# Patient Record
Sex: FEMALE | Race: WHITE | Employment: OTHER | URBAN - METROPOLITAN AREA
[De-identification: names, ages, dates, MRNs, and addresses within clinical notes are randomized per-mention and may not be internally consistent; named-entity substitution may affect disease eponyms.]

---

## 2017-07-03 ENCOUNTER — TELEPHONE (OUTPATIENT)
Dept: FAMILY MEDICINE CLINIC | Age: 37
End: 2017-07-03

## 2020-01-13 ENCOUNTER — OFFICE VISIT (OUTPATIENT)
Dept: FAMILY MEDICINE CLINIC | Age: 40
End: 2020-01-13
Payer: COMMERCIAL

## 2020-01-13 VITALS
DIASTOLIC BLOOD PRESSURE: 77 MMHG | OXYGEN SATURATION: 98 % | HEIGHT: 62 IN | BODY MASS INDEX: 29.22 KG/M2 | RESPIRATION RATE: 16 BRPM | SYSTOLIC BLOOD PRESSURE: 118 MMHG | WEIGHT: 158.8 LBS | HEART RATE: 80 BPM

## 2020-01-13 LAB — HBA1C MFR BLD: 5.8 %

## 2020-01-13 PROCEDURE — 4004F PT TOBACCO SCREEN RCVD TLK: CPT | Performed by: FAMILY MEDICINE

## 2020-01-13 PROCEDURE — G8427 DOCREV CUR MEDS BY ELIG CLIN: HCPCS | Performed by: FAMILY MEDICINE

## 2020-01-13 PROCEDURE — G8419 CALC BMI OUT NRM PARAM NOF/U: HCPCS | Performed by: FAMILY MEDICINE

## 2020-01-13 PROCEDURE — 83036 HEMOGLOBIN GLYCOSYLATED A1C: CPT | Performed by: FAMILY MEDICINE

## 2020-01-13 PROCEDURE — 99204 OFFICE O/P NEW MOD 45 MIN: CPT | Performed by: FAMILY MEDICINE

## 2020-01-13 PROCEDURE — G8484 FLU IMMUNIZE NO ADMIN: HCPCS | Performed by: FAMILY MEDICINE

## 2020-01-13 RX ORDER — PANTOPRAZOLE SODIUM 40 MG/1
40 TABLET, DELAYED RELEASE ORAL
Qty: 30 TABLET | Refills: 5 | Status: SHIPPED | OUTPATIENT
Start: 2020-01-13 | End: 2020-05-11

## 2020-01-13 ASSESSMENT — PATIENT HEALTH QUESTIONNAIRE - PHQ9
SUM OF ALL RESPONSES TO PHQ QUESTIONS 1-9: 0
SUM OF ALL RESPONSES TO PHQ QUESTIONS 1-9: 0
SUM OF ALL RESPONSES TO PHQ9 QUESTIONS 1 & 2: 0
1. LITTLE INTEREST OR PLEASURE IN DOING THINGS: 0
2. FEELING DOWN, DEPRESSED OR HOPELESS: 0

## 2020-01-13 NOTE — PROGRESS NOTES
 COLONOSCOPY  7-14-15    incomplete/poor prep, hemorrhoids.     ENTEROCELE REPAIR  13    ESOPHAGOSCOPY  1/22/15    HYSTERECTOMY  13    HETAL, Right Salpingectomy, Enterocele, SHON    LAPAROSCOPY  2013    Diagnostic converted to Operative- [facility]       due to cyst- right    TUBAL LIGATION      UPPER GASTROINTESTINAL ENDOSCOPY      WISDOM TOOTH EXTRACTION      x4     Family History   Problem Relation Age of Onset    Kidney Disease Mother     Diabetes Mother     Heart Disease Mother     Lung Cancer Maternal Grandmother     Diabetes Maternal Grandmother     Lung Cancer Maternal Grandfather     Diabetes Maternal Grandfather     Breast Cancer Neg Hx     Cancer Neg Hx     Colon Cancer Neg Hx     Eclampsia Neg Hx     Hypertension Neg Hx     Ovarian Cancer Neg Hx      Labor Neg Hx     Spont Abortions Neg Hx     Stroke Neg Hx        Current Outpatient Medications   Medication Sig Dispense Refill    pantoprazole (PROTONIX) 40 MG tablet Take 1 tablet by mouth daily (with breakfast) 30 tablet 5    dexlansoprazole (DEXILANT) 60 MG CPDR delayed release capsule Take 1 capsule by mouth daily 30 capsule 1    ibuprofen (ADVIL;MOTRIN) 800 MG tablet Take 1 tablet by mouth every 6 hours as needed for Pain (Patient not taking: Reported on 2020) 30 tablet 1    acetaminophen (AMINOFEN) 325 MG tablet Take 2 tablets by mouth every 4 hours as needed for Pain (Patient not taking: Reported on 2020) 30 tablet 1    Melatonin 5 MG SUBL       FETZIMA 40 MG CP24 ER capsule       FANAPT 2 MG TABS tablet       VENTOLIN  (90 BASE) MCG/ACT inhaler INHALE 2 PUFFS EVERY 4 HOURS AS NEEDED FOR WHEEZING (Patient not taking: Reported on 2020) 18 g 0    divalproex (DEPAKOTE ER) 250 MG ER tablet       gabapentin (NEURONTIN) 600 MG tablet       metFORMIN (GLUCOPHAGE) 1000 MG tablet TAKE 1 TABLET TWICE A DAY (Patient not taking: Reported on 2020) 30 tablet 0    clonazePAM (KLONOPIN) 1 MG tablet Take 1 mg by mouth 2 times daily as needed      Respiratory Therapy Supplies (NEBULIZER/TUBING/MOUTHPIECE) KIT 1 kit by Does not apply route daily as needed (Patient not taking: Reported on 1/13/2020) 1 kit 0    cetirizine (ZYRTEC) 10 MG tablet       DEXILANT 60 MG CPDR capsule       vitamin D (ERGOCALCIFEROL) 69318 UNITS CAPS capsule TAKE 1 CAPSULE DAILY ONCE A WEEK (Patient not taking: Reported on 1/13/2020) 12 capsule 3    dicyclomine (BENTYL) 10 MG capsule TAKE 1 CAPSULE FOUR TIMES A DAY AS NEEDED (Patient not taking: Reported on 1/13/2020) 60 capsule 5    simvastatin (ZOCOR) 20 MG tablet Take 1 tablet by mouth nightly (Patient not taking: Reported on 1/13/2020) 30 tablet 11    fenofibrate 160 MG tablet Take 1 tablet by mouth daily (Patient not taking: Reported on 1/13/2020) 30 tablet 11    budesonide-formoterol (SYMBICORT) 80-4.5 MCG/ACT AERO Inhale 2 puffs into the lungs 2 times daily Rinse mouth after use. (Patient not taking: Reported on 1/13/2020) 1 Inhaler 5    hydrocortisone (ANUSOL-HC) 25 MG suppository Place 1 suppository rectally 2 times daily as needed for Hemorrhoids (Patient not taking: Reported on 1/13/2020) 60 suppository 3    levothyroxine (SYNTHROID) 50 MCG tablet TAKE 1 TABLET DAILY (Patient not taking: Reported on 1/13/2020) 30 tablet 0    Blood Glucose Monitoring Suppl (FREESTYLE LITE) LC       albuterol (PROVENTIL) (2.5 MG/3ML) 0.083% nebulizer solution Take 3 mLs by nebulization every 6 hours as needed for Wheezing. (Patient not taking: Reported on 1/13/2020) 120 each 3    naproxen (NAPROSYN) 500 MG tablet Take 1 tablet by mouth 2 times daily.  (Patient not taking: Reported on 1/13/2020) 60 tablet 0    BD SHARPS [address] USE AS DIRECTED (Patient not taking: Reported on 1/13/2020) 1 each 0    FREESTYLE TEST STRIPS strip TEST TWO TIMES A DAY AS DIRECTED (Patient not taking: Reported on 1/13/2020) 100 strip 3    BD PEN NEEDLE ROSANNE U/F 32G X 4 MM MISC       busPIRone (BUSPAR) 15 MG tablet Take 15 mg by mouth 3 times daily. No current facility-administered medications for this visit. ALLERGIES:    Allergies   Allergen Reactions    Codeine     Chantix [Varenicline Tartrate] Rash    Magnesium Citrate Nausea And Vomiting    Vicodin [Hydrocodone-Acetaminophen] Nausea And Vomiting       Social History     Tobacco Use    Smoking status: Current Every Day Smoker     Packs/day: 2.00     Years: 15.00     Pack years: 30.00     Types: Cigarettes    Smokeless tobacco: Never Used   Substance Use Topics    Alcohol use: No        LDL Cholesterol (mg/dL)   Date Value   05/19/2016 52     HDL (mg/dL)   Date Value   05/19/2016 54     BUN (mg/dL)   Date Value   05/19/2016 10     CREATININE (mg/dL)   Date Value   05/19/2016 1.12 (H)     Glucose (mg/dL)   Date Value   05/19/2016 116 (H)   04/04/2012 147 (H)     Hemoglobin A1C (%)   Date Value   01/13/2020 5.8     Microalb/Crt. Ratio (mcg/mg creat)   Date Value   05/15/2015 7              Subjective:      HPI  She is here today to reestablish care  She has not been seen by me in about 5 years she had moved out of town  She previously was diabetic is lost a considerable amount of weight her hemoglobin A1c today off meds is 5.8  She continues to have ongoing gastrointestinal abnormalities she was seen by Dr. Gerhardt Bias in the past and would like to return to see him she continues to have gastroesophageal reflux issues. She previously also was on Dexilant wanted to go back on that  She has been taking over-the-counter Zantac      Review of Systems:     Constitutional: Negative for fever, appetite change and fatigue. Family social and medical history reviewed and unchanged     HENT: Negative. Negative for nosebleeds, trouble swallowing and neck pain. Eyes: Negative for photophobia and visual disturbance. Respiratory: Negative. Negative for chest tightness and shortness of breath. Cardiovascular: Negative. Negative for chest pain and leg swelling. Gastrointestinal: Negative. Negative for abdominal pain and blood in stool. Endocrine: Negative for cold intolerance and polyuria. Genitourinary: Negative for dysuria and hematuria. Musculoskeletal: Negative. Skin: Negative for rash. Allergic/Immunologic: Negative. Neurological: Negative. Negative for dizziness, weakness and numbness. Hematological: Negative. Negative for adenopathy. Does not bruise/bleed easily. Psychiatric/Behavioral: Negative for sleep disturbance, dysphoric mood and  decreased concentration. The patient is not nervous/anxious. Objective:     Physical Exam:     Nursing note and vitals reviewed. /77   Pulse 80   Resp 16   Ht 5' 2.05\" (1.576 m)   Wt 158 lb 12.8 oz (72 kg)   LMP 05/30/2013   SpO2 98%   BMI 29.00 kg/m²   Constitutional: She is oriented to person, place, and time. She   appears well-developed and well-nourished. HENT:   Head: Normocephalic and atraumatic. Right Ear: External ear normal. Tympanic membrane is not erythematous. No middle ear effusion. Left Ear: External ear normal. Tympanic membrane is not erythematous. No middle ear effusion. Nose: No mucosal edema. Mouth/Throat: Oropharynx is clear and moist. No posterior oropharyngeal erythema. Eyes: Conjunctivae and EOM are normal. Pupils are equal, round, and reactive to light. Neck: Normal range of motion. Neck supple. No thyromegaly present. Cardiovascular: Normal rate, regular rhythm and normal heart sounds. No murmur heard. Pulmonary/Chest: Effort normal and breath sounds normal. She has no wheezes. Shehas no rales. Abdominal: Soft. Bowel sounds are normal. She exhibits no distension and no mass. There is no tenderness. There is no rebound and no guarding. Genitourinary/Anorectal:deferred  Musculoskeletal: Normal range of motion. She exhibits no edema or tenderness.    Lymphadenopathy: She has no cervical adenopathy. Neurological: She is alert and oriented to person, place, and time. She has normal reflexes. Skin: Skin is warm and dry. No rash noted. Psychiatric: She has a normal mood and affect. Her   behavior is normal.       Assessment:      1. Gastroesophageal reflux disease with esophagitis    2. History of diabetes mellitus    3. Well adult exam          Plan:      Call or return to clinic prn if these symptoms worsen or fail to improve as anticipated. I have reviewed the instructions with the patient, answering all questions to her satisfaction. No follow-ups on file.   Orders Placed This Encounter   Procedures    CBC Auto Differential     Standing Status:   Future     Standing Expiration Date:   7/13/2020    Comprehensive Metabolic Panel     Standing Status:   Future     Standing Expiration Date:   7/13/2020    Lipid Panel     Standing Status:   Future     Standing Expiration Date:   7/13/2020     Order Specific Question:   Is Patient Fasting?/# of Hours     Answer:   yes    T4, Free     Standing Status:   Future     Standing Expiration Date:   7/13/2020    Thyroid Peroxidase Antibody     Standing Status:   Future     Standing Expiration Date:   7/13/2020    TSH without Reflex     Standing Status:   Future     Standing Expiration Date:   7/13/2020    Vitamin D 25 Hydroxy     Standing Status:   Future     Standing Expiration Date:   1/13/2021   Venus Duron MD, Gastroenterology, [place]     Referral Priority:   Routine     Referral Type:   Eval and Treat     Referral Reason:   Specialty Services Required     Referred to Provider:   Michelle Stewart MD     Requested Specialty:   Gastroenterology     Number of Visits Requested:   1    POCT glycosylated hemoglobin (Hb A1C)     Orders Placed This Encounter   Medications    pantoprazole (PROTONIX) 40 MG tablet     Sig: Take 1 tablet by mouth daily (with breakfast)     Dispense:  30 tablet     Refill:  5     Will refer

## 2020-01-15 ENCOUNTER — HOSPITAL ENCOUNTER (EMERGENCY)
Age: 40
Discharge: HOME | End: 2020-01-15
Attending: EMERGENCY MEDICINE
Payer: COMMERCIAL

## 2020-01-15 ENCOUNTER — APPOINTMENT (OUTPATIENT)
Dept: CT IMAGING | Age: 40
End: 2020-01-15
Payer: COMMERCIAL

## 2020-01-15 ENCOUNTER — HOSPITAL ENCOUNTER (OUTPATIENT)
Age: 40
Discharge: HOME | End: 2020-01-15
Payer: COMMERCIAL

## 2020-01-15 VITALS
HEIGHT: 62 IN | BODY MASS INDEX: 29.08 KG/M2 | DIASTOLIC BLOOD PRESSURE: 89 MMHG | SYSTOLIC BLOOD PRESSURE: 129 MMHG | TEMPERATURE: 97.3 F | WEIGHT: 158 LBS | HEART RATE: 86 BPM | OXYGEN SATURATION: 97 % | RESPIRATION RATE: 14 BRPM

## 2020-01-15 LAB
ABSOLUTE EOS #: 0.11 K/UL (ref 0–0.44)
ABSOLUTE IMMATURE GRANULOCYTE: 0.03 K/UL (ref 0–0.3)
ABSOLUTE LYMPH #: 1.61 K/UL (ref 1.1–3.7)
ABSOLUTE MONO #: 0.35 K/UL (ref 0.1–1.2)
ALBUMIN SERPL-MCNC: 3.9 G/DL (ref 3.5–5.2)
ALBUMIN/GLOBULIN RATIO: 1.3 (ref 1–2.5)
ALP BLD-CCNC: 84 U/L (ref 35–104)
ALT SERPL-CCNC: 22 U/L (ref 5–33)
ANION GAP SERPL CALCULATED.3IONS-SCNC: 13 MMOL/L (ref 9–17)
AST SERPL-CCNC: 16 U/L
BASOPHILS # BLD: 0 % (ref 0–2)
BASOPHILS ABSOLUTE: 0.03 K/UL (ref 0–0.2)
BILIRUB SERPL-MCNC: 0.2 MG/DL (ref 0.3–1.2)
BUN BLDV-MCNC: 14 MG/DL (ref 6–20)
BUN/CREAT BLD: ABNORMAL (ref 9–20)
CALCIUM SERPL-MCNC: 9.3 MG/DL (ref 8.6–10.4)
CHLORIDE BLD-SCNC: 103 MMOL/L (ref 98–107)
CHOLESTEROL/HDL RATIO: 3.1
CHOLESTEROL: 172 MG/DL
CO2: 23 MMOL/L (ref 20–31)
CREAT SERPL-MCNC: 0.77 MG/DL (ref 0.5–0.9)
DIFFERENTIAL TYPE: ABNORMAL
EOSINOPHILS RELATIVE PERCENT: 1 % (ref 1–4)
GFR AFRICAN AMERICAN: >60 ML/MIN
GFR NON-AFRICAN AMERICAN: >60 ML/MIN
GFR SERPL CREATININE-BSD FRML MDRD: ABNORMAL ML/MIN/{1.73_M2}
GFR SERPL CREATININE-BSD FRML MDRD: ABNORMAL ML/MIN/{1.73_M2}
GLUCOSE BLD-MCNC: 118 MG/DL (ref 70–99)
HCT VFR BLD CALC: 45.1 % (ref 36.3–47.1)
HDLC SERPL-MCNC: 56 MG/DL
HEMOGLOBIN: 14.8 G/DL (ref 11.9–15.1)
IMMATURE GRANULOCYTES: 0 %
LDL CHOLESTEROL: 98 MG/DL (ref 0–130)
LIPASE: 37 U/L (ref 13–60)
LYMPHOCYTES # BLD: 19 % (ref 24–43)
MCH RBC QN AUTO: 29.9 PG (ref 25.2–33.5)
MCHC RBC AUTO-ENTMCNC: 32.8 G/DL (ref 28.4–34.8)
MCV RBC AUTO: 91.1 FL (ref 82.6–102.9)
MONOCYTES # BLD: 4 % (ref 3–12)
NRBC AUTOMATED: 0 PER 100 WBC
PDW BLD-RTO: 13.2 % (ref 11.8–14.4)
PLATELET # BLD: 225 K/UL (ref 138–453)
PLATELET ESTIMATE: ABNORMAL
PMV BLD AUTO: 10.5 FL (ref 8.1–13.5)
POTASSIUM SERPL-SCNC: 4.8 MMOL/L (ref 3.7–5.3)
RBC # BLD: 4.95 M/UL (ref 3.95–5.11)
RBC # BLD: ABNORMAL 10*6/UL
SEG NEUTROPHILS: 76 % (ref 36–65)
SEGMENTED NEUTROPHILS ABSOLUTE COUNT: 6.21 K/UL (ref 1.5–8.1)
SODIUM BLD-SCNC: 139 MMOL/L (ref 135–144)
THYROID PEROXIDASE (TPO) AB: 12.9 IU/ML (ref 0–35)
THYROXINE, FREE: 1.05 NG/DL (ref 0.93–1.7)
TOTAL PROTEIN: 6.8 G/DL (ref 6.4–8.3)
TRIGL SERPL-MCNC: 90 MG/DL
TSH SERPL DL<=0.05 MIU/L-ACNC: 1.46 MIU/L (ref 0.3–5)
VITAMIN D 25-HYDROXY: 35 NG/ML (ref 30–100)
VLDLC SERPL CALC-MCNC: NORMAL MG/DL (ref 1–30)
WBC # BLD: 8.3 K/UL (ref 3.5–11.3)
WBC # BLD: ABNORMAL 10*3/UL

## 2020-01-15 PROCEDURE — 36415 COLL VENOUS BLD VENIPUNCTURE: CPT

## 2020-01-15 PROCEDURE — 84439 ASSAY OF FREE THYROXINE: CPT

## 2020-01-15 PROCEDURE — 6370000000 HC RX 637 (ALT 250 FOR IP): Performed by: EMERGENCY MEDICINE

## 2020-01-15 PROCEDURE — 6360000004 HC RX CONTRAST MEDICATION: Performed by: EMERGENCY MEDICINE

## 2020-01-15 PROCEDURE — 82306 VITAMIN D 25 HYDROXY: CPT

## 2020-01-15 PROCEDURE — 84443 ASSAY THYROID STIM HORMONE: CPT

## 2020-01-15 PROCEDURE — 80061 LIPID PANEL: CPT

## 2020-01-15 PROCEDURE — 74177 CT ABD & PELVIS W/CONTRAST: CPT

## 2020-01-15 PROCEDURE — 85025 COMPLETE CBC W/AUTO DIFF WBC: CPT

## 2020-01-15 PROCEDURE — 80053 COMPREHEN METABOLIC PANEL: CPT

## 2020-01-15 PROCEDURE — 86376 MICROSOMAL ANTIBODY EACH: CPT

## 2020-01-15 PROCEDURE — 83690 ASSAY OF LIPASE: CPT

## 2020-01-15 PROCEDURE — 99284 EMERGENCY DEPT VISIT MOD MDM: CPT

## 2020-01-15 RX ORDER — FAMOTIDINE 20 MG/1
20 TABLET, FILM COATED ORAL ONCE
Status: COMPLETED | OUTPATIENT
Start: 2020-01-15 | End: 2020-01-15

## 2020-01-15 RX ORDER — ACETAMINOPHEN 500 MG
1000 TABLET ORAL EVERY 8 HOURS PRN
Qty: 14 TABLET | Refills: 0 | Status: SHIPPED | OUTPATIENT
Start: 2020-01-15 | End: 2020-05-11

## 2020-01-15 RX ORDER — MAGNESIUM HYDROXIDE/ALUMINUM HYDROXICE/SIMETHICONE 120; 1200; 1200 MG/30ML; MG/30ML; MG/30ML
30 SUSPENSION ORAL ONCE
Status: COMPLETED | OUTPATIENT
Start: 2020-01-15 | End: 2020-01-15

## 2020-01-15 RX ORDER — DICYCLOMINE HYDROCHLORIDE 10 MG/1
10 CAPSULE ORAL ONCE
Status: COMPLETED | OUTPATIENT
Start: 2020-01-15 | End: 2020-01-15

## 2020-01-15 RX ADMIN — IOHEXOL 75 ML: 350 INJECTION, SOLUTION INTRAVENOUS at 22:12

## 2020-01-15 RX ADMIN — ALUMINUM HYDROXIDE, MAGNESIUM HYDROXIDE, AND SIMETHICONE 30 ML: 200; 200; 20 SUSPENSION ORAL at 21:35

## 2020-01-15 RX ADMIN — DICYCLOMINE HYDROCHLORIDE 10 MG: 10 CAPSULE ORAL at 21:35

## 2020-01-15 RX ADMIN — FAMOTIDINE 20 MG: 20 TABLET, FILM COATED ORAL at 21:35

## 2020-01-15 ASSESSMENT — ENCOUNTER SYMPTOMS
SORE THROAT: 0
ABDOMINAL PAIN: 1
DIARRHEA: 1
VOMITING: 0
NAUSEA: 0
SHORTNESS OF BREATH: 0

## 2020-01-15 ASSESSMENT — PAIN DESCRIPTION - FREQUENCY: FREQUENCY: CONTINUOUS

## 2020-01-15 ASSESSMENT — PAIN SCALES - GENERAL: PAINLEVEL_OUTOF10: 8

## 2020-01-15 ASSESSMENT — PAIN DESCRIPTION - LOCATION: LOCATION: ABDOMEN

## 2020-01-16 NOTE — ED TRIAGE NOTES
Pt arrived tot the ED with c/o abdominal pain that has been going on for quite some time. Pt states the pain comes and goes and pt states she had a bowel movement this am but now she feels as if she cant go. Pt is alert and oriented x4.

## 2020-01-16 NOTE — ED PROVIDER NOTES
complication, not stated as uncontrolled, Vocal cord polyps, and Vomiting. has a past surgical history that includes Cholecystectomy; Tubal ligation; [place] tooth extraction; Ovary removal; laparoscopy (1/23/2013); Hysterectomy (9/23/13); Enterocele repair (9/23/13); Abdominal adhesion surgery (9/23/13); Upper gastrointestinal endoscopy; Esophagoscopy (1/22/15); Colonoscopy (2/4/14); and Colonoscopy (7-14-15).      Social History     Socioeconomic History    Marital status:      Spouse name: Not on file    Number of children: Not on file    Years of education: Not on file    Highest education level: Not on file   Occupational History    Occupation: unemployeed   Social Needs    Financial resource strain: Not on file    Food insecurity:     Worry: Not on file     Inability: Not on file    Transportation needs:     Medical: Not on file     Non-medical: Not on file   Tobacco Use    Smoking status: Current Every Day Smoker     Packs/day: 1.50     Years: 15.00     Pack years: 22.50     Types: Cigarettes    Smokeless tobacco: Never Used   Substance and Sexual Activity    Alcohol use: No    Drug use: Yes     Types: Marijuana     Comment: everyday    Sexual activity: Yes     Partners: Male     Birth control/protection: Surgical     Comment: HETAL Right Salpingectomy   Lifestyle    Physical activity:     Days per week: Not on file     Minutes per session: Not on file    Stress: Not on file   Relationships    Social connections:     Talks on phone: Not on file     Gets together: Not on file     Attends Gnosticism service: Not on file     Active member of club or organization: Not on file     Attends meetings of clubs or organizations: Not on file     Relationship status: Not on file    Intimate partner violence:     Fear of current or ex partner: Not on file     Emotionally abused: Not on file     Physically abused: Not on file     Forced sexual activity: Not on file   Other Topics Concern    Not on file   Social History Narrative    Not on file       Family History   Problem Relation Age of Onset    Kidney Disease Mother     Diabetes Mother     Heart Disease Mother     Lung Cancer Maternal Grandmother     Diabetes Maternal Grandmother     Lung Cancer Maternal Grandfather     Diabetes Maternal Grandfather     Breast Cancer Neg Hx     Cancer Neg Hx     Colon Cancer Neg Hx     Eclampsia Neg Hx     Hypertension Neg Hx     Ovarian Cancer Neg Hx      Labor Neg Hx     Spont Abortions Neg Hx     Stroke Neg Hx        Allergies:  Codeine; Chantix [varenicline tartrate]; Magnesium citrate; and Vicodin [hydrocodone-acetaminophen]    Home Medications:  Prior to Admission medications    Medication Sig Start Date End Date Taking?  Authorizing Provider   acetaminophen (TYLENOL) 500 MG tablet Take 2 tablets by mouth every 8 hours as needed for Pain 1/15/20  Yes Matty Braga, DO   pantoprazole (PROTONIX) 40 MG tablet Take 1 tablet by mouth daily (with breakfast) 20   Grace Marquez DO   dexlansoprazole (DEXILANT) 60 MG CPDR delayed release capsule Take 1 capsule by mouth daily 11/3/16 12/3/16  Luly Greenwood MD   ibuprofen (ADVIL;MOTRIN) 800 MG tablet Take 1 tablet by mouth every 6 hours as needed for Pain  Patient not taking: Reported on 2020 10/3/16   PHILIP Kelly CNM   Melatonin 5 MG SUBL  16   Historical Provider, MD   FETZIMA 40 MG CP24 ER capsule  16   Historical Provider, MD   FANAPT 2 MG TABS tablet  16   Historical Provider, MD SOLISOLIN  (90 BASE) MCG/ACT inhaler INHALE 2 PUFFS EVERY 4 HOURS AS NEEDED FOR WHEEZING  Patient not taking: Reported on 2020   Willy Marquez DO   divalproex (DEPAKOTE ER) 250 MG ER tablet  16   Historical Provider, MD   gabapentin (NEURONTIN) 600 MG tablet  16   Historical Provider, MD   metFORMIN (GLUCOPHAGE) 1000 MG tablet TAKE 1 TABLET TWICE A DAY  Patient not taking: Reported on 2020 Flako Mullen MD   BD SHARPS [address] USE AS DIRECTED  Patient not taking: Reported on 1/13/2020 6/2/14   Grace Marquez DO   FREESTYLE TEST STRIPS strip TEST TWO TIMES A DAY AS DIRECTED  Patient not taking: Reported on 1/13/2020 2/17/14   Grace Marquez DO   BD PEN NEEDLE ROSANNE U/F 32G X 4 MM MISC  7/8/13   Historical Provider, MD   busPIRone (BUSPAR) 15 MG tablet Take 15 mg by mouth 3 times daily. Historical Provider, MD       REVIEW OF SYSTEMS    (2-9 systems for level 4, 10 or more for level 5)      Review of Systems   Constitutional: Negative for chills and fever. HENT: Negative for sore throat. Respiratory: Negative for shortness of breath. Cardiovascular: Negative for chest pain. Gastrointestinal: Positive for abdominal pain and diarrhea. Negative for nausea and vomiting. Genitourinary: Negative for dysuria. Skin: Negative for wound. Neurological: Negative for syncope. PHYSICAL EXAM   (up to 7 for level 4, 8 or more for level 5)      INITIAL VITALS:   /89   Pulse 86   Temp 97.3 °F (36.3 °C) (Oral)   Resp 14   Ht 5' 2\" (1.575 m)   Wt 158 lb (71.7 kg)   LMP 05/30/2013   SpO2 97%   BMI 28.90 kg/m²     Physical Exam  Vitals signs and nursing note reviewed. Constitutional:       General: She is not in acute distress. Appearance: Normal appearance. She is well-developed. She is not ill-appearing, toxic-appearing or diaphoretic. HENT:      Head: Normocephalic and atraumatic. Eyes:      General:         Right eye: No discharge. Left eye: No discharge. Neck:      Trachea: No tracheal deviation. Cardiovascular:      Rate and Rhythm: Normal rate and regular rhythm. Heart sounds: Normal heart sounds. Pulmonary:      Effort: Pulmonary effort is normal. No respiratory distress. Breath sounds: Normal breath sounds. No stridor. No wheezing, rhonchi or rales. Abdominal:      General: Abdomen is flat.  Bowel sounds are normal. There is no TECHNOLOGIST PROVIDED HISTORY: abdominal pain x4 weeks Reason for Exam: abd pain x 4 weeks Acuity: Unknown Type of Exam: Unknown FINDINGS: Lower Chest: Lung bases are without consolidation or effusion. Visualized cardiac structures are unremarkable. Organs: Liver and spleen are normal size and overall attenuation. The gallbladder has been removed. The pancreas and adrenal glands are unremarkable. The kidneys are without obstructive uropathy. The ureters are not dilated. The urinary bladder is unremarkable. GI/Bowel: The stomach is distended with ingested material.  Loops of small bowel are normal in caliber without evidence for obstruction. The colon contains air and fecal residue and is otherwise unremarkable. Appendix is normal.  There is no intraperitoneal free air or free fluid. Pelvis: The uterus has been surgically removed. Peritoneum/Retroperitoneum: The psoas muscles are symmetric. The abdominal aorta is normal in caliber. The inferior vena cava is unremarkable. There is no retroperitoneal or mesenteric adenopathy. Bones/Soft Tissues: The extra-abdominal soft tissues are unremarkable. There is no acute osseous abnormality. No acute abdominal or pelvic abnormality. EKG  None    All EKG's are interpreted by the Emergency Department Physician who either signs or Co-signs this chart in the absence of a cardiologist.    DIFFERENTIAL DIAGNOSIS:  SBO, appendicitis, diverticulitis, hollow viscous perforation, pancreatitis    EMERGENCY DEPARTMENT COURSE & MDM:  37 y.o. female presents with a chief complaint of generalized abdominal pain. Vitals are stable. Patient is very well-appearing and in no acute distress. Patient's labs were reviewed from earlier today and are largely unremarkable. Will order lipase to assess for possible smoldering pancreatitis. Patient's abdomen is soft and not rigid therefore my clinical concern for viscus perforation is low.   Patient is [place] had her gallbladder [id number]  [phone]    Schedule an appointment as soon as possible for a visit         DISCHARGE MEDICATIONS:  Discharge Medication List as of 1/15/2020 10:35 PM          Lorenzo Quintanilla DO  Emergency Medicine Resident    (Please note that portions ofthis note were completed with a voice recognition program.  Efforts were made to edit the dictations but occasionally words are mis-transcribed.)        Lorenzo Quintanilla DO  Resident  01/15/20 [id number]

## 2020-01-16 NOTE — ED PROVIDER NOTES
[facility]     Emergency Department     Faculty Attestation    I performed a history and physical examination of the patient and discussed management with the resident. I reviewed the residents note and agree with the documented findings and plan of care. Any areas of disagreement are noted on the chart. I was personally present for the key portions of any procedures. I have documented in the chart those procedures where I was not present during the key portions. I have reviewed the emergency nurses triage note. I agree with the chief complaint, past medical history, past surgical history, allergies, medications, social and family history as documented unless otherwise noted below. For Physician Assistant/ Nurse Practitioner cases/documentation I have personally evaluated this patient and have completed at least one if not all key elements of the E/M (history, physical exam, and MDM). Additional findings are as noted. Primary Care Physician:  Saba Ravi DO    CHIEF COMPLAINT       Chief Complaint   Patient presents with    Abdominal Pain     x4 weeks intermittently    Nausea    Diarrhea     x1 at 5am, states \"I feel like I need to go, but I can't\" since then       RECENT VITALS:   Temp: 97.3 °F (36.3 °C),  Pulse: 86, Resp: 14, BP: 129/89    LABS:  Labs Reviewed   LIPASE       Radiology  CT ABDOMEN PELVIS W IV CONTRAST Additional Contrast? None    (Results Pending)         Attending Physician Additional  Notes    The patient is a 40-year-old female with history of GERD, cholecystectomy, partial hysterectomy with single ovary, endometriosis, hypothyroidism, anxiety, depression, IBS, and history of chronic abdominal pain who presents for evaluation of generalized abdominal pain. The patient reports that starting approximately 1 month ago she developed gradual onset, constant, progressive, generalized, nonradiating abdominal pain. She has been taking Tylenol without relief. She was seen by her PCP yesterday for her symptoms and had labs ordered. She states that she completed the labs today and they were unremarkable. The patient continues to have pain and came to the emergency department for further evaluation. The patient denies any trauma to her abdomen. She does not list any provoking or palliating factors. She states that she has seen GI, Dr. Kendra Heredia, in the past at [facility] for her IBS and GERD. She has not had a recent scope or CT scan. The patient denies fever, chills, headache, vision changes, neck pain, back pain, chest pain, shortness of breath, nausea, vomiting, urinary/bowel symptoms, focal weakness, numbness, tingling, recent injury or illness. Vital signs stable. Heart regular rate and rhythm. Lungs clear to auscultation. Abdomen soft with epigastric and periumbilical tenderness to palpation. No pain or McBurney's point, negative Hines sign. No lower abdominal tenderness. She denies any vaginal bleeding or discharge. CBC, CMP, lipid panel, lipase, free T4, thyroid labs, and vitamin D level from earlier today are unremarkable. Plan to obtain CT abdomen pelvis. States that she has used Bentyl in the past with some improvement. Bentyl and GI cocktail ordered.             Brynn Moore DO  Attending Emergency Physician           Brynn Moore DO  01/15/20 [id number]

## 2020-02-05 ENCOUNTER — TELEPHONE (OUTPATIENT)
Dept: GASTROENTEROLOGY | Age: 40
End: 2020-02-05

## 2020-02-05 NOTE — TELEPHONE ENCOUNTER
referral in workqueue. Pt. been dismissed from [address] [place] Writer sending to Trish Shi to get direction.

## 2020-03-02 ENCOUNTER — TELEPHONE (OUTPATIENT)
Dept: GASTROENTEROLOGY | Age: 40
End: 2020-03-02

## 2020-04-29 ENCOUNTER — TELEPHONE (OUTPATIENT)
Dept: GASTROENTEROLOGY | Age: 40
End: 2020-04-29

## 2020-04-29 NOTE — TELEPHONE ENCOUNTER
LVM for patient that we are not seeing patients in the office on 5/5/20. All appointments are being switched to vv. For patient to call office if this does not work. Disclaimer not read due to patient not being available.

## 2020-05-11 ENCOUNTER — APPOINTMENT (OUTPATIENT)
Dept: GENERAL RADIOLOGY | Age: 40
End: 2020-05-11
Payer: COMMERCIAL

## 2020-05-11 ENCOUNTER — NURSE TRIAGE (OUTPATIENT)
Dept: OTHER | Facility: CLINIC | Age: 40
End: 2020-05-11

## 2020-05-11 ENCOUNTER — HOSPITAL ENCOUNTER (EMERGENCY)
Age: 40
Discharge: HOME | End: 2020-05-11
Attending: EMERGENCY MEDICINE
Payer: COMMERCIAL

## 2020-05-11 VITALS
BODY MASS INDEX: 29.44 KG/M2 | SYSTOLIC BLOOD PRESSURE: 107 MMHG | OXYGEN SATURATION: 98 % | TEMPERATURE: 97.7 F | HEIGHT: 62 IN | DIASTOLIC BLOOD PRESSURE: 69 MMHG | HEART RATE: 81 BPM | WEIGHT: 160 LBS | RESPIRATION RATE: 16 BRPM

## 2020-05-11 PROCEDURE — 96372 THER/PROPH/DIAG INJ SC/IM: CPT

## 2020-05-11 PROCEDURE — 6360000002 HC RX W HCPCS: Performed by: STUDENT IN AN ORGANIZED HEALTH CARE EDUCATION/TRAINING PROGRAM

## 2020-05-11 PROCEDURE — 99284 EMERGENCY DEPT VISIT MOD MDM: CPT

## 2020-05-11 PROCEDURE — 73110 X-RAY EXAM OF WRIST: CPT

## 2020-05-11 RX ORDER — IBUPROFEN 600 MG/1
600 TABLET ORAL EVERY 6 HOURS PRN
Qty: 15 TABLET | Refills: 0 | Status: ON HOLD | OUTPATIENT
Start: 2020-05-11 | End: 2021-08-30 | Stop reason: ALTCHOICE

## 2020-05-11 RX ORDER — KETOROLAC TROMETHAMINE 30 MG/ML
30 INJECTION, SOLUTION INTRAMUSCULAR; INTRAVENOUS ONCE
Status: COMPLETED | OUTPATIENT
Start: 2020-05-11 | End: 2020-05-11

## 2020-05-11 RX ORDER — ACETAMINOPHEN 500 MG
1000 TABLET ORAL EVERY 6 HOURS PRN
Qty: 30 TABLET | Refills: 0 | Status: ON HOLD | OUTPATIENT
Start: 2020-05-11 | End: 2021-08-30 | Stop reason: ALTCHOICE

## 2020-05-11 RX ADMIN — KETOROLAC TROMETHAMINE 30 MG: 30 INJECTION, SOLUTION INTRAMUSCULAR at 15:14

## 2020-05-11 ASSESSMENT — PAIN DESCRIPTION - PROGRESSION: CLINICAL_PROGRESSION: GRADUALLY WORSENING

## 2020-05-11 ASSESSMENT — PAIN SCALES - GENERAL
PAINLEVEL_OUTOF10: 10
PAINLEVEL_OUTOF10: 9

## 2020-05-11 ASSESSMENT — PAIN DESCRIPTION - LOCATION: LOCATION: HAND

## 2020-05-11 ASSESSMENT — PAIN DESCRIPTION - ONSET: ONSET: PROGRESSIVE

## 2020-05-11 ASSESSMENT — PAIN DESCRIPTION - ORIENTATION: ORIENTATION: RIGHT

## 2020-05-11 ASSESSMENT — PAIN DESCRIPTION - DESCRIPTORS: DESCRIPTORS: PINS AND NEEDLES;TINGLING

## 2020-05-11 ASSESSMENT — PAIN DESCRIPTION - FREQUENCY: FREQUENCY: CONTINUOUS

## 2020-05-11 NOTE — ED PROVIDER NOTES
[facility]     Emergency Department     Faculty Attestation    I performed a history and physical examination of the patient and discussed management with the resident. I reviewed the resident´s note and agree with the documented findings and plan of care. Any areas of disagreement are noted on the chart. I was personally present for the key portions of any procedures. I have documented in the chart those procedures where I was not present during the key portions. I have reviewed the emergency nurses triage note. I agree with the chief complaint, past medical history, past surgical history, allergies, medications, social and family history as documented unless otherwise noted below. For Physician Assistant/ Nurse Practitioner cases/documentation I have personally evaluated this patient and have completed at least one if not all key elements of the E/M (history, physical exam, and MDM). Additional findings are as noted. Right-hand-dominant, positive Phalen's and Tinel's on the right, otherwise neuro intact, no ataxia or nystagmus, cerebellar testing normal, cranial nerves intact.      Veronica Gonzalez MD  05/11/20 [id number]

## 2020-05-11 NOTE — ED PROVIDER NOTES
MD   albuterol (PROVENTIL) (2.5 MG/3ML) 0.083% nebulizer solution Take 3 mLs by nebulization every 6 hours as needed for Wheezing. Patient not taking: Reported on 1/13/2020 10/30/14   Grace Marquez DO   naproxen (NAPROSYN) 500 MG tablet Take 1 tablet by mouth 2 times daily. Patient not taking: Reported on 1/13/2020 10/9/14   Gloria Banks MD   BD SHARPS [address] USE AS DIRECTED  Patient not taking: Reported on 1/13/2020 6/2/14   Grace Marquez DO   FREESTYLE TEST STRIPS strip TEST TWO TIMES A DAY AS DIRECTED  Patient not taking: Reported on 1/13/2020 2/17/14   Grace Marquez DO   BD PEN NEEDLE ROSANNE U/F 32G X 4 MM MISC  7/8/13   Historical Provider, MD   busPIRone (BUSPAR) 15 MG tablet Take 15 mg by mouth 3 times daily. Historical Provider, MD       REVIEW OF SYSTEMS    (2-9 systems for level 4, 10 or more for level 5)      Review of Systems   Constitutional: Negative for chills and fever. Eyes: Negative for discharge and redness. Respiratory: Negative for chest tightness and shortness of breath. Cardiovascular: Negative for chest pain. Gastrointestinal: Negative for abdominal pain. Genitourinary: Negative for dysuria and flank pain. Musculoskeletal: Negative for myalgias. Skin: Negative for rash. Allergic/Immunologic: Positive for environmental allergies. Neurological: Positive for weakness and numbness. Negative for facial asymmetry and headaches. Psychiatric/Behavioral: Negative for agitation and confusion. PHYSICAL EXAM   (up to 7 for level 4, 8 or more for level 5)     INITIAL VITALS:    height is 5' 2\" (1.575 m) and weight is 160 lb (72.6 kg). Her oral temperature is 97.7 °F (36.5 °C). Her blood pressure is 107/69 and her pulse is 81. Her respiration is 16 and oxygen saturation is 98%. Physical Exam  Vitals signs and nursing note reviewed. Constitutional:       Appearance: She is well-developed. HENT:      Head: Normocephalic and atraumatic.       Nose: Nose normal.      Mouth/Throat:      Mouth: Mucous membranes are moist.   Eyes:      General: No scleral icterus. Conjunctiva/sclera: Conjunctivae normal.      Pupils: Pupils are equal, round, and reactive to light. Neck:      Musculoskeletal: Neck supple. Trachea: No tracheal deviation. Cardiovascular:      Rate and Rhythm: Normal rate and regular rhythm. Heart sounds: Normal heart sounds. No murmur. No friction rub. No gallop. Pulmonary:      Effort: Pulmonary effort is normal. No respiratory distress. Breath sounds: Normal breath sounds. No wheezing or rales. Abdominal:      General: Bowel sounds are normal. There is no distension. Palpations: Abdomen is soft. There is no mass. Tenderness: There is no abdominal tenderness. There is no guarding or rebound. Musculoskeletal: Normal range of motion. Skin:     General: Skin is warm and dry. Findings: No erythema or rash. Neurological:      Mental Status: She is alert and oriented to person, place, and time.       Comments: Normal strength in bilateral hands, pulses 2/4, sensation decreased bilaterally worse on the right, cap refill less than 2 seconds, Tinel's positive, Phalen's positive   Psychiatric:         Behavior: Behavior normal.         DIFFERENTIAL  DIAGNOSIS     PLAN (LABS / IMAGING / EKG):  Orders Placed This Encounter   Procedures    XR WRIST RIGHT (MIN 3 VIEWS)    XR WRIST LEFT (MIN 3 VIEWS)    [facility] [facility] Wrist Brace, Left; Wrist Brace, Right       MEDICATIONS ORDERED:  Orders Placed This Encounter   Medications    ketorolac (TORADOL) injection 30 mg    ibuprofen (ADVIL;MOTRIN) 600 MG tablet     Sig: Take 1 tablet by mouth every 6 hours as needed for Pain     Dispense:  15 tablet     Refill:  0    acetaminophen (APAP EXTRA STRENGTH) 500 MG tablet     Sig: Take 2 tablets by mouth every 6 hours as needed for Pain     Dispense:  30 tablet     Refill:  0       DDX: Carpal tunnel versus fracture versus neuropathy versus Guyon Barré    Initial MDM/Plan: 37 y.o. female who presents with numbness and tingling to bilateral hands right worse than left. Suspicion is high for carpal tunnel patient has positive Phalen's and Tinel. Will treat with Toradol and get x-rays to rule out any bony process as patient has never been worked up for this. Will give bilateral wrist splints encouraged her to wear these especially at night and to follow-up with her family doctor for possible EMG and hand surgery consultation. DIAGNOSTIC RESULTS / EMERGENCY DEPARTMENT COURSE / MDM     LABS:  Labs Reviewed - No data to display      RADIOLOGY:  Xr Wrist Left (min 3 Views)    Result Date: 5/11/2020  EXAMINATION: 5 XRAY VIEWS OF THE LEFT WRIST 5/11/2020 3:37 pm COMPARISON: None. HISTORY: ORDERING SYSTEM PROVIDED HISTORY: Wrist pain, rule out fracture, carpal tunnel TECHNOLOGIST PROVIDED HISTORY: Wrist pain, rule out fracture, carpal tunnel FINDINGS: There is no acute osseous abnormality. The joint spaces are maintained. The surrounding soft tissues are unremarkable. No acute osseous or soft tissue abnormality. Xr Wrist Right (min 3 Views)    Result Date: 5/11/2020  EXAMINATION: 5 XRAY VIEWS OF THE RIGHT WRIST 5/11/2020 3:37 pm COMPARISON: None. HISTORY: ORDERING SYSTEM PROVIDED HISTORY: Wrist pain, rule out fracture, carpal tunnel TECHNOLOGIST PROVIDED HISTORY: Wrist pain, rule out fracture, carpal tunnel FINDINGS: No acute osseous abnormality. The joint spaces are maintained. The surrounding soft tissues are unremarkable. No acute osseous or soft tissue abnormality. EMERGENCY DEPARTMENT COURSE:  X-rays negative. Splints provided by nursing staff. Encouraged patient again to follow-up with primary care doctor for continued treatment.     · Based on the low acuity of concerning symptoms and improvement of symptoms, patient will be discharged with follow up and prescription information listed in the

## 2020-05-12 ASSESSMENT — ENCOUNTER SYMPTOMS
CHEST TIGHTNESS: 0
EYE DISCHARGE: 0
ABDOMINAL PAIN: 0
SHORTNESS OF BREATH: 0
EYE REDNESS: 0

## 2020-05-13 ENCOUNTER — TELEPHONE (OUTPATIENT)
Dept: FAMILY MEDICINE CLINIC | Age: 40
End: 2020-05-13

## 2020-05-20 ENCOUNTER — OFFICE VISIT (OUTPATIENT)
Dept: FAMILY MEDICINE CLINIC | Age: 40
End: 2020-05-20
Payer: COMMERCIAL

## 2020-05-20 VITALS
BODY MASS INDEX: 33.38 KG/M2 | DIASTOLIC BLOOD PRESSURE: 75 MMHG | HEART RATE: 63 BPM | WEIGHT: 181.4 LBS | TEMPERATURE: 97.2 F | OXYGEN SATURATION: 98 % | HEIGHT: 62 IN | SYSTOLIC BLOOD PRESSURE: 122 MMHG

## 2020-05-20 PROCEDURE — 4004F PT TOBACCO SCREEN RCVD TLK: CPT | Performed by: FAMILY MEDICINE

## 2020-05-20 PROCEDURE — 99213 OFFICE O/P EST LOW 20 MIN: CPT | Performed by: FAMILY MEDICINE

## 2020-05-20 PROCEDURE — G8417 CALC BMI ABV UP PARAM F/U: HCPCS | Performed by: FAMILY MEDICINE

## 2020-05-20 PROCEDURE — G8427 DOCREV CUR MEDS BY ELIG CLIN: HCPCS | Performed by: FAMILY MEDICINE

## 2020-05-20 RX ORDER — RISPERIDONE 2 MG/1
2 TABLET, FILM COATED ORAL 2 TIMES DAILY
Status: ON HOLD | COMMUNITY
End: 2021-09-02 | Stop reason: HOSPADM

## 2020-05-20 ASSESSMENT — PATIENT HEALTH QUESTIONNAIRE - PHQ9
SUM OF ALL RESPONSES TO PHQ QUESTIONS 1-9: 0
1. LITTLE INTEREST OR PLEASURE IN DOING THINGS: 0
2. FEELING DOWN, DEPRESSED OR HOPELESS: 0
SUM OF ALL RESPONSES TO PHQ QUESTIONS 1-9: 0
SUM OF ALL RESPONSES TO PHQ9 QUESTIONS 1 & 2: 0

## 2020-05-20 NOTE — PROGRESS NOTES
[facility]  [address] [place] [address] Dr  [facility] [id number]  Dept: [phone]      Brigid Ortiz is a 37 y.o. female who presents today for follow up on her  medical conditions as noted below.       Chief Complaint   Patient presents with    Carpal Tunnel       Patient Active Problem List:     GERD (gastroesophageal reflux disease)     Hypercholesteremia     Panic anxiety syndrome     DM (diabetes mellitus) (HCC)     IBS (irritable bowel syndrome)     Mood swings     Hypothyroidism     Constipation     Endometriosis     Agoraphobia     HETAL RS with enterocele and Ovarian Preservation 9/23/13     Left ankle pain     Diarrhea     Vomiting     Rectal bleeding     Midline low back pain without sciatica     Irritable bowel syndrome without diarrhea     Gastroesophageal reflux disease without esophagitis     Abdominal pain, generalized     Past Medical History:   Diagnosis Date    Agoraphobia     Anxiety     [facility]    Depression     Diarrhea     Drug abuse, marijuana     Dysmenorrhea     Endometriosis 1/23/2013    new dx    Fibroid     GERD (gastroesophageal reflux disease)     History of nipple discharge     Hyperlipidemia     Hypertension     no longer on meds-anxiety induced HTN    Hypothyroidism     Irritable bowel syndrome     LGSIL (low grade squamous intraepithelial dysplasia) 10/11    Menorrhagia     Midline low back pain without sciatica 1/26/2016    Mood swings     Ovarian cyst     Pelvic adhesions     Pelvic pain     Prolactin increased ([address] 2/12, 11/12    Rectal bleeding     Type II or unspecified type diabetes mellitus without mention of complication, not stated as uncontrolled     Vocal cord polyps     Vomiting       Past Surgical History:   Procedure Laterality Date    ABDOMINAL ADHESION SURGERY  9/23/13    CHOLECYSTECTOMY      COLONOSCOPY  2/4/14    Benign biopsies    COLONOSCOPY  7-14-15 taking: Reported on 1/13/2020) 30 tablet 0    Respiratory Therapy Supplies (NEBULIZER/TUBING/MOUTHPIECE) KIT 1 kit by Does not apply route daily as needed (Patient not taking: Reported on 1/13/2020) 1 kit 0    dicyclomine (BENTYL) 10 MG capsule TAKE 1 CAPSULE FOUR TIMES A DAY AS NEEDED (Patient not taking: Reported on 1/13/2020) 60 capsule 5    budesonide-formoterol (SYMBICORT) 80-4.5 MCG/ACT AERO Inhale 2 puffs into the lungs 2 times daily Rinse mouth after use. (Patient not taking: Reported on 1/13/2020) 1 Inhaler 5    hydrocortisone (ANUSOL-HC) 25 MG suppository Place 1 suppository rectally 2 times daily as needed for Hemorrhoids (Patient not taking: Reported on 1/13/2020) 60 suppository 3    Blood Glucose Monitoring Suppl (FREESTYLE LITE) LC       albuterol (PROVENTIL) (2.5 MG/3ML) 0.083% nebulizer solution Take 3 mLs by nebulization every 6 hours as needed for Wheezing. (Patient not taking: Reported on 1/13/2020) 120 each 3    naproxen (NAPROSYN) 500 MG tablet Take 1 tablet by mouth 2 times daily. (Patient not taking: Reported on 1/13/2020) 60 tablet 0    BD SHARPS [address] USE AS DIRECTED (Patient not taking: Reported on 1/13/2020) 1 each 0    FREESTYLE TEST STRIPS strip TEST TWO TIMES A DAY AS DIRECTED (Patient not taking: Reported on 1/13/2020) 100 strip 3    BD PEN NEEDLE ROSANNE U/F 32G X 4 MM MISC        No current facility-administered medications for this visit.       ALLERGIES:    Allergies   Allergen Reactions    Codeine     Chantix [Varenicline Tartrate] Rash    Magnesium Citrate Nausea And Vomiting    Vicodin [Hydrocodone-Acetaminophen] Nausea And Vomiting       Social History     Tobacco Use    Smoking status: Current Every Day Smoker     Packs/day: 1.50     Years: 15.00     Pack years: 22.50     Types: Cigarettes    Smokeless tobacco: Never Used   Substance Use Topics    Alcohol use: No        LDL Cholesterol (mg/dL)   Date Value   01/15/2020 98     HDL (mg/dL)   Date Value   01/15/2020 56     BUN (mg/dL)   Date Value   01/15/2020 14     CREATININE (mg/dL)   Date Value   01/15/2020 0.77     Glucose (mg/dL)   Date Value   01/15/2020 118 (H)   04/04/2012 147 (H)     Hemoglobin A1C (%)   Date Value   01/13/2020 5.8     Microalb/Crt. Ratio (mcg/mg creat)   Date Value   05/15/2015 7              Subjective:      HPI  He is here today for follow-up on her bilateral carpal tunnel she went to the emergency room because she was having hand numbness and swelling and pain they gave her unfortunately a thumb spica splint instead of carpal tunnel cock-up wrist splints she then got some sort of a elastic band that she is wearing her hand she recently started working in a [employer] doing a lot of cutting and chopping and this is been for the last 3 months since about the time her wrist have been hurting her in her hands    Review of Systems:     Constitutional: Negative for fever, appetite change and fatigue. Family social and medical history reviewed and unchanged     HENT: Negative. Negative for nosebleeds, trouble swallowing and neck pain. Eyes: Negative for photophobia and visual disturbance. Respiratory: Negative. Negative for chest tightness and shortness of breath. Cardiovascular: Negative. Negative for chest pain and leg swelling. Gastrointestinal: Negative. Negative for abdominal pain and blood in stool. Endocrine: Negative for cold intolerance and polyuria. Genitourinary: Negative for dysuria and hematuria. Musculoskeletal: Negative. Skin: Negative for rash. Allergic/Immunologic: Negative. Neurological: Negative. Negative for dizziness, weakness and numbness. Hematological: Negative. Negative for adenopathy. Does not bruise/bleed easily. Psychiatric/Behavioral: Negative for sleep disturbance, dysphoric mood and  decreased concentration. The patient is not nervous/anxious.         Objective:     Physical Exam:     Nursing note and vitals

## 2020-05-22 ENCOUNTER — HOSPITAL ENCOUNTER (EMERGENCY)
Age: 40
Discharge: HOME | End: 2020-05-22
Attending: EMERGENCY MEDICINE
Payer: COMMERCIAL

## 2020-05-22 VITALS
OXYGEN SATURATION: 97 % | SYSTOLIC BLOOD PRESSURE: 114 MMHG | RESPIRATION RATE: 18 BRPM | HEART RATE: 72 BPM | DIASTOLIC BLOOD PRESSURE: 71 MMHG | TEMPERATURE: 98.4 F

## 2020-05-22 PROCEDURE — 99283 EMERGENCY DEPT VISIT LOW MDM: CPT

## 2020-05-22 ASSESSMENT — PAIN DESCRIPTION - FREQUENCY: FREQUENCY: CONTINUOUS

## 2020-05-22 ASSESSMENT — PAIN DESCRIPTION - ORIENTATION: ORIENTATION: RIGHT

## 2020-05-22 ASSESSMENT — PAIN DESCRIPTION - LOCATION: LOCATION: WRIST

## 2020-05-22 ASSESSMENT — PAIN SCALES - GENERAL: PAINLEVEL_OUTOF10: 9

## 2020-05-23 ASSESSMENT — ENCOUNTER SYMPTOMS
CONSTIPATION: 0
SORE THROAT: 0
DIARRHEA: 0
SHORTNESS OF BREATH: 0
ABDOMINAL PAIN: 0
BACK PAIN: 0
BLOOD IN STOOL: 0
COUGH: 0
WHEEZING: 0
VOMITING: 0
NAUSEA: 0

## 2020-05-23 NOTE — ED PROVIDER NOTES
[facility] ED  Emergency Department Encounter  EmergencyMedicine Resident     This patient was seen during the COVID-19 crisis. There were limited resources and those resources we did have had to be conserved for the sickest of patients. Bobby Ricardo  MRN: [id number]  Birthdate 1980  Date of evaluation: 5/23/20  PCP:  Radhika Sears DO    CHIEF COMPLAINT       Chief Complaint   Patient presents with    Wrist Pain       HISTORY OF PRESENT ILLNESS  (Location/Symptom, Timing/Onset, Context/Setting, Quality, Duration, Modifying Factors, Severity.)      Jamir Boggs is a 37 y.o. female who presents with right wrist pain, patient's been seen multiple times for likely carpal tunnel syndrome. She was given a splint by her family doctor states splint makes it worse. Explained she should typically just wear the splint at night and while performing activities but if it is making it worse she does not need to wear the splint. Explained there is nothing more we really need to do but will give her information for orthopedic follow-up patient was in agreement with this plan has been taking Aleve for pain relief. PAST MEDICAL / SURGICAL / SOCIAL / FAMILY HISTORY      has a past medical history of Agoraphobia, Anxiety, Depression, Diarrhea, Drug abuse, marijuana, Dysmenorrhea, Endometriosis, Fibroid, GERD (gastroesophageal reflux disease), History of nipple discharge, Hyperlipidemia, Hypertension, Hypothyroidism, Irritable bowel syndrome, LGSIL (low grade squamous intraepithelial dysplasia), Menorrhagia, Midline low back pain without sciatica, Mood swings, Ovarian cyst, Pelvic adhesions, Pelvic pain, Prolactin increased ([address] Rectal bleeding, Type II or unspecified type diabetes mellitus without mention of complication, not stated as uncontrolled, Vocal cord polyps, and Vomiting. has a past surgical history that includes Cholecystectomy;  Tubal ligation; [place] tooth  Lung Cancer Maternal Grandmother     Diabetes Maternal Grandmother     Lung Cancer Maternal Grandfather     Diabetes Maternal Grandfather     Breast Cancer Neg Hx     Cancer Neg Hx     Colon Cancer Neg Hx     Eclampsia Neg Hx     Hypertension Neg Hx     Ovarian Cancer Neg Hx      Labor Neg Hx     Spont Abortions Neg Hx     Stroke Neg Hx        Allergies:  Codeine; Chantix [varenicline tartrate]; Magnesium citrate; and Vicodin [hydrocodone-acetaminophen]    Home Medications:  Prior to Admission medications    Medication Sig Start Date End Date Taking?  Authorizing Provider   risperiDONE (RISPERDAL) 2 MG tablet Take 2 mg by mouth 2 times daily    Historical Provider, MD   Elastic Bandages & Supports (WRIST SPLINT/COCK-UP/RIGHT L) MISC 1 Device by Does not apply route nightly 20   Grace Maruqez DO   Elastic Bandages & Supports (WRIST SPLINT/COCK-UP/LEFT L) MISC 1 Device by Does not apply route nightly 20   Grace Marquez DO   ibuprofen (ADVIL;MOTRIN) 600 MG tablet Take 1 tablet by mouth every 6 hours as needed for Pain  Patient not taking: Reported on 2020   Kirsten Chatman DO   acetaminophen (APAP EXTRA STRENGTH) 500 MG tablet Take 2 tablets by mouth every 6 hours as needed for Pain  Patient not taking: Reported on 2020   Kirsten Chatman DO   Melatonin 5 MG SUBL  16   Historical Provider, MD MEDELLIN  (90 BASE) MCG/ACT inhaler INHALE 2 PUFFS EVERY 4 HOURS AS NEEDED FOR WHEEZING  Patient not taking: Reported on 2020   Grace Marquez DO   divalproex (DEPAKOTE ER) 250 MG ER tablet  16   Historical Provider, MD   metFORMIN (GLUCOPHAGE) 1000 MG tablet TAKE 1 TABLET TWICE A DAY  Patient not taking: Reported on 2020   Gary Marquez DO   Respiratory Therapy Supplies (NEBULIZER/TUBING/MOUTHPIECE) KIT 1 kit by Does not apply route daily as needed  Patient not taking: Reported on 2020 3/4/16   Gary Marquez DO

## 2020-06-10 ENCOUNTER — OFFICE VISIT (OUTPATIENT)
Dept: ORTHOPEDIC SURGERY | Age: 40
End: 2020-06-10
Payer: COMMERCIAL

## 2020-06-10 VITALS — BODY MASS INDEX: 33.39 KG/M2 | WEIGHT: 181.44 LBS | HEIGHT: 62 IN

## 2020-06-10 PROCEDURE — 99204 OFFICE O/P NEW MOD 45 MIN: CPT | Performed by: STUDENT IN AN ORGANIZED HEALTH CARE EDUCATION/TRAINING PROGRAM

## 2020-06-10 NOTE — PROGRESS NOTES
Medical: Not on file     Non-medical: Not on file   Tobacco Use    Smoking status: Current Every Day Smoker     Packs/day: 1.50     Years: 15.00     Pack years: 22.50     Types: Cigarettes    Smokeless tobacco: Never Used   Substance and Sexual Activity    Alcohol use: No    Drug use: Yes     Types: Marijuana     Comment: everyday    Sexual activity: Yes     Partners: Male     Birth control/protection: Surgical     Comment: HETAL Right Salpingectomy   Lifestyle    Physical activity     Days per week: Not on file     Minutes per session: Not on file    Stress: Not on file   Relationships    Social connections     Talks on phone: Not on file     Gets together: Not on file     Attends Jehovah's witness service: Not on file     Active member of club or organization: Not on file     Attends meetings of clubs or organizations: Not on file     Relationship status: Not on file    Intimate partner violence     Fear of current or ex partner: Not on file     Emotionally abused: Not on file     Physically abused: Not on file     Forced sexual activity: Not on file   Other Topics Concern    Not on file   Social History Narrative    Not on file       Family History:  Family History   Problem Relation Age of Onset    Kidney Disease Mother     Diabetes Mother     Heart Disease Mother     Lung Cancer Maternal Grandmother     Diabetes Maternal Grandmother     Lung Cancer Maternal Grandfather     Diabetes Maternal Grandfather     Breast Cancer Neg Hx     Cancer Neg Hx     Colon Cancer Neg Hx     Eclampsia Neg Hx     Hypertension Neg Hx     Ovarian Cancer Neg Hx      Labor Neg Hx     Spont Abortions Neg Hx     Stroke Neg Hx          REVIEW OF SYSTEMS:  Review of Systems   Constitutional: Negative for fever and chills. HENT: Negative for congestion and eye pain. Eyes: Negative for blurred vision and double vision. Respiratory: Negative for cough, shortness of breath and wheezing.     Cardiovascular: for R carpal tunnel release. All treatment options including conservative versus surgical were discussed with the patient in detail. All questions answered appropriately. Surgical risks including but not limited to: bleeding, blood clots, wound complications, infection, damage to surrounding tissues/nerves/vessels, need for further surgery, loss of motion, stiffness, residual pain (Pillar pain), risks of anesthesia, loss of limb and loss of life were all discussed with the patient. Knowing these risks, patient wishes to proceed with surgery. -Given her medical history we will get her scheduled for preadmission testing  -We did discuss that we will likely leave her sutures in a little bit longer given her diabetic history to prevent wound dehiscence.  -We also did discuss the possibility of prolonged scar pain as well as pillar pain and the patient verbalized understanding.  -Follow-up 2 weeks postoperatively. If she is doing well at that time we may return her back to work as the patient financially needs to return sooner rather than later. Return in about 2 weeks (around 6/24/2020) for Post-op. No orders of the defined types were placed in this encounter. No orders of the defined types were placed in this encounter.        Electronically signed by Baldev Hankins DO PGY-5 on 6/10/2020 at 1:49 PM      (Please note that portions of this note were completed with a voice recognitionprogram. Efforts were made to edit the dictations but occasionally words are mis-transcribed.)

## 2020-06-16 ENCOUNTER — HOSPITAL ENCOUNTER (OUTPATIENT)
Dept: PREADMISSION TESTING | Age: 40
Discharge: HOME | End: 2020-06-20
Payer: COMMERCIAL

## 2020-06-16 VITALS
WEIGHT: 182.38 LBS | OXYGEN SATURATION: 95 % | HEART RATE: 81 BPM | TEMPERATURE: 97.9 F | HEIGHT: 63 IN | RESPIRATION RATE: 20 BRPM | BODY MASS INDEX: 32.32 KG/M2 | SYSTOLIC BLOOD PRESSURE: 114 MMHG | DIASTOLIC BLOOD PRESSURE: 76 MMHG

## 2020-06-16 LAB
ALBUMIN SERPL-MCNC: 4.2 G/DL (ref 3.5–5.2)
ALBUMIN/GLOBULIN RATIO: 1.4 (ref 1–2.5)
ALP BLD-CCNC: 75 U/L (ref 35–104)
ALT SERPL-CCNC: 15 U/L (ref 5–33)
ANION GAP SERPL CALCULATED.3IONS-SCNC: 13 MMOL/L (ref 9–17)
AST SERPL-CCNC: 13 U/L
BILIRUB SERPL-MCNC: 0.33 MG/DL (ref 0.3–1.2)
BILIRUBIN URINE: NEGATIVE
BUN BLDV-MCNC: 13 MG/DL (ref 6–20)
BUN/CREAT BLD: ABNORMAL (ref 9–20)
CALCIUM SERPL-MCNC: 9.2 MG/DL (ref 8.6–10.4)
CHLORIDE BLD-SCNC: 102 MMOL/L (ref 98–107)
CO2: 25 MMOL/L (ref 20–31)
COLOR: YELLOW
COMMENT UA: NORMAL
CREAT SERPL-MCNC: 0.73 MG/DL (ref 0.5–0.9)
GFR AFRICAN AMERICAN: >60 ML/MIN
GFR NON-AFRICAN AMERICAN: >60 ML/MIN
GFR SERPL CREATININE-BSD FRML MDRD: ABNORMAL ML/MIN/{1.73_M2}
GFR SERPL CREATININE-BSD FRML MDRD: ABNORMAL ML/MIN/{1.73_M2}
GLUCOSE BLD-MCNC: 109 MG/DL (ref 70–99)
GLUCOSE URINE: NEGATIVE
HCT VFR BLD CALC: 47.5 % (ref 36.3–47.1)
HEMOGLOBIN: 15.7 G/DL (ref 11.9–15.1)
KETONES, URINE: NEGATIVE
LEUKOCYTE ESTERASE, URINE: NEGATIVE
MCH RBC QN AUTO: 29.9 PG (ref 25.2–33.5)
MCHC RBC AUTO-ENTMCNC: 33.1 G/DL (ref 28.4–34.8)
MCV RBC AUTO: 90.5 FL (ref 82.6–102.9)
NITRITE, URINE: NEGATIVE
NRBC AUTOMATED: 0 PER 100 WBC
PDW BLD-RTO: 13.3 % (ref 11.8–14.4)
PH UA: 5 (ref 5–8)
PLATELET # BLD: 221 K/UL (ref 138–453)
PMV BLD AUTO: 10.7 FL (ref 8.1–13.5)
POTASSIUM SERPL-SCNC: 4.4 MMOL/L (ref 3.7–5.3)
PROTEIN UA: NEGATIVE
RBC # BLD: 5.25 M/UL (ref 3.95–5.11)
SODIUM BLD-SCNC: 140 MMOL/L (ref 135–144)
SPECIFIC GRAVITY UA: 1.02 (ref 1–1.03)
TOTAL PROTEIN: 7.2 G/DL (ref 6.4–8.3)
TURBIDITY: CLEAR
URINE HGB: NEGATIVE
UROBILINOGEN, URINE: NORMAL
WBC # BLD: 6.6 K/UL (ref 3.5–11.3)

## 2020-06-16 PROCEDURE — 81003 URINALYSIS AUTO W/O SCOPE: CPT

## 2020-06-16 PROCEDURE — 36415 COLL VENOUS BLD VENIPUNCTURE: CPT

## 2020-06-16 PROCEDURE — 85027 COMPLETE CBC AUTOMATED: CPT

## 2020-06-16 PROCEDURE — 93005 ELECTROCARDIOGRAM TRACING: CPT | Performed by: ORTHOPAEDIC SURGERY

## 2020-06-16 PROCEDURE — 80053 COMPREHEN METABOLIC PANEL: CPT

## 2020-06-16 RX ORDER — SODIUM CHLORIDE, SODIUM LACTATE, POTASSIUM CHLORIDE, CALCIUM CHLORIDE 600; 310; 30; 20 MG/100ML; MG/100ML; MG/100ML; MG/100ML
1000 INJECTION, SOLUTION INTRAVENOUS CONTINUOUS
Status: CANCELLED | OUTPATIENT
Start: 2020-06-16

## 2020-06-16 ASSESSMENT — PAIN DESCRIPTION - LOCATION: LOCATION: HAND

## 2020-06-16 ASSESSMENT — PAIN DESCRIPTION - PAIN TYPE: TYPE: ACUTE PAIN

## 2020-06-16 ASSESSMENT — PAIN - FUNCTIONAL ASSESSMENT: PAIN_FUNCTIONAL_ASSESSMENT: PREVENTS OR INTERFERES WITH ALL ACTIVE AND SOME PASSIVE ACTIVITIES

## 2020-06-16 ASSESSMENT — PAIN DESCRIPTION - ORIENTATION: ORIENTATION: RIGHT

## 2020-06-16 ASSESSMENT — PAIN DESCRIPTION - ONSET: ONSET: AWAKENED FROM SLEEP

## 2020-06-16 ASSESSMENT — PAIN DESCRIPTION - PROGRESSION: CLINICAL_PROGRESSION: GRADUALLY WORSENING

## 2020-06-16 NOTE — H&P
Rectal bleeding, Type II or unspecified type diabetes mellitus without mention of complication, not stated as uncontrolled, Vocal cord polyps, and Vomiting. Past Surgical History   has a past surgical history that includes Cholecystectomy; Tubal ligation; [place] tooth extraction; Ovary removal; laparoscopy (1/23/2013); Hysterectomy (9/23/13); Enterocele repair (9/23/13); Abdominal adhesion surgery (9/23/13); Upper gastrointestinal endoscopy; Esophagoscopy (1/22/15); Colonoscopy (2/4/14); and Colonoscopy (7-14-15).        Medications   Scheduled Meds:  Current Outpatient Rx   Medication Sig Dispense Refill    risperiDONE (RISPERDAL) 2 MG tablet Take 2 mg by mouth 2 times daily      Elastic Bandages & Supports (WRIST SPLINT/COCK-UP/RIGHT L) MISC 1 Device by Does not apply route nightly 1 each 0    Elastic Bandages & Supports (WRIST SPLINT/COCK-UP/LEFT L) MISC 1 Device by Does not apply route nightly 1 each 0    ibuprofen (ADVIL;MOTRIN) 600 MG tablet Take 1 tablet by mouth every 6 hours as needed for Pain (Patient not taking: Reported on 5/20/2020) 15 tablet 0    acetaminophen (APAP EXTRA STRENGTH) 500 MG tablet Take 2 tablets by mouth every 6 hours as needed for Pain (Patient not taking: Reported on 5/20/2020) 30 tablet 0    Melatonin 5 MG SUBL       VENTOLIN  (90 BASE) MCG/ACT inhaler INHALE 2 PUFFS EVERY 4 HOURS AS NEEDED FOR WHEEZING (Patient not taking: Reported on 1/13/2020) 18 g 0    divalproex (DEPAKOTE ER) 250 MG ER tablet       metFORMIN (GLUCOPHAGE) 1000 MG tablet TAKE 1 TABLET TWICE A DAY (Patient not taking: Reported on 1/13/2020) 30 tablet 0    Respiratory Therapy Supplies (NEBULIZER/TUBING/MOUTHPIECE) KIT 1 kit by Does not apply route daily as needed (Patient not taking: Reported on 1/13/2020) 1 kit 0    dicyclomine (BENTYL) 10 MG capsule TAKE 1 CAPSULE FOUR TIMES A DAY AS NEEDED (Patient not taking: Reported on 1/13/2020) 60 capsule 5    budesonide-formoterol (SYMBICORT) 80-4.5 MCG/ACT AERO Inhale 2 puffs into the lungs 2 times daily Rinse mouth after use. (Patient not taking: Reported on 2020) 1 Inhaler 5    hydrocortisone (ANUSOL-HC) 25 MG suppository Place 1 suppository rectally 2 times daily as needed for Hemorrhoids (Patient not taking: Reported on 2020) 60 suppository 3    Blood Glucose Monitoring Suppl (FREESTYLE LITE) LC       albuterol (PROVENTIL) (2.5 MG/3ML) 0.083% nebulizer solution Take 3 mLs by nebulization every 6 hours as needed for Wheezing. (Patient not taking: Reported on 2020) 120 each 3    naproxen (NAPROSYN) 500 MG tablet Take 1 tablet by mouth 2 times daily. (Patient not taking: Reported on 2020) 60 tablet 0    BD SHARPS [address] USE AS DIRECTED (Patient not taking: Reported on 2020) 1 each 0    FREESTYLE TEST STRIPS strip TEST TWO TIMES A DAY AS DIRECTED (Patient not taking: Reported on 2020) 100 strip 3    BD PEN NEEDLE ROSANNE U/F 32G X 4 MM MISC       busPIRone (BUSPAR) 15 MG tablet Take 15 mg by mouth 3 times daily. Continuous Infusions:  PRN Meds:. Allergies  is allergic to codeine; chantix [varenicline tartrate]; magnesium citrate; and vicodin [hydrocodone-acetaminophen]. Family History    family history includes Diabetes in her maternal grandfather, maternal grandmother, and mother; Heart Disease in her mother; Kidney Disease in her mother; Durel Knights in her maternal grandfather and maternal grandmother.     Family Status   Relation Name Status    Mother      Father     Melinda Mayberry Brother  Alive    MGM  (Not Specified)    MGF  (Not Specified)    Neg Hx  (Not Specified)         Social History  Social History     Socioeconomic History    Marital status:      Spouse name: Not on file    Number of children: Not on file    Years of education: Not on file    Highest education level: Not on file   Occupational History    Occupation: unemployeed   Social Needs    Financial resource

## 2020-06-16 NOTE — ANESTHESIA PRE-OP
No    Medical or cardiac clearance ordered:                                         No    Anesthesiologist called:                                                                No    CATHY Maloney PA-C  Electronically signed 6/16/2020 at 2:42 PM Implemented All Universal Safety Interventions:  [place] to call system. Call bell, personal items and telephone within reach. Instruct patient to call for assistance. Room bathroom lighting operational. Non-slip footwear when patient is off stretcher. Physically safe environment: no spills, clutter or unnecessary equipment. Stretcher in lowest position, wheels locked, appropriate side rails in place.

## 2020-06-17 LAB
EKG ATRIAL RATE: 70 BPM
EKG P AXIS: 56 DEGREES
EKG P-R INTERVAL: 168 MS
EKG Q-T INTERVAL: 396 MS
EKG QRS DURATION: 94 MS
EKG QTC CALCULATION (BAZETT): 427 MS
EKG R AXIS: 48 DEGREES
EKG T AXIS: 25 DEGREES
EKG VENTRICULAR RATE: 70 BPM

## 2020-06-17 PROCEDURE — 93010 ELECTROCARDIOGRAM REPORT: CPT | Performed by: INTERNAL MEDICINE

## 2020-06-20 ENCOUNTER — HOSPITAL ENCOUNTER (OUTPATIENT)
Dept: PREADMISSION TESTING | Age: 40
Setting detail: SPECIMEN
Discharge: HOME | End: 2020-06-24
Payer: COMMERCIAL

## 2020-06-20 PROCEDURE — U0004 COV-19 TEST NON-CDC HGH THRU: HCPCS

## 2020-06-22 LAB
SARS-COV-2, PCR: NOT DETECTED
SARS-COV-2, RAPID: NORMAL
SARS-COV-2: NORMAL
SOURCE: NORMAL

## 2020-06-23 ENCOUNTER — TELEPHONE (OUTPATIENT)
Dept: PRIMARY CARE CLINIC | Age: 40
End: 2020-06-23

## 2020-06-24 ENCOUNTER — ANESTHESIA EVENT (OUTPATIENT)
Dept: OPERATING ROOM | Age: 40
End: 2020-06-24
Payer: COMMERCIAL

## 2020-06-24 ENCOUNTER — HOSPITAL ENCOUNTER (OUTPATIENT)
Age: 40
Setting detail: OUTPATIENT SURGERY
Discharge: HOME | End: 2020-06-24
Attending: ORTHOPAEDIC SURGERY | Admitting: ORTHOPAEDIC SURGERY
Payer: COMMERCIAL

## 2020-06-24 ENCOUNTER — ANESTHESIA (OUTPATIENT)
Dept: OPERATING ROOM | Age: 40
End: 2020-06-24
Payer: COMMERCIAL

## 2020-06-24 VITALS
BODY MASS INDEX: 33.49 KG/M2 | WEIGHT: 182 LBS | OXYGEN SATURATION: 99 % | TEMPERATURE: 96.8 F | SYSTOLIC BLOOD PRESSURE: 133 MMHG | HEART RATE: 78 BPM | DIASTOLIC BLOOD PRESSURE: 78 MMHG | RESPIRATION RATE: 18 BRPM | HEIGHT: 62 IN

## 2020-06-24 VITALS — OXYGEN SATURATION: 100 % | DIASTOLIC BLOOD PRESSURE: 70 MMHG | SYSTOLIC BLOOD PRESSURE: 113 MMHG

## 2020-06-24 LAB — GLUCOSE BLD-MCNC: 159 MG/DL (ref 65–105)

## 2020-06-24 PROCEDURE — 3600000013 HC SURGERY LEVEL 3 ADDTL 15MIN: Performed by: ORTHOPAEDIC SURGERY

## 2020-06-24 PROCEDURE — 6360000002 HC RX W HCPCS: Performed by: NURSE ANESTHETIST, CERTIFIED REGISTERED

## 2020-06-24 PROCEDURE — 7100000040 HC SPAR PHASE II RECOVERY - FIRST 15 MIN: Performed by: ORTHOPAEDIC SURGERY

## 2020-06-24 PROCEDURE — 3600000003 HC SURGERY LEVEL 3 BASE: Performed by: ORTHOPAEDIC SURGERY

## 2020-06-24 PROCEDURE — 82947 ASSAY GLUCOSE BLOOD QUANT: CPT

## 2020-06-24 PROCEDURE — 2500000003 HC RX 250 WO HCPCS: Performed by: NURSE ANESTHETIST, CERTIFIED REGISTERED

## 2020-06-24 PROCEDURE — 2709999900 HC NON-CHARGEABLE SUPPLY: Performed by: ORTHOPAEDIC SURGERY

## 2020-06-24 PROCEDURE — 3700000000 HC ANESTHESIA ATTENDED CARE: Performed by: ORTHOPAEDIC SURGERY

## 2020-06-24 PROCEDURE — 2500000003 HC RX 250 WO HCPCS: Performed by: ORTHOPAEDIC SURGERY

## 2020-06-24 PROCEDURE — 7100000041 HC SPAR PHASE II RECOVERY - ADDTL 15 MIN: Performed by: ORTHOPAEDIC SURGERY

## 2020-06-24 PROCEDURE — 2580000003 HC RX 258: Performed by: ANESTHESIOLOGY

## 2020-06-24 PROCEDURE — 64721 CARPAL TUNNEL SURGERY: CPT | Performed by: ORTHOPAEDIC SURGERY

## 2020-06-24 PROCEDURE — 3700000001 HC ADD 15 MINUTES (ANESTHESIA): Performed by: ORTHOPAEDIC SURGERY

## 2020-06-24 PROCEDURE — 2580000003 HC RX 258: Performed by: ORTHOPAEDIC SURGERY

## 2020-06-24 RX ORDER — ONDANSETRON 2 MG/ML
INJECTION INTRAMUSCULAR; INTRAVENOUS PRN
Status: DISCONTINUED | OUTPATIENT
Start: 2020-06-24 | End: 2020-06-24 | Stop reason: SDUPTHER

## 2020-06-24 RX ORDER — FENTANYL CITRATE 50 UG/ML
25 INJECTION, SOLUTION INTRAMUSCULAR; INTRAVENOUS EVERY 5 MIN PRN
Status: DISCONTINUED | OUTPATIENT
Start: 2020-06-24 | End: 2020-06-24 | Stop reason: HOSPADM

## 2020-06-24 RX ORDER — MAGNESIUM HYDROXIDE 1200 MG/15ML
LIQUID ORAL CONTINUOUS PRN
Status: COMPLETED | OUTPATIENT
Start: 2020-06-24 | End: 2020-06-24

## 2020-06-24 RX ORDER — PROPOFOL 10 MG/ML
INJECTION, EMULSION INTRAVENOUS CONTINUOUS PRN
Status: DISCONTINUED | OUTPATIENT
Start: 2020-06-24 | End: 2020-06-24 | Stop reason: SDUPTHER

## 2020-06-24 RX ORDER — BUPIVACAINE HYDROCHLORIDE AND EPINEPHRINE 5; 5 MG/ML; UG/ML
INJECTION, SOLUTION EPIDURAL; INTRACAUDAL; PERINEURAL PRN
Status: DISCONTINUED | OUTPATIENT
Start: 2020-06-24 | End: 2020-06-24 | Stop reason: ALTCHOICE

## 2020-06-24 RX ORDER — FENTANYL CITRATE 50 UG/ML
INJECTION, SOLUTION INTRAMUSCULAR; INTRAVENOUS PRN
Status: DISCONTINUED | OUTPATIENT
Start: 2020-06-24 | End: 2020-06-24 | Stop reason: SDUPTHER

## 2020-06-24 RX ORDER — LABETALOL HYDROCHLORIDE 5 MG/ML
5 INJECTION, SOLUTION INTRAVENOUS EVERY 10 MIN PRN
Status: DISCONTINUED | OUTPATIENT
Start: 2020-06-24 | End: 2020-06-24 | Stop reason: HOSPADM

## 2020-06-24 RX ORDER — SODIUM CHLORIDE, SODIUM LACTATE, POTASSIUM CHLORIDE, CALCIUM CHLORIDE 600; 310; 30; 20 MG/100ML; MG/100ML; MG/100ML; MG/100ML
1000 INJECTION, SOLUTION INTRAVENOUS CONTINUOUS
Status: DISCONTINUED | OUTPATIENT
Start: 2020-06-24 | End: 2020-06-24 | Stop reason: HOSPADM

## 2020-06-24 RX ORDER — ONDANSETRON 2 MG/ML
4 INJECTION INTRAMUSCULAR; INTRAVENOUS
Status: DISCONTINUED | OUTPATIENT
Start: 2020-06-24 | End: 2020-06-24 | Stop reason: HOSPADM

## 2020-06-24 RX ORDER — OXYCODONE HYDROCHLORIDE AND ACETAMINOPHEN 5; 325 MG/1; MG/1
1 TABLET ORAL EVERY 6 HOURS PRN
Qty: 12 TABLET | Refills: 0 | Status: SHIPPED | OUTPATIENT
Start: 2020-06-24 | End: 2020-06-27

## 2020-06-24 RX ORDER — LIDOCAINE HYDROCHLORIDE 10 MG/ML
INJECTION, SOLUTION EPIDURAL; INFILTRATION; INTRACAUDAL; PERINEURAL PRN
Status: DISCONTINUED | OUTPATIENT
Start: 2020-06-24 | End: 2020-06-24 | Stop reason: SDUPTHER

## 2020-06-24 RX ORDER — MIDAZOLAM HYDROCHLORIDE 1 MG/ML
INJECTION INTRAMUSCULAR; INTRAVENOUS PRN
Status: DISCONTINUED | OUTPATIENT
Start: 2020-06-24 | End: 2020-06-24 | Stop reason: SDUPTHER

## 2020-06-24 RX ORDER — DIPHENHYDRAMINE HYDROCHLORIDE 50 MG/ML
12.5 INJECTION INTRAMUSCULAR; INTRAVENOUS
Status: DISCONTINUED | OUTPATIENT
Start: 2020-06-24 | End: 2020-06-24 | Stop reason: HOSPADM

## 2020-06-24 RX ORDER — PROPOFOL 10 MG/ML
INJECTION, EMULSION INTRAVENOUS PRN
Status: DISCONTINUED | OUTPATIENT
Start: 2020-06-24 | End: 2020-06-24 | Stop reason: SDUPTHER

## 2020-06-24 RX ADMIN — FENTANYL CITRATE 25 MCG: 50 INJECTION INTRAMUSCULAR; INTRAVENOUS at 09:56

## 2020-06-24 RX ADMIN — SODIUM CHLORIDE, POTASSIUM CHLORIDE, SODIUM LACTATE AND CALCIUM CHLORIDE 1000 ML: 600; 310; 30; 20 INJECTION, SOLUTION INTRAVENOUS at 08:46

## 2020-06-24 RX ADMIN — ONDANSETRON 4 MG: 2 INJECTION, SOLUTION INTRAMUSCULAR; INTRAVENOUS at 09:40

## 2020-06-24 RX ADMIN — PROPOFOL 50 MG: 10 INJECTION, EMULSION INTRAVENOUS at 09:39

## 2020-06-24 RX ADMIN — FENTANYL CITRATE 25 MCG: 50 INJECTION INTRAMUSCULAR; INTRAVENOUS at 09:40

## 2020-06-24 RX ADMIN — MIDAZOLAM HYDROCHLORIDE 2 MG: 1 INJECTION, SOLUTION INTRAMUSCULAR; INTRAVENOUS at 09:37

## 2020-06-24 RX ADMIN — FENTANYL CITRATE 25 MCG: 50 INJECTION INTRAMUSCULAR; INTRAVENOUS at 09:37

## 2020-06-24 RX ADMIN — PROPOFOL 50 MG: 10 INJECTION, EMULSION INTRAVENOUS at 09:56

## 2020-06-24 RX ADMIN — LIDOCAINE HYDROCHLORIDE 50 MG: 10 INJECTION, SOLUTION EPIDURAL; INFILTRATION; INTRACAUDAL; PERINEURAL at 09:37

## 2020-06-24 RX ADMIN — PROPOFOL 50 MG: 10 INJECTION, EMULSION INTRAVENOUS at 09:47

## 2020-06-24 RX ADMIN — PROPOFOL 75 MCG/KG/MIN: 10 INJECTION, EMULSION INTRAVENOUS at 09:37

## 2020-06-24 ASSESSMENT — PULMONARY FUNCTION TESTS
PIF_VALUE: 1
PIF_VALUE: 0
PIF_VALUE: 1
PIF_VALUE: 0
PIF_VALUE: 1
PIF_VALUE: 0
PIF_VALUE: 1

## 2020-06-24 ASSESSMENT — PAIN - FUNCTIONAL ASSESSMENT: PAIN_FUNCTIONAL_ASSESSMENT: 0-10

## 2020-06-24 ASSESSMENT — PAIN SCALES - GENERAL
PAINLEVEL_OUTOF10: 0

## 2020-06-24 ASSESSMENT — LIFESTYLE VARIABLES: SMOKING_STATUS: 1

## 2020-06-24 NOTE — BRIEF OP NOTE
Brief Postoperative Note      Patient: Sam De Luna  YOB: 1980  MRN: [id number]    Date of Procedure: 6/24/2020    Pre-Op Diagnosis: RIGHT CARPAL TUNNEL SYNDROME    Post-Op Diagnosis: Same       Procedure(s):  CARPAL TUNNEL RELEASE (SUPINE) 3080 TABLE, ARM TABLE    Surgeon(s):  Latosha Mistry DO    Assistant:  * No surgical staff found *    Anesthesia: Monitor Anesthesia Care    Estimated Blood Loss (mL): 10 mL    TT: 15 minutes    Complications:  Other: Emesis    Specimens:   * No specimens in log *    Implants:  * No implants in log *      Drains: * No LDAs found *    Findings: See op note for details    Electronically signed by Fransisco Carter DO on 6/24/2020 at 9:16 AM

## 2020-06-24 NOTE — ANESTHESIA PRE PROCEDURE
grade squamous intraepithelial dysplasia) 10/11    Menorrhagia     Midline low back pain without sciatica 2016    Mood swings     Ovarian cyst     Pelvic adhesions     Pelvic pain     Prolactin increased ([address] ,     Rectal bleeding     Type II or unspecified type diabetes mellitus without mention of complication, not stated as uncontrolled     Dr. Jose Ascencio Vocal cord polyps     Vomiting     Wellness examination     Dr. Armando Palma last seen 2020     Past Surgical History:   Procedure Laterality Date    ABDOMINAL ADHESION SURGERY  13    CHOLECYSTECTOMY      COLONOSCOPY  14    Benign biopsies    COLONOSCOPY  7-14-15    incomplete/poor prep, hemorrhoids.     ENTEROCELE REPAIR  13    ESOPHAGOSCOPY  1/22/15    HYSTERECTOMY  13    HETAL, Right Salpingectomy, Enterocele, SHON    LAPAROSCOPY  2013    Diagnostic converted to [address]       due to cyst- right    TUBAL LIGATION      UPPER GASTROINTESTINAL ENDOSCOPY      WISDOM TOOTH EXTRACTION      x4     Social History     Tobacco Use    Smoking status: Current Every Day Smoker     Packs/day: 1.00     Years: 15.00     Pack years: 15.00     Types: Cigarettes    Smokeless tobacco: Never Used   Substance Use Topics    Alcohol use: No    Drug use: Yes     Types: Marijuana     Comment: everyday         Vital Signs (Current)   Vitals:    20 0828   BP: 118/73   Pulse: 65   Resp: 20   Temp: 97.2 °F (36.2 °C)   SpO2: 99%     Vital Signs Statistics (for past 48 hrs)     Temp  Av.2 °F (36.2 °C)  Min: 97.2 °F (36.2 °C)   Min taken time: 20 0828  Max: 97.2 °F (36.2 °C)   Max taken time: 20 0828  Pulse  Av  Min: 72   Min taken time: 20 0828  Max: 72   Max taken time: 20 0828  Resp  Av  Min: 20   Min taken time: 20 5625  Max: 20   Max taken time: 20 0828  BP  Min: 118/73   Min taken time: 20 9254  Max: 118/73   Max taken time: 20 0828  SpO2  Av %  Min: 99 %   Min taken time: 20 0828  Max: 99 %   Max taken time: 20 0828  BP Readings from Last 3 Encounters:   20 118/73   20 114/76   20 114/71       BMI  Body mass index is 33.29 kg/m².     CBC   Lab Results   Component Value Date    WBC 6.6 2020    RBC 5.25 2020    RBC 4.84 2012    HGB 15.7 2020    HCT 47.5 2020    MCV 90.5 2020    RDW 13.3 2020     2020     2012       CMP    Lab Results   Component Value Date     2020    K 4.4 2020     2020    CO2 25 2020    BUN 13 2020    CREATININE 0.73 2020    GFRAA >60 2020    LABGLOM >60 2020    GLUCOSE 109 2020    GLUCOSE 147 2012    PROT 7.2 2020    CALCIUM 9.2 2020    BILITOT 0.33 2020    ALKPHOS 75 2020    AST 13 2020    ALT 15 2020       BMP    Lab Results   Component Value Date     2020    K 4.4 2020     2020    CO2 25 2020    BUN 13 2020    CREATININE 0.73 2020    CALCIUM 9.2 2020    GFRAA >60 2020    LABGLOM >60 2020    GLUCOSE 109 2020    GLUCOSE 147 2012       POC Testing  Recent Labs     20  0843   POCGLU 159*       Coags    Lab Results   Component Value Date    PROTIME 10.2 2013    PROTIME 10.3 2011    INR 1.0 2013    APTT 28.5 2013       HCG (If Applicable)   Lab Results   Component Value Date    PREGTESTUR NEGATIVE 2013    HCG NEGATIVE 2014    HCGQUANT <2 2013        ABGs No results found for: PHART, PO2ART, HVD4SLR, BMO7ZEV, BEART, F9PWPDXS     Type & Screen (If Applicable)  No results found for: LABABO, [address]    Radiology (If Applicable)    Cardiac Testing (If Applicable)     EKG (If Applicable)   IRBBB        Medications prior to admission:   Prior to Admission medications    Medication Sig Start Date End Date Taking? Authorizing Provider   risperiDONE (RISPERDAL) 2 MG tablet Take 2 mg by mouth 2 times daily   Yes Historical Provider, MD   VENTOLIN  (90 BASE) MCG/ACT inhaler INHALE 2 PUFFS EVERY 4 HOURS AS NEEDED FOR WHEEZING 5/4/16  Yes Grace Marquez DO   divalproex (DEPAKOTE ER) 250 MG ER tablet  2/8/16  Yes Historical Provider, MD   budesonide-formoterol (SYMBICORT) 80-4.5 MCG/ACT AERO Inhale 2 puffs into the lungs 2 times daily Rinse mouth after use. 10/13/15  Yes Grace Marquez DO   albuterol (PROVENTIL) (2.5 MG/3ML) 0.083% nebulizer solution Take 3 mLs by nebulization every 6 hours as needed for Wheezing. 10/30/14  Yes Grace Marquez DO   busPIRone (BUSPAR) 15 MG tablet Take 15 mg by mouth 3 times daily.      Yes Historical Provider, MD   Elastic Bandages & Supports (WRIST SPLINT/COCK-UP/RIGHT L) MISC 1 Device by Does not apply route nightly 5/20/20   Graec Marquez, DO   Elastic Bandages & Supports (WRIST SPLINT/COCK-UP/LEFT L) MISC 1 Device by Does not apply route nightly 5/20/20   Grace Marquez, DO   ibuprofen (ADVIL;MOTRIN) 600 MG tablet Take 1 tablet by mouth every 6 hours as needed for Pain 5/11/20   Rohini Trant, DO   acetaminophen (APAP EXTRA STRENGTH) 500 MG tablet Take 2 tablets by mouth every 6 hours as needed for Pain 5/11/20   Rohini Trant, DO   Melatonin 5 MG SUBL  4/27/16   Historical Provider, MD   metFORMIN (GLUCOPHAGE) 1000 MG tablet TAKE 1 TABLET TWICE A DAY  Patient not taking: Reported on 1/13/2020 4/22/16   William Marquez, DO   Respiratory Therapy Supplies (NEBULIZER/TUBING/MOUTHPIECE) KIT 1 kit by Does not apply route daily as needed 3/4/16   Grace Marquez DO   dicyclomine (BENTYL) 10 MG capsule TAKE 1 CAPSULE FOUR TIMES A DAY AS NEEDED  Patient not taking: Reported on 1/13/2020 1/12/16   Grace Marquez DO   hydrocortisone (ANUSOL-HC) 25 MG suppository Place 1 suppository rectally 2 times daily as needed for Hemorrhoids  Patient not taking: Reported on 1/13/2020 8/26/15 no longer on meds-anxiety induced HTN    Hypothyroidism     Irritable bowel syndrome     LGSIL (low grade squamous intraepithelial dysplasia) 10/11    Menorrhagia     Midline low back pain without sciatica 1/26/2016    Mood swings     Ovarian cyst     Pelvic adhesions     Pelvic pain     Prolactin increased ([address] 2/12, 11/12    Rectal bleeding     Type II or unspecified type diabetes mellitus without mention of complication, not stated as uncontrolled     Dr. David Ramsey Vocal cord polyps     Vomiting     Wellness examination     Dr. Rere Dyson last seen 5/13/2020       Past Surgical History:        Procedure Laterality Date    ABDOMINAL ADHESION SURGERY  9/23/13    CHOLECYSTECTOMY      COLONOSCOPY  2/4/14    Benign biopsies    COLONOSCOPY  7-14-15    incomplete/poor prep, hemorrhoids.  ENTEROCELE REPAIR  9/23/13    ESOPHAGOSCOPY  1/22/15    HYSTERECTOMY  9/23/13    HETAL, Right Salpingectomy, Enterocele, SHON    LAPAROSCOPY  1/23/2013    Diagnostic converted to [address]       due to cyst- right    TUBAL LIGATION      UPPER GASTROINTESTINAL ENDOSCOPY      WISDOM TOOTH EXTRACTION      x4       Social History:    Social History     Tobacco Use    Smoking status: Current Every Day Smoker     Packs/day: 1.00     Years: 15.00     Pack years: 15.00     Types: Cigarettes    Smokeless tobacco: Never Used   Substance Use Topics    Alcohol use:  No                                Ready to quit: Not Answered  Counseling given: Not Answered      Vital Signs (Current):   Vitals:    06/24/20 0828   BP: 118/73   Pulse: 65   Resp: 20   Temp: 97.2 °F (36.2 °C)   TempSrc: Temporal   SpO2: 99%   Weight: 182 lb (82.6 kg)   Height: 5' 2\" (1.575 m)                                              BP Readings from Last 3 Encounters:   06/24/20 118/73   06/16/20 114/76   05/22/20 114/71       NPO Status: Time of last liquid consumption: 2330                        Time of last solid consumption: 2330                        Date of last liquid consumption: 06/23/20                        Date of last solid food consumption: 06/23/20    BMI:   Wt Readings from Last 3 Encounters:   06/24/20 182 lb (82.6 kg)   06/16/20 182 lb 6 oz (82.7 kg)   06/10/20 181 lb 7 oz (82.3 kg)     Body mass index is 33.29 kg/m².     CBC:   Lab Results   Component Value Date    WBC 6.6 06/16/2020    RBC 5.25 06/16/2020    RBC 4.84 04/04/2012    HGB 15.7 06/16/2020    HCT 47.5 06/16/2020    MCV 90.5 06/16/2020    RDW 13.3 06/16/2020     06/16/2020     04/04/2012       CMP:   Lab Results   Component Value Date     06/16/2020    K 4.4 06/16/2020     06/16/2020    CO2 25 06/16/2020    BUN 13 06/16/2020    CREATININE 0.73 06/16/2020    GFRAA >60 06/16/2020    LABGLOM >60 06/16/2020    GLUCOSE 109 06/16/2020    GLUCOSE 147 04/04/2012    PROT 7.2 06/16/2020    CALCIUM 9.2 06/16/2020    BILITOT 0.33 06/16/2020    ALKPHOS 75 06/16/2020    AST 13 06/16/2020    ALT 15 06/16/2020       POC Tests:   Recent Labs     06/24/20  0843   POCGLU 159*       Coags:   Lab Results   Component Value Date    PROTIME 10.2 09/17/2013    PROTIME 10.3 09/22/2011    INR 1.0 09/17/2013    APTT 28.5 09/17/2013       HCG (If Applicable):   Lab Results   Component Value Date    PREGTESTUR NEGATIVE 09/23/2013    HCG NEGATIVE 08/25/2014    HCGQUANT <2 09/17/2013        ABGs: No results found for: PHART, PO2ART, FNF7ORM, QSQ8WRU, BEART, O8RWQXZI     Type & Screen (If Applicable):  No results found for: LABABO, LABRH    Drug/Infectious Status (If Applicable):  Lab Results   Component Value Date    HEPCAB NONREACTIVE 05/19/2016       COVID-19 Screening (If Applicable):   Lab Results   Component Value Date    COVID19 Not Detected 06/20/2020         Anesthesia Evaluation   no history of anesthetic complications:   Airway: Mallampati: III     Neck ROM: full   Dental:          Pulmonary:   (+) current smoker    (-) recent URI  COPD: 1.5

## 2020-06-24 NOTE — H&P
CONSTITUTIONAL: Alert and oriented times 3, no acute distress and cooperative to examination. friendly and pleasant      SKIN: rash No     HEENT: Head is normocephalic, atraumatic. EOMI, PERRLA     Oral air way :slightly narrow Yes     NECK: neck supple, no lymphadenopathy noted, trachea midline and straight       2+ carotid, no bruit     LUNGS: Chest expands equally bilaterally upon respiration, no accessory muscle used. Ausculation reveals no adventitious breath sounds.     CARDIOVASCULAR: \"Heart sounds are normal.  Regular rate and rhythm without murmur,     ABDOMEN: Bowel sounds are present in all four quadrants , over weight       GENATALIA:Deferred.     NEUROLOGIC: \"CN II-XII are grossly intact.         EXTREMITIES: Pitting edema:  No,  Varicose veins: No      Dorsal pedal/posterior tibial pulses palpable:  Yes            Strength:  Not tested             Patient Active Problem List   Diagnosis    GERD (gastroesophageal reflux disease)    Hypercholesteremia    Panic anxiety syndrome    DM (diabetes mellitus) [address] [address]    IBS (irritable bowel syndrome)    Mood swings    Hypothyroidism    Constipation    Endometriosis    Agoraphobia    HETAL RS with enterocele and Ovarian Preservation 9/23/13    Left ankle pain    Diarrhea    Vomiting    Rectal bleeding    Midline low back pain without sciatica    Irritable bowel syndrome without diarrhea    Gastroesophageal reflux disease without esophagitis    Abdominal pain, generalized                   IMPRESSIONS:   1.   right carpal tunnel syndrome   2. does not have any pertinent problems on file.     CATHY Ernst PAC  Electronically signed 6/16/2020 at 2:44 PM        Scheduled for:   Right carpal tunnel release

## 2020-06-24 NOTE — FLOWSHEET NOTE
Attempted IV in left arm, unable to obtain. Pt states she would like nurse to try right arm. Nurse was not thinking about surgical site at the time and obtained IV in rt [facility].  Will notify anesthesia

## 2020-06-25 NOTE — OP NOTE
Operative Note    Patient: Alberto Rodrigues  YOB: 1980  MRN: [id number]     Date of Procedure: 6/24/2020     Pre-Op Diagnosis: RIGHT CARPAL TUNNEL SYNDROME     Post-Op Diagnosis: Same       Procedure(s):  RIGHT CARPAL TUNNEL RELEASE      Surgeon(s):  Oriana Jovel DO     Assistant:  Resident: Marquez Dennison DO; Tangela Rowan DO     Anesthesia: Local, Monitor Anesthesia Care     Estimated Blood Loss (mL): minimal     Tourniquet time: 9 minutes     Complications: None    INDICATIONS:   The patient is a 37 y.o. female who presented with  The alternatives, benefits, and risks of the procedure were explained in great length, including, but not limited to the risk of infection, bleeding, blood loss, scar formation, damage to surrounding structures, need for further procedure, delayed wound healing, recurrence, failure to resolve the symptoms, anesthesia risks, loss of function, loss of limb or life. The above was understood and the patient wished to proceed. The patient was then scheduled for surgery    PROCEDURE:   On the day of surgery the patient was met in the pre-operative unit where written consent was obtained and the operative site was marked with permanent marker. The patient was then transported to the operating room, was laid in the supine position with the right arm on the hand table. MAC anesthesia was induced. A well padded non sterile tourniquet was applied to the RUE. Appropriate antibiotics were being infused at this time. A time-out was held and after all members were in agreement we proceeded forward with surgery.     Under sterile conditions we began with local injection. We injected 10cc of a mixture of 5cc 1% lidocaine plain and 5cc 0.5% marcaine plain in the superficial skin overlying the carpal tunnel and wrist flexion creases, and then directly into the carpal tunnel itself.  The entire right arm was then prepped and draped in sterile fashion and the tourniquet was inflated to 250 mmHg after gravity exsanguination. A 3cm incision was made with a #15 blade in the palm and carried down under direct vision through the palmar fascia to the transverse carpal ligament. The transverse carpal ligament was incised and the underlying median nerve encountered, it was freed from the undersurface of the ligament and the ligament was released proximally and distally in its entirety with a pair of tenotomy scissors. The median nerve was kept out of harms way and in direct view at all times. The wound was copiously irrigated and an external neurolysis was then performed due to the nerve having a flattened and hour glass appearance and being encased in scar tissue. The wound was irrigated again and the tourniquet was let down for a total time of 9 minutes. We then utilized bipolar electrocautery to achieve adequate hemostasis. The incision was then closed with 3-0 nylon suture in horizontal mattress and simple interrupted fashion. Sterile adaptik, 4x4s, webril, and an ACE bandage were applied as dressing. Anesthesia was reversed and the patient was taken to postop recovery in stable condition.     Dr. Filipe Paez was present for and active in all critical aspects of the case.

## 2020-07-09 ENCOUNTER — OFFICE VISIT (OUTPATIENT)
Dept: ORTHOPEDIC SURGERY | Age: 40
End: 2020-07-09

## 2020-07-09 VITALS — WEIGHT: 182.1 LBS | BODY MASS INDEX: 33.51 KG/M2 | HEIGHT: 62 IN

## 2020-07-09 PROCEDURE — 99024 POSTOP FOLLOW-UP VISIT: CPT | Performed by: STUDENT IN AN ORGANIZED HEALTH CARE EDUCATION/TRAINING PROGRAM

## 2020-07-11 NOTE — PROGRESS NOTES
[facility]  [address] [facility] [address] [facility]  Dept: [phone]  Dept Fax: [phone]        Ambulatory Follow Up    Subjective:   HPI:  Emmanuel Padilla is a 37y.o. year old female who presents to our office today for post op right carpal tunnel on 6/24/20. Doing well. All her pain and numbness has improved. She is very happy. Her left is doing ok today and wants to hold off at this time. ROS: No fevers, chills, nausea, vomiting, shortness of breath, chest pain, numbness or tingling. Objective :   General: Emmanuel Padilla is a 37 y.o. female who is alert and oriented and sitting comfortably in our office. Neuro: Alert and oriented. Eyes: Extra-ocular muscles intact  Mouth: Oral mucosa moist. No perioral lesions  Pulm: Respirations unlabored and regular. Skin: Warm, well perfused  Psych: Patient has good fund of knowledge and displays understanging of exam, diagnosis, and plan. Ortho Exam  MS:  RUE: Incision healing well. Sutures removed today. No dehsicence, redness or drainage. Compartments soft. Median/Radial/Ulnar/AIN/PIN motor intact. Median/Radial/Ulnar nerve sensation intact to light touch. 2+ rad pulse. Radiology:   X-rays reviewed from EMR. No new imaging performed today.     Assessment/Plan:   Emmanuel Padilla is a 37y.o. year old female 2 weeks out from right carpal tunnel release    Sutures removed  Incision healing appropriately  Follow up as needed for left or any issues with right    ----------------------------------------  Micky Trinidad DO  PGY-3, Department [facility]– [place], [place], [place]

## 2020-09-01 RX ORDER — PANTOPRAZOLE SODIUM 40 MG/1
40 TABLET, DELAYED RELEASE ORAL
Qty: 30 TABLET | Refills: 5 | Status: SHIPPED | OUTPATIENT
Start: 2020-09-01 | End: 2021-03-08 | Stop reason: SDUPTHER

## 2020-09-01 NOTE — TELEPHONE ENCOUNTER
Ms. Cong Vickers called to get a refill on her heartburn medication. Please contact Ms. Cong Vickers ([phone]) regarding her refill request.     Last OV 05/20/20  Next OV Not scheduled    [facility] [address] [phone]

## 2020-10-19 ENCOUNTER — TELEPHONE (OUTPATIENT)
Dept: FAMILY MEDICINE CLINIC | Age: 40
End: 2020-10-19

## 2020-10-19 NOTE — TELEPHONE ENCOUNTER
Patient states that she has another double ear infection and would like for Dr. Tushar Nguyen to refill some medication for her. She also wants to remind Dr. Tushar Nguyen that she does get a yeast infection when taking the medication.

## 2021-02-09 ENCOUNTER — HOSPITAL ENCOUNTER (EMERGENCY)
Age: 41
Discharge: LEFT BEFORE DISCHARGE | End: 2021-02-09
Attending: EMERGENCY MEDICINE
Payer: COMMERCIAL

## 2021-02-09 ENCOUNTER — APPOINTMENT (OUTPATIENT)
Dept: GENERAL RADIOLOGY | Age: 41
End: 2021-02-09
Payer: COMMERCIAL

## 2021-02-09 ENCOUNTER — NURSE TRIAGE (OUTPATIENT)
Dept: OTHER | Facility: CLINIC | Age: 41
End: 2021-02-09

## 2021-02-09 ENCOUNTER — VIRTUAL VISIT (OUTPATIENT)
Dept: FAMILY MEDICINE CLINIC | Age: 41
End: 2021-02-09
Payer: COMMERCIAL

## 2021-02-09 VITALS
OXYGEN SATURATION: 100 % | RESPIRATION RATE: 14 BRPM | WEIGHT: 177 LBS | SYSTOLIC BLOOD PRESSURE: 130 MMHG | DIASTOLIC BLOOD PRESSURE: 59 MMHG | BODY MASS INDEX: 32.37 KG/M2 | TEMPERATURE: 98.1 F | HEART RATE: 89 BPM

## 2021-02-09 DIAGNOSIS — Z53.20 LEFT BEFORE TREATMENT COMPLETED: Primary | ICD-10-CM

## 2021-02-09 DIAGNOSIS — R23.2 HOT FLASHES NOT DUE TO MENOPAUSE: Primary | ICD-10-CM

## 2021-02-09 LAB
ABSOLUTE EOS #: 0.07 K/UL (ref 0–0.44)
ABSOLUTE IMMATURE GRANULOCYTE: 0.01 K/UL (ref 0–0.3)
ABSOLUTE LYMPH #: 1.64 K/UL (ref 1.1–3.7)
ABSOLUTE MONO #: 0.4 K/UL (ref 0.1–1.2)
ANION GAP SERPL CALCULATED.3IONS-SCNC: 11 MMOL/L (ref 9–17)
BASOPHILS # BLD: 1 % (ref 0–2)
BASOPHILS ABSOLUTE: 0.06 K/UL (ref 0–0.2)
BILIRUBIN, POC: NEGATIVE
BLOOD URINE, POC: NEGATIVE
BUN BLDV-MCNC: 12 MG/DL (ref 6–20)
BUN/CREAT BLD: 12 (ref 9–20)
CALCIUM SERPL-MCNC: 9.9 MG/DL (ref 8.6–10.4)
CHLORIDE BLD-SCNC: 100 MMOL/L (ref 98–107)
CHP ED QC CHECK: NORMAL
CLARITY, POC: CLEAR
CO2: 25 MMOL/L (ref 20–31)
COLOR, POC: YELLOW
CREAT SERPL-MCNC: 0.98 MG/DL (ref 0.5–0.9)
DIFFERENTIAL TYPE: ABNORMAL
EOSINOPHILS RELATIVE PERCENT: 1 % (ref 1–4)
GFR AFRICAN AMERICAN: >60 ML/MIN
GFR NON-AFRICAN AMERICAN: >60 ML/MIN
GFR SERPL CREATININE-BSD FRML MDRD: ABNORMAL ML/MIN/{1.73_M2}
GFR SERPL CREATININE-BSD FRML MDRD: ABNORMAL ML/MIN/{1.73_M2}
GLUCOSE BLD-MCNC: 119 MG/DL (ref 70–99)
GLUCOSE URINE, POC: NEGATIVE
HCT VFR BLD CALC: 47.9 % (ref 36.3–47.1)
HEMOGLOBIN: 16 G/DL (ref 11.9–15.1)
IMMATURE GRANULOCYTES: 0 %
KETONES, POC: NEGATIVE
LEUKOCYTE EST, POC: NEGATIVE
LYMPHOCYTES # BLD: 24 % (ref 24–43)
MCH RBC QN AUTO: 29.7 PG (ref 25.2–33.5)
MCHC RBC AUTO-ENTMCNC: 33.4 G/DL (ref 28.4–34.8)
MCV RBC AUTO: 89 FL (ref 82.6–102.9)
MONOCYTES # BLD: 6 % (ref 3–12)
NITRITE, POC: NEGATIVE
NRBC AUTOMATED: 0 PER 100 WBC
PDW BLD-RTO: 13.3 % (ref 11.8–14.4)
PH, POC: 6
PLATELET # BLD: 225 K/UL (ref 138–453)
PLATELET ESTIMATE: ABNORMAL
PMV BLD AUTO: 10.4 FL (ref 8.1–13.5)
POTASSIUM SERPL-SCNC: 4.3 MMOL/L (ref 3.7–5.3)
PROTEIN, POC: NEGATIVE
RBC # BLD: 5.38 M/UL (ref 3.95–5.11)
RBC # BLD: ABNORMAL 10*6/UL
SEG NEUTROPHILS: 68 % (ref 36–65)
SEGMENTED NEUTROPHILS ABSOLUTE COUNT: 4.69 K/UL (ref 1.5–8.1)
SODIUM BLD-SCNC: 136 MMOL/L (ref 135–144)
SPECIFIC GRAVITY, POC: 1.03
UROBILINOGEN, POC: 0.2
WBC # BLD: 6.9 K/UL (ref 3.5–11.3)
WBC # BLD: ABNORMAL 10*3/UL

## 2021-02-09 PROCEDURE — 80048 BASIC METABOLIC PNL TOTAL CA: CPT

## 2021-02-09 PROCEDURE — 81003 URINALYSIS AUTO W/O SCOPE: CPT

## 2021-02-09 PROCEDURE — 85025 COMPLETE CBC W/AUTO DIFF WBC: CPT

## 2021-02-09 PROCEDURE — 99442 PR PHYS/QHP TELEPHONE EVALUATION 11-20 MIN: CPT | Performed by: FAMILY MEDICINE

## 2021-02-09 PROCEDURE — 99283 EMERGENCY DEPT VISIT LOW MDM: CPT

## 2021-02-09 SDOH — ECONOMIC STABILITY: FOOD INSECURITY: WITHIN THE PAST 12 MONTHS, THE FOOD YOU BOUGHT JUST DIDN'T LAST AND YOU DIDN'T HAVE MONEY TO GET MORE.: SOMETIMES TRUE

## 2021-02-09 SDOH — ECONOMIC STABILITY: FOOD INSECURITY: WITHIN THE PAST 12 MONTHS, YOU WORRIED THAT YOUR FOOD WOULD RUN OUT BEFORE YOU GOT MONEY TO BUY MORE.: SOMETIMES TRUE

## 2021-02-09 ASSESSMENT — ENCOUNTER SYMPTOMS
DIARRHEA: 1
NAUSEA: 0
CONSTIPATION: 1
VOMITING: 0
SHORTNESS OF BREATH: 1

## 2021-02-09 ASSESSMENT — PATIENT HEALTH QUESTIONNAIRE - PHQ9
SUM OF ALL RESPONSES TO PHQ9 QUESTIONS 1 & 2: 0
SUM OF ALL RESPONSES TO PHQ QUESTIONS 1-9: 0
SUM OF ALL RESPONSES TO PHQ QUESTIONS 1-9: 0
1. LITTLE INTEREST OR PLEASURE IN DOING THINGS: 0

## 2021-02-09 ASSESSMENT — PAIN SCALES - GENERAL: PAINLEVEL_OUTOF10: 7

## 2021-02-09 NOTE — PROGRESS NOTES
Cam Tejada is a [address] y.o. female evaluated via telephone on 2/9/2021. Consent:  She and/or health care decision maker is aware that that she may receive a bill for this telephone service, depending on her insurance coverage, and has provided verbal consent to proceed: Yes      Documentation:  I communicated with the patient and/or health care decision maker about she is being seen today in telephone consultation stating for the last 3 weeks she has been having hot and cold episodes she states when she gets up in the morning she is drenched from sweat she is too hot even to have the water warm to take a shower she gets out she is cool down and later on she is extremely hot again is goes on and off throughout the day she had a hysterectomy with ovary removal several years ago  She states she has no other symptoms no headache no fatigue no body aches no congestion she did go to the emergency room and left AMA today. Details of this discussion including any medical advice provided: Recommended that we check some other labs looking for thyroid disorders or other etiologies of her course I suggested she turn her temperature down in her home      I affirm this is a Patient Initiated Episode with a Patient who has not had a related appointment within my department in the past 7 days or scheduled within the next 24 hours.     Patient identification was verified at the start of the visit: Yes    Total Time: minutes: 11-20 minutes    Note: not billable if this call serves to triage the patient into an appointment for the relevant concern      [address] [place] [address]

## 2021-02-09 NOTE — ED PROVIDER NOTES
The patient was seen and examined by me in conjunction with the resident. I agree with his/her assessment and treatment plan. Patient walked out without completing her treatment. She did not notify staff.      Dallas Motta MD  02/09/21 0599

## 2021-02-09 NOTE — ED PROVIDER NOTES
[facility] ED  EMERGENCY DEPARTMENT ENCOUNTER  RESIDENT    Pt Name: Elen Vital  MRN: [id number]  [place] 1980  Date of evaluation: 2/9/2021  PCP:  Jenni Ganser, DO    CHIEF COMPLAINT       Chief Complaint   Patient presents with    Constipation    Sweats         HISTORY OF PRESENT ILLNESS    Elen Vital is a 41 y.o. female who presents to [facility] ER today for complaints of intermittent constipation and diarrhea, coupled with night sweats. Relates the symptoms started approximately 10 days ago. Patient reports she stopped taking a few medications in the last 2 weeks (risperidone, BuSpar, Depakote). Patient had been being seen by a local psychiatrist for over 20 years but physician is no longer in the area and she has not found a new doctor yet. Relates right ear pain that started about a week ago but she washed her ear out with peroxide about 3 days ago and has had no further issue. No other complaints at this time. REVIEW OF SYSTEMS       Review of Systems   Constitutional: Positive for chills and fever. Respiratory: Positive for shortness of breath. Gastrointestinal: Positive for constipation and diarrhea. Negative for nausea and vomiting. Psychiatric/Behavioral: Negative for confusion. The patient is not nervous/anxious.           PAST MEDICAL HISTORY    has a past medical history of Agoraphobia, Anxiety, Depression, Diarrhea, Drug abuse, marijuana, Dysmenorrhea, Endometriosis, Fibroid, GERD (gastroesophageal reflux disease), History of nipple discharge, Hyperlipidemia, Hypertension, Hypothyroidism, Irritable bowel syndrome, LGSIL (low grade squamous intraepithelial dysplasia), Menorrhagia, Midline low back pain without sciatica, Mood swings, Ovarian cyst, Pelvic adhesions, Pelvic pain, Prolactin increased, Rectal bleeding, Type II or unspecified type diabetes mellitus without mention of complication, not stated as uncontrolled, Vocal cord polyps, Vomiting, and Wellness examination. SURGICAL HISTORY      has a past surgical history that includes Cholecystectomy; Tubal ligation; [place] tooth extraction; Ovary removal; laparoscopy (1/23/2013); Hysterectomy (9/23/13); Enterocele repair (9/23/13); Abdominal adhesion surgery (9/23/13); Upper gastrointestinal endoscopy; Esophagoscopy (1/22/15); Colonoscopy (2/4/14); Colonoscopy (7-14-15); Carpal tunnel release (Right, 06/24/2020); and Carpal tunnel release (Right, 6/24/2020).       CURRENT MEDICATIONS       Discharge Medication List as of 2/9/2021 11:02 AM      CONTINUE these medications which have NOT CHANGED    Details   pantoprazole (PROTONIX) 40 MG tablet TAKE 1 TABLET BY MOUTH DAILY (WITH BREAKFAST), Disp-30 tablet, R-5Normal      risperiDONE (RISPERDAL) 2 MG tablet Take 2 mg by mouth 2 times dailyHistorical Med      !! Elastic Bandages & Supports (WRIST SPLINT/COCK-UP/RIGHT L) MISC NIGHTLY Starting Wed 5/20/2020, Disp-1 each, R-0, Print      !! Elastic Bandages & Supports (WRIST SPLINT/COCK-UP/LEFT L) MISC NIGHTLY Starting Wed 5/20/2020, Disp-1 each, R-0, Print      ibuprofen (ADVIL;MOTRIN) 600 MG tablet Take 1 tablet by mouth every 6 hours as needed for Pain, Disp-15 tablet, R-0Print      acetaminophen (APAP EXTRA STRENGTH) 500 MG tablet Take 2 tablets by mouth every 6 hours as needed for Pain, Disp-30 tablet, R-0Print      Melatonin 5 MG SUBL Historical Med      VENTOLIN  (90 BASE) MCG/ACT inhaler INHALE 2 PUFFS EVERY 4 HOURS AS NEEDED FOR WHEEZING, Disp-18 g, R-0      divalproex (DEPAKOTE ER) 250 MG ER tablet Historical Med      metFORMIN (GLUCOPHAGE) 1000 MG tablet TAKE 1 TABLET TWICE A DAY, Disp-30 tablet, R-0      Respiratory Therapy Supplies (NEBULIZER/TUBING/MOUTHPIECE) KIT DAILY PRN Starting 3/4/2016, Until Discontinued, Disp-1 kit, R-0, Normal      dicyclomine (BENTYL) 10 MG capsule TAKE 1 CAPSULE FOUR TIMES A DAY AS NEEDED, Disp-60 capsule, R-5      budesonide-formoterol (SYMBICORT) 80-4.5 MCG/ACT AERO Inhale and her pulse is 89. Her respiration is 14 and oxygen saturation is 100%. Physical Exam  Constitutional:       Appearance: Normal appearance. Cardiovascular:      Rate and Rhythm: Normal rate and regular rhythm. Pulses: Normal pulses. Heart sounds: Normal heart sounds. Pulmonary:      Effort: Pulmonary effort is normal.   Abdominal:      General: There is no distension. Palpations: Abdomen is soft. There is no mass. Tenderness: There is no abdominal tenderness. Skin:     General: Skin is warm and dry. Neurological:      Mental Status: She is alert. DIFFERENTIAL DIAGNOSIS/MDM:   Patient left before treatment completed    DIAGNOSTIC RESULTS     EKG: All EKG's are interpreted by the Emergency Department Physician who either signs or Co-signs this chart in the absence of a cardiologist.    RADIOLOGY:   I directly visualized the following  images and reviewed the radiologist interpretations:  XR ABDOMEN (KUB) (SINGLE AP VIEW)    (Results Pending)       LABS:  Labs Reviewed   CBC WITH AUTO DIFFERENTIAL - Abnormal; Notable for the following components:       Result Value    RBC 5.38 (*)     Hemoglobin 16.0 (*)     Hematocrit 47.9 (*)     Seg Neutrophils 68 (*)     All other components within normal limits   BASIC METABOLIC PANEL - Abnormal; Notable for the following components:    Glucose 119 (*)     CREATININE 0.98 (*)     All other components within normal limits   POCT URINALYSIS DIPSTICK - Normal         EMERGENCY DEPARTMENT COURSE:   Vitals:    Vitals:    02/09/21 0905 02/09/21 0908   BP:  (!) 130/59   Pulse:  89   Resp:  14   Temp:  98.1 °F (36.7 °C)   SpO2:  100%   Weight: 177 lb (80.3 kg)        CONSULTS:  None       PROCEDURES:  Procedures      FINAL IMPRESSION      1. Left before treatment completed            DISPOSITION/PLAN   DISPOSITION Cassoday 02/09/2021 11:02:12 AM      Patient left before treatment completed.     PATIENT REFERRED TO:  No follow-up provider specified.     DISCHARGE MEDICATIONS:  Discharge Medication List as of 2/9/2021 11:02 AM          (Please note that portions of this note were completed with a voice recognition program.  Efforts were made to edit the dictations but occasionally words are mis-transcribed.)    [facility] Resident              Rahel Renee DPM  Resident  02/09/21 0636

## 2021-02-11 LAB
AVERAGE GLUCOSE: 126
FOLATE: NORMAL
HBA1C MFR BLD: 6 %
IRON: 54
MAGNESIUM: 1.8 MG/DL
T4 FREE: 0.98
TOTAL IRON BINDING CAPACITY: 361
TSH SERPL DL<=0.05 MIU/L-ACNC: 0.78 UIU/ML
VITAMIN B-12: 324

## 2021-02-12 DIAGNOSIS — R23.2 HOT FLASHES NOT DUE TO MENOPAUSE: ICD-10-CM

## 2021-02-15 DIAGNOSIS — R23.2 HOT FLASHES NOT DUE TO MENOPAUSE: ICD-10-CM

## 2021-02-22 ENCOUNTER — TELEPHONE (OUTPATIENT)
Dept: FAMILY MEDICINE CLINIC | Age: 41
End: 2021-02-22

## 2021-02-23 DIAGNOSIS — R23.2 HOT FLASHES NOT DUE TO MENOPAUSE: ICD-10-CM

## 2021-03-06 DIAGNOSIS — K21.00 GASTROESOPHAGEAL REFLUX DISEASE WITH ESOPHAGITIS: ICD-10-CM

## 2021-03-08 ENCOUNTER — TELEPHONE (OUTPATIENT)
Dept: FAMILY MEDICINE CLINIC | Age: 41
End: 2021-03-08

## 2021-03-08 DIAGNOSIS — J98.01 ACUTE BRONCHOSPASM: ICD-10-CM

## 2021-03-08 DIAGNOSIS — K21.00 GASTROESOPHAGEAL REFLUX DISEASE WITH ESOPHAGITIS: ICD-10-CM

## 2021-03-08 RX ORDER — BUDESONIDE AND FORMOTEROL FUMARATE DIHYDRATE 80; 4.5 UG/1; UG/1
2 AEROSOL RESPIRATORY (INHALATION) 2 TIMES DAILY
Qty: 1 INHALER | Refills: 5 | Status: ON HOLD | OUTPATIENT
Start: 2021-03-08 | End: 2021-09-02 | Stop reason: HOSPADM

## 2021-03-08 RX ORDER — PANTOPRAZOLE SODIUM 40 MG/1
40 TABLET, DELAYED RELEASE ORAL
Qty: 30 TABLET | Refills: 5 | Status: ON HOLD | OUTPATIENT
Start: 2021-03-08 | End: 2021-09-02 | Stop reason: HOSPADM

## 2021-03-08 RX ORDER — PANTOPRAZOLE SODIUM 40 MG/1
40 TABLET, DELAYED RELEASE ORAL
Qty: 30 TABLET | Refills: 5 | Status: ON HOLD | OUTPATIENT
Start: 2021-03-08 | End: 2021-08-30 | Stop reason: SDUPTHER

## 2021-03-08 RX ORDER — ALBUTEROL SULFATE 90 UG/1
AEROSOL, METERED RESPIRATORY (INHALATION)
Qty: 18 G | Refills: 2 | Status: SHIPPED | OUTPATIENT
Start: 2021-03-08 | End: 2021-07-31

## 2021-03-08 NOTE — TELEPHONE ENCOUNTER
Rachel Leon is calling to request a refill on the following medication(s):    Medication Request:  Requested Prescriptions     Pending Prescriptions Disp Refills    pantoprazole (PROTONIX) 40 MG tablet [Pharmacy Med Name: PANTOPRAZOLE 40MG 40 Tablet] 30 tablet 5     Sig: TAKE 1 TABLET BY MOUTH DAILY (WITH BREAKFAST)       Last Visit Date (If Applicable):  3/2/8252    Next Visit Date:    Visit date not found

## 2021-04-06 ENCOUNTER — TELEPHONE (OUTPATIENT)
Dept: FAMILY MEDICINE CLINIC | Age: 41
End: 2021-04-06

## 2021-04-06 DIAGNOSIS — J98.01 ACUTE BRONCHOSPASM: Primary | ICD-10-CM

## 2021-04-06 RX ORDER — ALBUTEROL SULFATE 2.5 MG/3ML
2.5 SOLUTION RESPIRATORY (INHALATION) EVERY 6 HOURS PRN
Qty: 120 EACH | Refills: 3 | Status: ON HOLD | OUTPATIENT
Start: 2021-04-06 | End: 2021-08-30 | Stop reason: SDUPTHER

## 2021-04-06 NOTE — TELEPHONE ENCOUNTER
Patient misplaced or lost her nebulizer and is requesting prescription for a new one, with meds and supplies.   Uses [employer] [phone]

## 2021-04-12 ENCOUNTER — TELEPHONE (OUTPATIENT)
Dept: FAMILY MEDICINE CLINIC | Age: 41
End: 2021-04-12

## 2021-04-12 DIAGNOSIS — J98.01 ACUTE BRONCHOSPASM: Primary | ICD-10-CM

## 2021-04-12 NOTE — TELEPHONE ENCOUNTER
Patient calling because she need [facility] patient states [address] no longer carries the machines.  Please advise

## 2021-07-26 ENCOUNTER — TELEPHONE (OUTPATIENT)
Dept: FAMILY MEDICINE CLINIC | Age: 41
End: 2021-07-26

## 2021-07-26 NOTE — TELEPHONE ENCOUNTER
----- Message from Rachelle Emirsuzie sent at 7/26/2021  2:40 PM EDT -----  Subject: Message to Provider    QUESTIONS  Information for Provider? Patient stated that she is having bad heartburn,   stomach bloating and her stomach feels like it is on fire. Patient did not   want an appointment but stated that she is going to go to the ER for the   symptoms. ---------------------------------------------------------------------------  --------------  Janak HIDALGO  What is the best way for the office to contact you? OK to leave message on   voicemail  Preferred Call Back Phone Number? [phone]  ---------------------------------------------------------------------------  --------------  SCRIPT ANSWERS  Relationship to Patient?  Self

## 2021-07-26 NOTE — TELEPHONE ENCOUNTER
----- Message from Shonda Estes sent at 7/26/2021  2:38 PM EDT -----  Subject: Referral Request    QUESTIONS   Reason for referral request? GI Doctor   Has the physician seen you for this condition before? No   Preferred Specialist (if applicable)? Do you already have an appointment scheduled? No  Additional Information for Provider? Patient needs a referral for a new GI   doctor. ---------------------------------------------------------------------------  --------------  Maureen Sanches INFO  What is the best way for the office to contact you? OK to leave message on   voicemail  Preferred Call Back Phone Number?  [phone]

## 2021-07-26 NOTE — TELEPHONE ENCOUNTER
She will have to find out who is on Medicaid that she can see they were the only group I know that takes her insurance and apparently she was dismissed for no-shows

## 2021-07-31 RX ORDER — ALBUTEROL SULFATE 90 UG/1
AEROSOL, METERED RESPIRATORY (INHALATION)
Qty: 18 G | Refills: 2 | Status: ON HOLD | OUTPATIENT
Start: 2021-07-31 | End: 2021-09-02 | Stop reason: HOSPADM

## 2021-08-29 ENCOUNTER — HOSPITAL ENCOUNTER (INPATIENT)
Age: 41
LOS: 4 days | Discharge: HOME | DRG: 751 | End: 2021-09-02
Attending: EMERGENCY MEDICINE | Admitting: PSYCHIATRY & NEUROLOGY
Payer: COMMERCIAL

## 2021-08-29 DIAGNOSIS — R45.851 SUICIDAL IDEATION: Primary | ICD-10-CM

## 2021-08-29 PROBLEM — F32.3 MAJOR DEPRESSION WITH PSYCHOTIC FEATURES (HCC): Status: ACTIVE | Noted: 2021-08-29

## 2021-08-29 LAB
ABSOLUTE EOS #: 0.1 K/UL (ref 0–0.4)
ABSOLUTE IMMATURE GRANULOCYTE: ABNORMAL K/UL (ref 0–0.3)
ABSOLUTE LYMPH #: 1.1 K/UL (ref 1–4.8)
ABSOLUTE MONO #: 0.4 K/UL (ref 0.1–1.3)
ALBUMIN SERPL-MCNC: 4.1 G/DL (ref 3.5–5.2)
ALBUMIN/GLOBULIN RATIO: ABNORMAL (ref 1–2.5)
ALP BLD-CCNC: 81 U/L (ref 35–104)
ALT SERPL-CCNC: 26 U/L (ref 5–33)
AMPHETAMINE SCREEN URINE: NEGATIVE
ANION GAP SERPL CALCULATED.3IONS-SCNC: 9 MMOL/L (ref 9–17)
AST SERPL-CCNC: 24 U/L
BARBITURATE SCREEN URINE: NEGATIVE
BASOPHILS # BLD: 1 % (ref 0–2)
BASOPHILS ABSOLUTE: 0 K/UL (ref 0–0.2)
BENZODIAZEPINE SCREEN, URINE: NEGATIVE
BILIRUB SERPL-MCNC: 0.16 MG/DL (ref 0.3–1.2)
BUN BLDV-MCNC: 10 MG/DL (ref 6–20)
BUN/CREAT BLD: ABNORMAL (ref 9–20)
BUPRENORPHINE URINE: ABNORMAL
CALCIUM SERPL-MCNC: 9.2 MG/DL (ref 8.6–10.4)
CANNABINOID SCREEN URINE: POSITIVE
CHLORIDE BLD-SCNC: 103 MMOL/L (ref 98–107)
CO2: 26 MMOL/L (ref 20–31)
COCAINE METABOLITE, URINE: POSITIVE
CREAT SERPL-MCNC: 1.06 MG/DL (ref 0.5–0.9)
DIFFERENTIAL TYPE: ABNORMAL
EOSINOPHILS RELATIVE PERCENT: 1 % (ref 0–4)
ETHANOL PERCENT: <0.01 %
ETHANOL: <10 MG/DL
GFR AFRICAN AMERICAN: >60 ML/MIN
GFR NON-AFRICAN AMERICAN: 57 ML/MIN
GFR SERPL CREATININE-BSD FRML MDRD: ABNORMAL ML/MIN/{1.73_M2}
GFR SERPL CREATININE-BSD FRML MDRD: ABNORMAL ML/MIN/{1.73_M2}
GLUCOSE BLD-MCNC: 139 MG/DL (ref 70–99)
HCG QUALITATIVE: NEGATIVE
HCT VFR BLD CALC: 43.1 % (ref 36–46)
HEMOGLOBIN: 14.3 G/DL (ref 12–16)
IMMATURE GRANULOCYTES: ABNORMAL %
LYMPHOCYTES # BLD: 31 % (ref 24–44)
MCH RBC QN AUTO: 29.7 PG (ref 26–34)
MCHC RBC AUTO-ENTMCNC: 33.1 G/DL (ref 31–37)
MCV RBC AUTO: 89.6 FL (ref 80–100)
MDMA URINE: ABNORMAL
METHADONE SCREEN, URINE: NEGATIVE
METHAMPHETAMINE, URINE: ABNORMAL
MONOCYTES # BLD: 11 % (ref 1–7)
NRBC AUTOMATED: ABNORMAL PER 100 WBC
OPIATES, URINE: NEGATIVE
OXYCODONE SCREEN URINE: NEGATIVE
PDW BLD-RTO: 14.1 % (ref 11.5–14.9)
PHENCYCLIDINE, URINE: NEGATIVE
PLATELET # BLD: 195 K/UL (ref 150–450)
PLATELET ESTIMATE: ABNORMAL
PMV BLD AUTO: 8.4 FL (ref 6–12)
POTASSIUM SERPL-SCNC: 3.6 MMOL/L (ref 3.7–5.3)
PROPOXYPHENE, URINE: ABNORMAL
RBC # BLD: 4.81 M/UL (ref 4–5.2)
RBC # BLD: ABNORMAL 10*6/UL
SARS-COV-2, RAPID: NOT DETECTED
SEG NEUTROPHILS: 56 % (ref 36–66)
SEGMENTED NEUTROPHILS ABSOLUTE COUNT: 2.1 K/UL (ref 1.3–9.1)
SODIUM BLD-SCNC: 138 MMOL/L (ref 135–144)
SPECIMEN DESCRIPTION: NORMAL
TEST INFORMATION: ABNORMAL
TOTAL PROTEIN: 6.6 G/DL (ref 6.4–8.3)
TRICYCLIC ANTIDEPRESSANTS, UR: ABNORMAL
WBC # BLD: 3.7 K/UL (ref 3.5–11)
WBC # BLD: ABNORMAL 10*3/UL

## 2021-08-29 PROCEDURE — APPSS60 APP SPLIT SHARED TIME 46-60 MINUTES

## 2021-08-29 PROCEDURE — 84703 CHORIONIC GONADOTROPIN ASSAY: CPT

## 2021-08-29 PROCEDURE — 85025 COMPLETE CBC W/AUTO DIFF WBC: CPT

## 2021-08-29 PROCEDURE — 87635 SARS-COV-2 COVID-19 AMP PRB: CPT

## 2021-08-29 PROCEDURE — 6370000000 HC RX 637 (ALT 250 FOR IP): Performed by: PSYCHIATRY & NEUROLOGY

## 2021-08-29 PROCEDURE — 99283 EMERGENCY DEPT VISIT LOW MDM: CPT

## 2021-08-29 PROCEDURE — 36415 COLL VENOUS BLD VENIPUNCTURE: CPT

## 2021-08-29 PROCEDURE — G0480 DRUG TEST DEF 1-7 CLASSES: HCPCS

## 2021-08-29 PROCEDURE — 80053 COMPREHEN METABOLIC PANEL: CPT

## 2021-08-29 PROCEDURE — 1240000000 HC EMOTIONAL WELLNESS R&B

## 2021-08-29 PROCEDURE — 80307 DRUG TEST PRSMV CHEM ANLYZR: CPT

## 2021-08-29 RX ORDER — NICOTINE 21 MG/24HR
1 PATCH, TRANSDERMAL 24 HOURS TRANSDERMAL DAILY
Status: DISCONTINUED | OUTPATIENT
Start: 2021-08-30 | End: 2021-09-02 | Stop reason: HOSPADM

## 2021-08-29 RX ORDER — DIPHENHYDRAMINE HYDROCHLORIDE 50 MG/ML
50 INJECTION INTRAMUSCULAR; INTRAVENOUS EVERY 4 HOURS PRN
Status: DISCONTINUED | OUTPATIENT
Start: 2021-08-29 | End: 2021-09-02 | Stop reason: HOSPADM

## 2021-08-29 RX ORDER — POLYETHYLENE GLYCOL 3350 17 G/17G
17 POWDER, FOR SOLUTION ORAL DAILY PRN
Status: DISCONTINUED | OUTPATIENT
Start: 2021-08-29 | End: 2021-09-02 | Stop reason: HOSPADM

## 2021-08-29 RX ORDER — ACETAMINOPHEN 325 MG/1
650 TABLET ORAL EVERY 4 HOURS PRN
Status: DISCONTINUED | OUTPATIENT
Start: 2021-08-29 | End: 2021-09-02 | Stop reason: HOSPADM

## 2021-08-29 RX ORDER — HALOPERIDOL 5 MG
5 TABLET ORAL EVERY 4 HOURS PRN
Status: DISCONTINUED | OUTPATIENT
Start: 2021-08-29 | End: 2021-09-02 | Stop reason: HOSPADM

## 2021-08-29 RX ORDER — HYDROXYZINE 50 MG/1
50 TABLET, FILM COATED ORAL 3 TIMES DAILY PRN
Status: DISCONTINUED | OUTPATIENT
Start: 2021-08-29 | End: 2021-09-02 | Stop reason: HOSPADM

## 2021-08-29 RX ORDER — IBUPROFEN 400 MG/1
400 TABLET ORAL EVERY 6 HOURS PRN
Status: DISCONTINUED | OUTPATIENT
Start: 2021-08-29 | End: 2021-09-02 | Stop reason: HOSPADM

## 2021-08-29 RX ORDER — POTASSIUM CHLORIDE 20 MEQ/1
40 TABLET, EXTENDED RELEASE ORAL ONCE
Status: DISCONTINUED | OUTPATIENT
Start: 2021-08-29 | End: 2021-09-01

## 2021-08-29 RX ORDER — LORAZEPAM 1 MG/1
2 TABLET ORAL EVERY 4 HOURS PRN
Status: DISCONTINUED | OUTPATIENT
Start: 2021-08-29 | End: 2021-09-02 | Stop reason: HOSPADM

## 2021-08-29 RX ORDER — HALOPERIDOL 5 MG/ML
5 INJECTION INTRAMUSCULAR EVERY 4 HOURS PRN
Status: DISCONTINUED | OUTPATIENT
Start: 2021-08-29 | End: 2021-09-02 | Stop reason: HOSPADM

## 2021-08-29 RX ORDER — TRAZODONE HYDROCHLORIDE 50 MG/1
50 TABLET ORAL NIGHTLY PRN
Status: DISCONTINUED | OUTPATIENT
Start: 2021-08-29 | End: 2021-09-02 | Stop reason: HOSPADM

## 2021-08-29 RX ORDER — MAGNESIUM HYDROXIDE/ALUMINUM HYDROXICE/SIMETHICONE 120; 1200; 1200 MG/30ML; MG/30ML; MG/30ML
30 SUSPENSION ORAL EVERY 6 HOURS PRN
Status: DISCONTINUED | OUTPATIENT
Start: 2021-08-29 | End: 2021-09-02 | Stop reason: HOSPADM

## 2021-08-29 RX ORDER — LORAZEPAM 2 MG/ML
2 INJECTION INTRAMUSCULAR EVERY 4 HOURS PRN
Status: DISCONTINUED | OUTPATIENT
Start: 2021-08-29 | End: 2021-09-02 | Stop reason: HOSPADM

## 2021-08-29 RX ADMIN — TRAZODONE HYDROCHLORIDE 50 MG: 50 TABLET ORAL at 20:45

## 2021-08-29 RX ADMIN — HYDROXYZINE HYDROCHLORIDE 50 MG: 50 TABLET, FILM COATED ORAL at 20:45

## 2021-08-29 RX ADMIN — ACETAMINOPHEN 650 MG: 325 TABLET, FILM COATED ORAL at 21:22

## 2021-08-29 ASSESSMENT — PAIN SCALES - GENERAL
PAINLEVEL_OUTOF10: 0
PAINLEVEL_OUTOF10: 0
PAINLEVEL_OUTOF10: 3

## 2021-08-29 ASSESSMENT — PATIENT HEALTH QUESTIONNAIRE - PHQ9: SUM OF ALL RESPONSES TO PHQ QUESTIONS 1-9: 12

## 2021-08-29 ASSESSMENT — SLEEP AND FATIGUE QUESTIONNAIRES
DO YOU USE A SLEEP AID: NO
DIFFICULTY FALLING ASLEEP: YES
RESTFUL SLEEP: NO
DO YOU HAVE DIFFICULTY SLEEPING: YES
DIFFICULTY ARISING: NO
AVERAGE NUMBER OF SLEEP HOURS: 2
SLEEP PATTERN: INSOMNIA;DIFFICULTY FALLING ASLEEP
DIFFICULTY STAYING ASLEEP: YES

## 2021-08-29 ASSESSMENT — ENCOUNTER SYMPTOMS
SHORTNESS OF BREATH: 0
EYE REDNESS: 0
SORE THROAT: 0
CONSTIPATION: 0
COUGH: 0
DIARRHEA: 0
EYE PAIN: 0
CHEST TIGHTNESS: 0
ABDOMINAL PAIN: 0
BACK PAIN: 0
NAUSEA: 0
VOMITING: 0

## 2021-08-29 ASSESSMENT — PAIN DESCRIPTION - PAIN TYPE: TYPE: ACUTE PAIN

## 2021-08-29 ASSESSMENT — LIFESTYLE VARIABLES: HISTORY_ALCOHOL_USE: NO

## 2021-08-29 ASSESSMENT — PAIN DESCRIPTION - LOCATION: LOCATION: EYE
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
## 2021-08-29 NOTE — ED TRIAGE NOTES
Patient to ed for a psych evaluation. Patient states she has been out of medications for about a year.
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
## 2021-08-29 NOTE — ED NOTES
[facility] called to initiate case, vm was left with call back number to [facility].     @ [address] [facility] diana called and Dr Roberts Mins to admit under [facility] with psychosis voluntary and bed request faxed to [facility].

## 2021-08-30 PROBLEM — F31.9 BIPOLAR DISORDER WITH PSYCHOTIC FEATURES (HCC): Status: ACTIVE | Noted: 2021-08-30

## 2021-08-30 PROBLEM — F14.90 COCAINE USE: Status: ACTIVE | Noted: 2021-08-30

## 2021-08-30 PROCEDURE — 1240000000 HC EMOTIONAL WELLNESS R&B

## 2021-08-30 PROCEDURE — 99223 1ST HOSP IP/OBS HIGH 75: CPT | Performed by: PSYCHIATRY & NEUROLOGY

## 2021-08-30 PROCEDURE — 6370000000 HC RX 637 (ALT 250 FOR IP): Performed by: PSYCHIATRY & NEUROLOGY

## 2021-08-30 RX ORDER — PALIPERIDONE 3 MG/1
3 TABLET, EXTENDED RELEASE ORAL DAILY
Status: DISCONTINUED | OUTPATIENT
Start: 2021-08-30 | End: 2021-08-31

## 2021-08-30 RX ADMIN — TRAZODONE HYDROCHLORIDE 50 MG: 50 TABLET ORAL at 20:34

## 2021-08-30 RX ADMIN — ACETAMINOPHEN 650 MG: 325 TABLET, FILM COATED ORAL at 20:34

## 2021-08-30 RX ADMIN — HYDROXYZINE HYDROCHLORIDE 50 MG: 50 TABLET, FILM COATED ORAL at 20:34

## 2021-08-30 RX ADMIN — PALIPERIDONE 3 MG: 3 TABLET, EXTENDED RELEASE ORAL at 18:19

## 2021-08-30 ASSESSMENT — PAIN DESCRIPTION - ORIENTATION
ORIENTATION: RIGHT;LEFT
ORIENTATION: RIGHT;LEFT

## 2021-08-30 ASSESSMENT — PAIN SCALES - GENERAL
PAINLEVEL_OUTOF10: 8
PAINLEVEL_OUTOF10: 0
PAINLEVEL_OUTOF10: 8
PAINLEVEL_OUTOF10: 10

## 2021-08-30 ASSESSMENT — PAIN DESCRIPTION - LOCATION
LOCATION: EYE
LOCATION: EYE

## 2021-08-30 NOTE — BH NOTE
[facility]  Admission Note     Admission Type:   Admission Type: Voluntary    Reason for admission:  Reason for Admission: suicidal ideations no plan; homicidal ideations to person killing her cats    PATIENT STRENGTHS:  Strengths: No significant Physical Illness, Communication    Patient Strengths and Limitations:  Limitations: General negative or hopeless attitude about future/recovery, Hopeless about future    Addictive Behavior:   Addictive Behavior  In the past 3 months, have you felt or has someone told you that you have a problem with:  : None  Do you have a history of Chemical Use?: No  Do you have a history of Alcohol Use?: No  Do you have a history of Street Drug Abuse?: No  Histroy of Prescripton Drug Abuse?: No    Medical Problems:   Past Medical History:   Diagnosis Date    Agoraphobia     Anxiety     [facility]    Depression     Diarrhea     Drug abuse, marijuana     Dysmenorrhea     Endometriosis 1/23/2013    new dx    Fibroid     GERD (gastroesophageal reflux disease)     History of nipple discharge     Hyperlipidemia     Hypertension     no longer on meds-anxiety induced HTN    Hypothyroidism     Irritable bowel syndrome     LGSIL (low grade squamous intraepithelial dysplasia) 10/11    Menorrhagia     Midline low back pain without sciatica 1/26/2016    Mood swings     Ovarian cyst     Pelvic adhesions     Pelvic pain     Prolactin increased 2/12, 11/12    Rectal bleeding     Type II or unspecified type diabetes mellitus without mention of complication, not stated as uncontrolled     Dr. Micha Marcus Vocal cord polyps     Vomiting     Wellness examination     Dr. Tarsha Goins last seen 5/13/2020       Status EXAM:  Status and Exam  Normal: No  Facial Expression: Flat, Avoids Gaze  Affect: Blunt  Level of Consciousness: Alert  Mood:Normal: No  Mood: Depressed  Motor Activity:Normal: Yes  Interview Behavior: Cooperative  Preception: [place] to Person, Danielle Eleazar to Time, [place] to Place, [place] to Situation  Attention:Normal: No  Attention: Distractible  Thought Processes: Circumstantial  Thought Content:Normal: No  Thought Content: Preoccupations  Hallucinations: None  Delusions: No  Memory:Normal: No  Memory: Poor Recent  Insight and Judgment: No  Insight and Judgment: Poor Judgment, Poor Insight  Present Suicidal Ideation: No  Present Homicidal Ideation: No    Tobacco Screening:  Practical Counseling, on admission, joanna X, if applicable and completed (first 3 are required if patient doesn't refuse):            ( )  Recognizing danger situations (included triggers and roadblocks)                    ( )  Coping skills (new ways to manage stress, exercise, relaxation techniques, changing routine, distraction)                                                           ( )  Basic information about quitting (benefits of quitting, techniques in how to quit, available resources  ( ) Referral for counseling faxed to [facility]                                           ( x) Patient refused counseling  ( ) Patient has not smoked in the last 30 days    Metabolic Screening:    Lab Results   Component Value Date    LABA1C 6.0 02/11/2021       Lab Results   Component Value Date    CHOL 172 01/15/2020    CHOL 119 05/19/2016    CHOL 121 05/15/2015    CHOL 143 08/01/2013    CHOL 125 10/22/2012    CHOL 190 08/22/2012    CHOL 179 04/04/2012    CHOL 136 01/19/2012     Lab Results   Component Value Date    TRIG 90 01/15/2020    TRIG 63 05/19/2016    TRIG 196 (H) 05/15/2015    TRIG 154 (H) 08/01/2013    TRIG 500 (H) 10/22/2012    TRIG 455 (H) 08/22/2012    TRIG 342 (H) 04/04/2012    TRIG 154 (H) 01/19/2012     Lab Results   Component Value Date    HDL 56 01/15/2020    HDL 54 05/19/2016    HDL 30 (L) 05/15/2015    HDL 29 (L) 08/01/2013    HDL 34 (L) 10/22/2012    HDL 38 (L) 08/22/2012    HDL 34 (L) 04/04/2012    HDL 42 01/19/2012     No components found for: LDLCAL  No results found for: LABVLDL      Body mass index is 29.63 kg/m². BP Readings from Last 2 Encounters:   08/29/21 108/76   02/09/21 (!) 130/59           Pt admitted with followings belongings:  Dentures: None  Vision - Corrective Lenses: None  Hearing Aid: None  Jewelry: Other (Comment) (Lower lip piercing.)  Body Piercings Removed: No (Lower lip piercing-pt kept.)  Clothing: Footwear, Pants, Shirt, Undergarments (Comment), Socks  Were All Patient Medications Collected?: Not Applicable  Other Valuables: Cell phone, Other (Comment) ([employer] ID card, lighter, cigarettes)     Patient's home medications need verified. Patient oriented to surroundings and program expectations and copy of patient rights given. Received admission packet:  yes. Consents reviewed, signed yes. Refused no. Patient verbalize understanding:  yes. Patient education on precautions: yes              Patient admitted from [facility] for suicidal ideations without a plan and homicidal ideations towards whomever is poisoning her cats. Patient reports she has been off her medications for over a year. Patient reports poor sleep and states she has not slept in days. Patient reports daily marijuana use. Patient changed into hospital clothes and wanded for safety.          Vicki Ascencio RN

## 2021-08-30 NOTE — PROGRESS NOTES
Behavioral Services  Medicare Certification Upon Admission    I certify that this patient's inpatient psychiatric hospital admission is medically necessary for:    [x] (1) Treatment which could reasonably be expected to improve this patient's condition,       [x] (2) Or for diagnostic study;     AND     [x](2) The inpatient psychiatric services are provided while the individual is under the care of a physician and are included in the individualized plan of care.     Estimated length of stay/service -3 to 6 days    Plan for post-hospital care -outpatient care    Electronically signed by Suzanne Wells MD on 8/30/2021 at 5:22 PM

## 2021-08-30 NOTE — GROUP NOTE
Group Therapy Note    Date: 8/30/2021    Group Start Time: 0900  Group End Time: 8974  Group Topic: Community Meeting    [facility]    Patient refused to attend community meeting and goal setting group at 0900 after encouragement from staff. 1:1 talk time offered by staff as alternative to group session.

## 2021-08-30 NOTE — GROUP NOTE
Group Therapy Note    Date: 8/30/2021    Group Start Time: 1100  Group End Time: 5229  Group Topic: Cognitive Skills    CORTNEY Figueroa        Group Therapy Note    Attendees: 12/23      Patient's Goal:  To improve decision making skills     Notes:   Pt was pleasant and participated well     Status After Intervention:  unchanged    Participation Level:  Active Listener     Participation quality minimal     Speech:  normal      Thought Process/Content: slow to process      Affective Functioning: flat      Mood: depressed      Level of consciousness:  Alert       Response to Learning: =Progressing to goal      Endings: None Reported    Modes of Intervention: Education and Problem-solving      Discipline Responsible: Psychoeducational Specialist      Signature:  Emerson Bolden

## 2021-08-30 NOTE — PLAN OF CARE
[facility]  Initial Interdisciplinary Treatment Plan NO      Original treatment plan Date & Time: 8/30/2021  0758    Admission Type:  Admission Type: Voluntary    Reason for admission:   Reason for Admission: suicidal ideations no plan; homicidal ideations to person killing her cats    Estimated Length of Stay:  5-7days  Estimated Discharge Date: to be determined by physician    PATIENT STRENGTHS:  Patient Strengths:Strengths: No significant Physical Illness, Communication  Patient Strengths and Limitations:Limitations: General negative or hopeless attitude about future/recovery, Hopeless about future  Addictive Behavior: Addictive Behavior  In the past 3 months, have you felt or has someone told you that you have a problem with:  : None  Do you have a history of Chemical Use?: No  Do you have a history of Alcohol Use?: No  Do you have a history of Street Drug Abuse?: No  Histroy of Prescripton Drug Abuse?: No  Medical Problems:  Past Medical History:   Diagnosis Date    Agoraphobia     Anxiety     sees Dr. Alanna Ruvalcaba at [facility]    Depression     Diarrhea     Drug abuse, marijuana     Dysmenorrhea     Endometriosis 1/23/2013    new dx    Fibroid     GERD (gastroesophageal reflux disease)     History of nipple discharge     Hyperlipidemia     Hypertension     no longer on meds-anxiety induced HTN    Hypothyroidism     Irritable bowel syndrome     LGSIL (low grade squamous intraepithelial dysplasia) 10/11    Menorrhagia     Midline low back pain without sciatica 1/26/2016    Mood swings     Ovarian cyst     Pelvic adhesions     Pelvic pain     Prolactin increased 2/12, 11/12    Rectal bleeding     Type II or unspecified type diabetes mellitus without mention of complication, not stated as uncontrolled     Dr. Ambika Thurman    Vocal cord polyps     Vomiting     Wellness examination     Dr. Richar Del Toro last seen 5/13/2020     Status EXAM:Status and Exam  Normal: No  Facial Expression: Flat, Avoids Gaze  Affect: Blunt  Level of Consciousness: Alert  Mood:Normal: No  Mood: Depressed  Motor Activity:Normal: Yes  Interview Behavior: Cooperative  Preception: Cobb to Person, Johnita Grebe to Time, Cobb to Place, Cobb to Situation  Attention:Normal: No  Attention: Distractible  Thought Processes: Circumstantial  Thought Content:Normal: No  Thought Content: Preoccupations  Hallucinations: None  Delusions: No  Memory:Normal: No  Memory: Poor Recent  Insight and Judgment: No  Insight and Judgment: Poor Judgment, Poor Insight  Present Suicidal Ideation: No  Present Homicidal Ideation: No    EDUCATION:   Learner Progress Toward Treatment Goals: reviewed group plans and strategies for care    Method:group therapy, medication compliance, individualized assessments and care planning    Outcome: needs reinforcement    PATIENT GOALS: to be discussed with patient within 72 hours    PLAN/TREATMENT RECOMMENDATIONS:     continue group therapy , medications compliance, goal setting, individualized assessments and care, continue to monitor pt on unit      SHORT-TERM GOALS:   Time frame for Short-Term Goals: 5-7 days    LONG-TERM GOALS:  Time frame for Long-Term Goals: 6 months  Members Present in Team Meeting: See Signature Sheet    JAMIE Ray

## 2021-08-30 NOTE — PLAN OF CARE
Problem: Altered Mood, Depressive Behavior:  Goal: Able to verbalize and/or display a decrease in depressive symptoms  Description: Able to verbalize and/or display a decrease in depressive symptoms  8/30/2021 0940 by Gregoria Zeng LPN  Outcome: Ongoing  Note: Patient is able to verbalize decrease in depression symptoms. Patient has been free of self harm. Q15 minute safety checks continue. Problem: Altered Mood, Depressive Behavior:  Goal: Absence of self-harm  Description: Absence of self-harm  8/30/2021 0940 by Gregoria Zeng LPN  Outcome: Ongoing  Note: Patient denies suicidal ideation. 15 minute safety checks continued for patient safety.

## 2021-08-30 NOTE — GROUP NOTE
Group Therapy Note    Date: 8/30/2021    Group Start Time: 2651  Group End Time: 1500  Group Topic: Psychoeducation    [facility]    Beronica Alvarez, CTRS    Pt did not attend [place] [id number] psychoeducation  group d/t resting in room despite staff invitation to attend. 1:1 talk time offered as alternative to group session, pt declined.             Signature:  Tamanna Conte

## 2021-08-30 NOTE — PROGRESS NOTES
Pharmacy Medication History Note      List of current medications patient is taking is complete. Source of information: [employer], Lizzy Hoskins    Changes made to medication list:  Medications removed (include reason, ex. therapy complete or physician discontinued, noncompliance):  Acetaminophen (list clean up), Albuterol nebulizer solution (list clean up), Dicyclomine (therapy completed), Hydrocortisone (therapy completed), Ibuprofen (list clean up), Melatonin (list clean up), Metformin (list clean up), Duplicate Pantoprazole    Medications added/doses adjusted:  Adjusted Divalproex to  mg twice daily    Other notes (ex. Recent course of antibiotics, Coumadin dosing): The patient has not filled Buspirone, Divalproex, or Risperidone since December 2020. The remaining medications have been filled in the last three months. Please let me know if you have any questions about this encounter. Thank you!     Electronically signed by Shailesh Gay [facility] on 8/30/2021 at 9:52 AM
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
## 2021-08-31 PROCEDURE — 99233 SBSQ HOSP IP/OBS HIGH 50: CPT | Performed by: PSYCHIATRY & NEUROLOGY

## 2021-08-31 PROCEDURE — 6370000000 HC RX 637 (ALT 250 FOR IP): Performed by: PSYCHIATRY & NEUROLOGY

## 2021-08-31 PROCEDURE — 1240000000 HC EMOTIONAL WELLNESS R&B

## 2021-08-31 PROCEDURE — APPSS30 APP SPLIT SHARED TIME 16-30 MINUTES

## 2021-08-31 PROCEDURE — 6360000002 HC RX W HCPCS: Performed by: PSYCHIATRY & NEUROLOGY

## 2021-08-31 PROCEDURE — 99254 IP/OBS CNSLTJ NEW/EST MOD 60: CPT | Performed by: INTERNAL MEDICINE

## 2021-08-31 RX ORDER — ALBUTEROL SULFATE 90 UG/1
2 AEROSOL, METERED RESPIRATORY (INHALATION) EVERY 4 HOURS PRN
Status: DISCONTINUED | OUTPATIENT
Start: 2021-08-31 | End: 2021-09-02 | Stop reason: HOSPADM

## 2021-08-31 RX ORDER — PANTOPRAZOLE SODIUM 40 MG/1
40 TABLET, DELAYED RELEASE ORAL
Status: DISCONTINUED | OUTPATIENT
Start: 2021-09-01 | End: 2021-09-02 | Stop reason: HOSPADM

## 2021-08-31 RX ORDER — BUDESONIDE AND FORMOTEROL FUMARATE DIHYDRATE 80; 4.5 UG/1; UG/1
2 AEROSOL RESPIRATORY (INHALATION) 2 TIMES DAILY
Status: DISCONTINUED | OUTPATIENT
Start: 2021-08-31 | End: 2021-09-02 | Stop reason: HOSPADM

## 2021-08-31 RX ORDER — PALIPERIDONE 6 MG/1
6 TABLET, EXTENDED RELEASE ORAL DAILY
Status: DISCONTINUED | OUTPATIENT
Start: 2021-09-01 | End: 2021-09-02 | Stop reason: HOSPADM

## 2021-08-31 RX ADMIN — PALIPERIDONE 3 MG: 3 TABLET, EXTENDED RELEASE ORAL at 07:54

## 2021-08-31 RX ADMIN — TRAZODONE HYDROCHLORIDE 50 MG: 50 TABLET ORAL at 20:40

## 2021-08-31 RX ADMIN — HYDROXYZINE HYDROCHLORIDE 50 MG: 50 TABLET, FILM COATED ORAL at 20:40

## 2021-08-31 RX ADMIN — HALOPERIDOL LACTATE 5 MG: 5 INJECTION, SOLUTION INTRAMUSCULAR at 13:12

## 2021-08-31 RX ADMIN — LORAZEPAM 2 MG: 2 INJECTION INTRAMUSCULAR; INTRAVENOUS at 13:13

## 2021-08-31 RX ADMIN — DIPHENHYDRAMINE HYDROCHLORIDE 50 MG: 50 INJECTION, SOLUTION INTRAMUSCULAR; INTRAVENOUS at 13:12

## 2021-08-31 RX ADMIN — HYDROXYZINE HYDROCHLORIDE 50 MG: 50 TABLET, FILM COATED ORAL at 07:54

## 2021-08-31 ASSESSMENT — PAIN SCALES - GENERAL: PAINLEVEL_OUTOF10: 0

## 2021-08-31 NOTE — CONSULTS
[facility]    CONSULTATION    / FOLLOW UP VISIT       Date:   8/31/2021  Patient name:  L[place] Tread[place]  Date of admission:  8/29/2021  2:40 PM  MRN:   [id number]  Account:  [de-identified]  YOB: 1980  PCP:    Anu Christensen DO  Room:   [facility]0219-01  Code Status:    Full Code    Physician Requesting Consult: Bev Grey MD    History of Present Illness:      C/C ;  Medical comorbidity management     REASON FOR CONSULT;  Medical comorbidity and medication management ;                                                 *Principal Problem:    Bipolar disorder with psychotic features ([facility] [address]  Active Problems:    Hypothyroidism    DM (diabetes mellitus) ([facility] [address]    Hypercholesteremia    Cocaine use  Resolved Problems:    * No resolved hospital problems. *           HPI;    Patient admitted to [facility] with bipolar disorder with psychotic features   We have been consulted to evaluate her medical conditions including  Hypothyroidism    Diabetes mellitus  Hyperlipidemia    Patient does have a history of cocaine use              Past and Surgical hx as in H and P  Social History:     Tobacco:    reports that she has been smoking cigarettes. She has a 15.00 pack-year smoking history. She has never used smokeless tobacco.  Alcohol:      reports no history of alcohol use. Drug Use:  reports current drug use. Drug: Marijuana.     Review of Systems:     POSITIVE AND NEGATIVES AS DESCRIBED IN HISTORY OF PRESENT ILLNESS ;  IN ADDITION ;  Review of Systems          All other systems negative                Physical Exam:     Physical Exam   Vitals:    08/29/21 1946 08/30/21 0826 08/30/21 2015 08/31/21 0800   BP: 108/76 99/65 108/70 (!) 111/51   Pulse: 90 57 64 50   Resp: 16 14 16 14   Temp: 98 °F (36.7 °C) 98.6 °F (37 °C)  98.1 °F (36.7 °C)   TempSrc: Oral Oral  Oral   SpO2:       Weight: 162 lb (73.5 kg)      Height: 5' 2\" (1.575 m)                      Body mass index is 29.63 kg/m². General Appearance:   -, CO-OPERATIVE ,                                                        Pulmonary/Chest:        Clear to auscultation bilaterally . No wheezes, rales or rhonchi . Cardiovascular:            Normal rate, regular rhythm,                                          No murmur or  Gallop . Abdomen:                       Soft, non-tender                                                                                    Extremities:                    No Edema . Neuromuskuloskeletal    ... Data:     URINE ANALYSIS: No results found for: LABURIN     CBC:  Lab Results   Component Value Date    WBC 3.7 08/29/2021    HGB 14.3 08/29/2021     08/29/2021     04/04/2012        BMP:    Lab Results   Component Value Date     08/29/2021    K 3.6 08/29/2021     08/29/2021    CO2 26 08/29/2021    BUN 10 08/29/2021    CREATININE 1.06 08/29/2021    GLUCOSE 139 08/29/2021    GLUCOSE 147 04/04/2012      LIVER PROFILE:  Lab Results   Component Value Date    ALT 26 08/29/2021    AST 24 08/29/2021    PROT 6.6 08/29/2021    BILITOT 0.16 08/29/2021    BILIDIR <0.08 05/19/2016    LABALBU 4.1 08/29/2021    LABALBU 4.1 04/04/2012                 Medications: Allergies:     Allergies   Allergen Reactions    Codeine     Chantix [Varenicline Tartrate] Rash    Magnesium Citrate Nausea And Vomiting       Current Meds:   Scheduled Meds:    [START ON 9/1/2021] pantoprazole  40 mg Oral Daily with breakfast    budesonide-formoterol  2 puff Inhalation BID    [START ON 9/1/2021] paliperidone  6 mg Oral Daily    potassium chloride  40 mEq Oral Once    nicotine  1 patch Transdermal Daily     Continuous Infusions:   PRN Meds: albuterol sulfate HFA, acetaminophen, aluminum & magnesium hydroxide-simethicone, hydrOXYzine, ibuprofen, polyethylene glycol, traZODone, haloperidol lactate **AND** LORazepam **AND** diphenhydrAMINE, haloperidol **AND** LORazepam        Assessment :       Assessment Dx  Principal Problem:    Bipolar disorder with psychotic features ([facility] [address]  Active Problems:    Hypothyroidism    DM (diabetes mellitus) ([facility] [address]    Hypercholesteremia    Cocaine use  Resolved Problems:    * No resolved hospital problems. *       On symbicort       Plan:     In view of history of diabetes hypertension hypothyroidism hyperlipidemia   We will recheck hemoglobin A1c CMP CBC thyroid P and decide about further treatment plans   Her blood pressure and vitals     Thanks for consulting us . Will monitor vitals and clinical course , and  Optimize therapy  as needed . Gabriella Clarke MD    Copy sent to Dr. Dinorah Lyle, DO    Pleasenote that this chart was generated using voice recognition Dragon dictation software. Although every effort was made to ensure the accuracy of this automated transcription, some errors in transcription may have occurred.

## 2021-08-31 NOTE — PLAN OF CARE
Problem: Altered Mood, Depressive Behavior:  Goal: Able to verbalize and/or display a decrease in depressive symptoms  Description: Able to verbalize and/or display a decrease in depressive symptoms  8/31/2021 1206 by Magui Christina RN  Outcome: Ongoing  Note: Patient appears depressed as evidenced by a flat affect, guarded, withdrawn, isolative, and out for meals and needs only. 8/31/2021 0519 by New Johnson RN  Outcome: Ongoing  Goal: Ability to disclose and discuss suicidal ideas will improve  Description: Ability to disclose and discuss suicidal ideas will improve  8/31/2021 1206 by Magui Christina RN  Outcome: Ongoing  Note: Pt denies thoughts of self harm and is agreeable to seeking out should thoughts of self harm arise. Safe environment maintained. Q15 minute checks for safety cont per unit policy. Will cont to monitor for safety and provides support and reassurance as needed. 8/31/2021 0519 by New Johnson RN  Outcome: Ongoing  Goal: Absence of self-harm  Description: Absence of self-harm  8/31/2021 1206 by Magui Christina RN  Outcome: Ongoing  Note: Pt is currently not attempting to inflict self harm. 8/31/2021 0519 by New Johnson RN  Outcome: Ongoing     Problem: Tobacco Use:  Goal: Inpatient tobacco use cessation counseling participation  Description: Inpatient tobacco use cessation counseling participation  Outcome: Ongoing  Note: Patient given tobacco quitline number 1-579-935-133-099-9306 at this time. With nurse observation patient called number for information and follow up. Continue to reinforce the dangers of long term tobacco use and why tobacco cessation is important to patient. Problem: Pain:  Goal: Pain level will decrease  Description: Pain level will decrease  Outcome: Ongoing  Note: Patient is currently denies having any pain and is not requesting any pain relief medication.   Goal: Control of acute pain  Description: Control of acute pain  Outcome: Ongoing  Goal: Control of chronic pain  Description: Control of chronic pain  Outcome: Ongoing     Problem: Altered Mood, Depressive Behavior:  Intervention: Assess psychological signs and symptoms of depression  Note: Patient appears depressed as evidenced by a flat affect, guarded, withdrawn, isolative, and out for meals and needs only. Intervention: Assess behavior for possible injury to self  Note: Pt is currently not attempting to inflict self harm. Intervention: Manage a safe environment  Note: Patient's room is within view of the nurse's station. Problem: Tobacco Use:  Intervention: Tobacco-use cessation counseling  Note: Patient is receiving the 14mg transdermal nicotine patch for smoking cessation.
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
## 2021-08-31 NOTE — GROUP NOTE
Group Therapy Note    Date: 8/31/2021    Group Start Time: 1100  Group End Time: 1150  Group Topic: Cognitive Skills    MENA Lemon, CTRS        Group Therapy Note    Attendees: 4/20         Pt did not participate in Cognitive Skills Group at 1100am when encouraged by RT due to resting in room. Pt was offered talk time as an alternative to group but declined.          Discipline Responsible: Psychoeducational Specialist        Signature:  Aminah Hightower Pt presents for evaluation of generalized abdominal pain over the last 10 days. Had an episode of emesis. Last BM today. Was seen by PCP, given meds for constipation.
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
## 2021-08-31 NOTE — BH NOTE
Patient is complaining of feeling anxious and requested their 50mg oral of Atarax per orders. Patient is currently sitting in the day area, and showing no signs of distress. Safety checks are maintained every 15 minutes and at irregular intervals.

## 2021-08-31 NOTE — BH NOTE
Post Restraint and Seclusion Patient Debriefing    Did debriefing occur? yes, patient was compliant with answering the questions    If No, why not? [] Patient refused  [] Patient is unavailable for debriefing due to:  [] Patient debriefing not completed due to clinical contraindication of:    What events led to the seclusion/restraint incident? Patient was on the telephone and got told the [place] was coming to get her animals  Did being restrained or in seclusion help you regain control of your behavior? yes, it forced me to be calm      Did you feel safe while you were in restraint or seclusion:      [] Very Safe  [] Safe  [] Somewhat Safe  [x] Not Safe     Did you have the chance to gain control of your behavior before you were secluded or restrained? Yes  If Yes, how:    [x] Offered Medication  [x] Talked with  [] Given Time Out   Reoriented to appropriate behavior in the hospital   [x] Offered alternatives to restraint/seclusion     During the restraint or seclusion process were you offered medicine to help you gain control? yes      Were your physical and emotional needs met, and your privacy rights addressed while you were in restraints/seclusion? yes, my privacy was maintained      How can we assist you in remaining restraint or seclusion free in the future? Patient was unable to answer  Is there anything else you would like to share regarding this restraint/seclusion episode? No    Patient consented to family or significant other to participate in debriefing No    Patient's guardian participated in debriefing N/A    Patient's guardian unable to participate in debriefing N/A  Staff: Were modifications to the treatment plan completed?  Yes

## 2021-08-31 NOTE — PLAN OF CARE
Problem: Altered Mood, Depressive Behavior:  Goal: Able to verbalize and/or display a decrease in depressive symptoms  Description: Able to verbalize and/or display a decrease in depressive symptoms  Outcome: Ongoing  Goal: Ability to disclose and discuss suicidal ideas will improve  Description: Ability to disclose and discuss suicidal ideas will improve  Outcome: Ongoing  Goal: Absence of self-harm  Description: Absence of self-harm  Outcome: Ongoing   Patient remains isolative in bed most of shift. She denies suicidal ideations. She does not display any unsafe of hostile behavior, denies hallucinations. Patient is accepting of prn pain and sleep medications.

## 2021-08-31 NOTE — GROUP NOTE
Group Therapy Note    Date: 8/31/2021    Group Start Time: 1000  Group End Time: 3456  Group Topic: Psychotherapy    MENA Pope        Group Therapy Note           Patient refused to attend psychotherapy group after encouragement from staff. 1:1 talk time offered but refused. Signature:   Himanshu Pope
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
## 2021-08-31 NOTE — BH NOTE
Debriefing:    Writer explained the criteria for debriefing and patient was accepting. Patient is currently using the telephone in the day area and maintaining her control. Safety checks are maintained every 15 minutes and at irregular intervals.

## 2021-08-31 NOTE — PLAN OF CARE
[address] [place] [address]  Day 3 Interdisciplinary Treatment Plan NOTE    Review Date & Time: 8/31/2021                     1300    Admission Type:   Admission Type: Involuntary    Reason for admission:  Reason for Admission: SI no plan  Estimated Length of Stay: 5-7 days  Estimated Discharge Date Update: to be determined by physician    PATIENT STRENGTHS:  Patient Strengths Strengths: Positive Support, No significant Physical Illness  Patient Strengths and Limitations:Limitations: Tendency to isolate self, Lacks leisure interests, Difficulty problem solving/relies on others to help solve problems, Hopeless about future, Multiple barriers to leisure interests, Inappropriate/potentially harmful leisure interests (depression substance abuse anxiety poor coping skills)  Addictive Behavior:Addictive Behavior  In the past 3 months, have you felt or has someone told you that you have a problem with:  : None  Do you have a history of Chemical Use?: No  Do you have a history of Alcohol Use?: No  Do you have a history of Street Drug Abuse?: Yes  Histroy of Prescripton Drug Abuse?: No  Medical Problems:No past medical history on file.     Risk:  Fall RiskTotal: 53  Yemi Scale Yemi Scale Score: 22  BVC Total: 0  Change in scores no Changes to plan of Care no    Status EXAM:   Status and Exam  Normal: No  Facial Expression: Elevated  Affect: Inappropriate  Level of Consciousness: Alert  Mood:Normal: No  Mood: Depressed, Anxious  Motor Activity:Normal: No  Motor Activity: Decreased  Interview Behavior: Cooperative  Preception: [place] to Person, Cynda Carrier to Time, [place] to Place, [place] to Situation  Attention:Normal: Yes  Attention: Distractible  Thought Processes: Circumstantial  Thought Content:Normal: Yes  Thought Content: Preoccupations  Hallucinations: None  Delusions: No  Memory:Normal: Yes  Memory: Poor Recent, Confabulation  Insight and Judgment: No  Insight and Judgment: Unmotivated  Present Suicidal Ideation: No  Present Homicidal Ideation: No    Daily Assessment Last Entry:   Daily Sleep (WDL): Within Defined Limits         Patient Currently in Pain: No  Daily Nutrition (WDL): Within Defined Limits    Patient Monitoring:  Frequency of Checks: 4 times per hour, close    Psychiatric Symptoms:   Depression Symptoms  Depression Symptoms: Isolative, Loss of interest  Anxiety Symptoms  Anxiety Symptoms: Generalized  Aretha Symptoms  Aretha Symptoms: No problems reported or observed. Psychosis Symptoms  Delusion Type: No problems reported or observed. Suicide Risk CSSR-S:  Have you wished you were dead or wished you could go to sleep and not wake up? : NO  Have you actually had any thoughts of killing yourself? : NO  Have you ever done anything, started to do anything, or prepared to do anything to end your life?: NO  Change in Result                   no                   Change in Plan of care                    no      EDUCATION:   EDUCATION:   Learner Progress Toward Treatment Goals: Reviewed results and recommendations of this team, Reviewed group plan and strategies, Reviewed signs, symptoms and risk of self harm and violent behavior, Reviewed goals and plan of care    Method:small group, individual verbal education    Outcome:verbalized by patient, but needs reinforcement to obtain goals    PATIENT GOALS:  Short term: Pt is agitated, threatening to staff, throwing things.  Pt unable to attend Team Meeting, pt did not develop a short term goal.  Long term:Pt did not develop a long term goal.    PLAN/TREATMENT RECOMMENDATIONS UPDATE: continue with group therapies, increased socialization, continue planning for after discharge goals, continue with medication compliance    SHORT-TERM GOALS UPDATE:   Time frame for Short-Term Goals: 5-7 days    LONG-TERM GOALS UPDATE:   Time frame for Long-Term Goals: 6 months  Members Present in Team Meeting: See Signature Sheet    Josefa Royal

## 2021-08-31 NOTE — BH NOTE
Medications verified with [facility] and nurse practitioner notified via [employer].

## 2021-08-31 NOTE — GROUP NOTE
Group Therapy Note    Date: 8/31/2021    Group Start Time: 1430  Group End Time: 8142  Group Topic: Cognitive Skills    MENA Mckeon Ra, CTRS        Group Therapy Note    Attendees:   4/16         Pt did not participate in Cognitive Skills Group at 1430 when encouraged by RT due to resting in room. Pt was offered talk time as an alternative to group but declined.          Discipline Responsible: Psychoeducational Specialist        Signature:  Parker Stone

## 2021-09-01 LAB
ALBUMIN SERPL-MCNC: 3.6 G/DL (ref 3.5–5.2)
ALBUMIN/GLOBULIN RATIO: ABNORMAL (ref 1–2.5)
ALP BLD-CCNC: 82 U/L (ref 35–104)
ALT SERPL-CCNC: 18 U/L (ref 5–33)
ANION GAP SERPL CALCULATED.3IONS-SCNC: 7 MMOL/L (ref 9–17)
AST SERPL-CCNC: 26 U/L
BILIRUB SERPL-MCNC: 0.17 MG/DL (ref 0.3–1.2)
BILIRUBIN DIRECT: <0.08 MG/DL
BILIRUBIN, INDIRECT: ABNORMAL MG/DL (ref 0–1)
BUN BLDV-MCNC: 14 MG/DL (ref 6–20)
CALCIUM SERPL-MCNC: 9.7 MG/DL (ref 8.6–10.4)
CHLORIDE BLD-SCNC: 102 MMOL/L (ref 98–107)
CHOLESTEROL/HDL RATIO: 4
CHOLESTEROL: 148 MG/DL
CO2: 26 MMOL/L (ref 20–31)
CREAT SERPL-MCNC: 1.02 MG/DL (ref 0.5–0.9)
ESTIMATED AVERAGE GLUCOSE: 131 MG/DL
GFR AFRICAN AMERICAN: >60 ML/MIN
GFR NON-AFRICAN AMERICAN: 59 ML/MIN
GFR SERPL CREATININE-BSD FRML MDRD: ABNORMAL ML/MIN/{1.73_M2}
GFR SERPL CREATININE-BSD FRML MDRD: ABNORMAL ML/MIN/{1.73_M2}
GLUCOSE BLD-MCNC: 121 MG/DL (ref 70–99)
HBA1C MFR BLD: 6.2 % (ref 4–6)
HCT VFR BLD CALC: 41 % (ref 36–46)
HDLC SERPL-MCNC: 37 MG/DL
HEMOGLOBIN: 13.5 G/DL (ref 12–16)
LDL CHOLESTEROL: 78 MG/DL (ref 0–130)
MCH RBC QN AUTO: 29.6 PG (ref 26–34)
MCHC RBC AUTO-ENTMCNC: 33 G/DL (ref 31–37)
MCV RBC AUTO: 89.7 FL (ref 80–100)
NRBC AUTOMATED: NORMAL PER 100 WBC
PDW BLD-RTO: 14 % (ref 11.5–14.9)
PLATELET # BLD: 193 K/UL (ref 150–450)
PMV BLD AUTO: 8.8 FL (ref 6–12)
POTASSIUM SERPL-SCNC: 4.4 MMOL/L (ref 3.7–5.3)
RBC # BLD: 4.57 M/UL (ref 4–5.2)
SODIUM BLD-SCNC: 135 MMOL/L (ref 135–144)
TOTAL PROTEIN: 6.2 G/DL (ref 6.4–8.3)
TRIGL SERPL-MCNC: 164 MG/DL
TSH SERPL DL<=0.05 MIU/L-ACNC: 2.85 MIU/L (ref 0.3–5)
VLDLC SERPL CALC-MCNC: ABNORMAL MG/DL (ref 1–30)
WBC # BLD: 4.8 K/UL (ref 3.5–11)

## 2021-09-01 PROCEDURE — 82248 BILIRUBIN DIRECT: CPT

## 2021-09-01 PROCEDURE — 6370000000 HC RX 637 (ALT 250 FOR IP): Performed by: PSYCHIATRY & NEUROLOGY

## 2021-09-01 PROCEDURE — 83036 HEMOGLOBIN GLYCOSYLATED A1C: CPT

## 2021-09-01 PROCEDURE — 84443 ASSAY THYROID STIM HORMONE: CPT

## 2021-09-01 PROCEDURE — 85027 COMPLETE CBC AUTOMATED: CPT

## 2021-09-01 PROCEDURE — 80061 LIPID PANEL: CPT

## 2021-09-01 PROCEDURE — 99232 SBSQ HOSP IP/OBS MODERATE 35: CPT | Performed by: PSYCHIATRY & NEUROLOGY

## 2021-09-01 PROCEDURE — 80053 COMPREHEN METABOLIC PANEL: CPT

## 2021-09-01 PROCEDURE — APPSS30 APP SPLIT SHARED TIME 16-30 MINUTES

## 2021-09-01 PROCEDURE — 99232 SBSQ HOSP IP/OBS MODERATE 35: CPT | Performed by: INTERNAL MEDICINE

## 2021-09-01 PROCEDURE — 6370000000 HC RX 637 (ALT 250 FOR IP)

## 2021-09-01 PROCEDURE — 36415 COLL VENOUS BLD VENIPUNCTURE: CPT

## 2021-09-01 PROCEDURE — 1240000000 HC EMOTIONAL WELLNESS R&B

## 2021-09-01 RX ADMIN — PALIPERIDONE 6 MG: 6 TABLET, EXTENDED RELEASE ORAL at 10:10

## 2021-09-01 RX ADMIN — HYDROXYZINE HYDROCHLORIDE 50 MG: 50 TABLET, FILM COATED ORAL at 20:34

## 2021-09-01 RX ADMIN — PANTOPRAZOLE SODIUM 40 MG: 40 TABLET, DELAYED RELEASE ORAL at 10:10

## 2021-09-01 RX ADMIN — HYDROXYZINE HYDROCHLORIDE 50 MG: 50 TABLET, FILM COATED ORAL at 10:12

## 2021-09-01 RX ADMIN — HYDROXYZINE HYDROCHLORIDE 50 MG: 50 TABLET, FILM COATED ORAL at 17:31

## 2021-09-01 RX ADMIN — TRAZODONE HYDROCHLORIDE 50 MG: 50 TABLET ORAL at 20:34

## 2021-09-01 NOTE — CONSULTS
[facility]    CONSULTATION    / FOLLOW UP VISIT       Date:   9/1/2021  Patient name:  Job Rosario  Date of admission:  8/29/2021  2:40 PM  MRN:   [id number]  Account:  [de-identified]  YOB: 1980  PCP:    Khang Vila DO  [facility]   [facility]  Code Status:    Full Code    Physician Requesting Consult: Jevon Jeronimo MD    History of Present Illness:      C/C ;  Medical comorbidity management     REASON FOR CONSULT;  Medical comorbidity and medication management ;                                                 *Principal Problem:    Bipolar disorder with psychotic features ([facility] [address]  Active Problems:    Hypothyroidism    DM (diabetes mellitus) ([facility] [address]    Hypercholesteremia    Cocaine use    Hx of diabetes mellitus  Resolved Problems:    * No resolved hospital problems. *           HPI;    Patient admitted to [facility] with bipolar disorder with psychotic features   We have been consulted to evaluate her medical conditions including  Hypothyroidism    Diabetes mellitus  Hyperlipidemia    Patient does have a history of cocaine use              Past and Surgical hx as in H and P  Social History:     Tobacco:    reports that she has been smoking cigarettes. She has a 15.00 pack-year smoking history. She has never used smokeless tobacco.  Alcohol:      reports no history of alcohol use. Drug Use:  reports current drug use. Drug: Marijuana.     Review of Systems:     POSITIVE AND NEGATIVES AS DESCRIBED IN HISTORY OF PRESENT ILLNESS ;  IN ADDITION ;  Review of Systems          All other systems negative                Physical Exam:     Physical Exam   Vitals:    08/30/21 2015 08/31/21 0800 08/31/21 2010 09/01/21 1030   BP: 108/70 (!) 111/51 110/75 (!) 106/54   Pulse: 64 50 99 81   Resp: 16 14 14 14   Temp:  98.1 °F (36.7 °C) 98 °F (36.7 °C) 97.6 °F (36.4 °C)   TempSrc:  Oral  Oral   SpO2:       Weight:       Height:                    No results for input(s): POCGLU in the last 72 hours. Body mass index is 29.63 kg/m². General Appearance:   -, CO-OPERATIVE ,                                                        Pulmonary/Chest:        Clear to auscultation bilaterally . No wheezes, rales or rhonchi . Cardiovascular:            Normal rate, regular rhythm,                                          No murmur or  Gallop . Abdomen:                       Soft, non-tender                                                                                    Extremities:                    No Edema . Neuromuskuloskeletal    ... Data:     URINE ANALYSIS: No results found for: LABURIN     CBC:  Lab Results   Component Value Date    WBC 4.8 09/01/2021    HGB 13.5 09/01/2021     09/01/2021     04/04/2012        BMP:    Lab Results   Component Value Date     09/01/2021    K 4.4 09/01/2021     09/01/2021    CO2 26 09/01/2021    BUN 14 09/01/2021    CREATININE 1.02 09/01/2021    GLUCOSE 121 09/01/2021    GLUCOSE 147 04/04/2012      LIVER PROFILE:  Lab Results   Component Value Date    ALT 18 09/01/2021    AST 26 09/01/2021    PROT 6.2 09/01/2021    BILITOT 0.17 09/01/2021    BILIDIR <0.08 09/01/2021    LABALBU 3.6 09/01/2021    LABALBU 4.1 04/04/2012                 Medications: Allergies:     Allergies   Allergen Reactions    Codeine     Chantix [Varenicline Tartrate] Rash    Magnesium Citrate Nausea And Vomiting       Current Meds:   Scheduled Meds:    pantoprazole  40 mg Oral Daily with breakfast    budesonide-formoterol  2 puff Inhalation BID    paliperidone  6 mg Oral Daily    potassium chloride  40 mEq Oral Once    nicotine  1 patch TransDERmal Daily     Continuous Infusions:   PRN Meds: albuterol sulfate HFA, acetaminophen, aluminum & magnesium hydroxide-simethicone, hydrOXYzine, ibuprofen, polyethylene glycol, traZODone, haloperidol lactate **AND** LORazepam **AND** diphenhydrAMINE, haloperidol **AND** LORazepam        Assessment :       Assessment Dx  Principal Problem:    Bipolar disorder with psychotic features ([facility] [address]  Active Problems:    Hypothyroidism    DM (diabetes mellitus) ([facility] [address]    Hypercholesteremia    Cocaine use    Hx of diabetes mellitus  Resolved Problems:    * No resolved hospital problems. *       On symbicort       Plan:     In view of history of diabetes hypertension hypothyroidism hyperlipidemia   We will recheck hemoglobin A1c CMP CBC thyroid P and decide about further treatment plans   Her blood pressure and vitals     9/1/21    · Lab data reviewed  · Thyroid function normal  · Last hemoglobin A1c 6  · Couple of years hemoglobin A1c was 11  · Diabetic control good at present medication  · Lipid profile is good 1. Home meds reviewed  2. Patient was on Metformin 1000 twice daily  3. Will start on Metformin 500 twice daily  4. Patient was not on any Lipitor thyroid meds  5. Lipid and thyroid panel okay so do not need to start      Will sign off . Thanks . Please call again , if neeeded . Thanks for consulting us . Will monitor vitals and clinical course , and  Optimize therapy  as needed . Dian Rodriguez MD    Copy sent to Dr. Dipika Paiz, DO    Pleasenote that this chart was generated using voice recognition Dragon dictation software. Although every effort was made to ensure the accuracy of this automated transcription, some errors in transcription may have occurred.
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
## 2021-09-01 NOTE — GROUP NOTE
Group Therapy Note    Date: 9/1/2021    Group Start Time: 1000  Group End Time: 0183  Group Topic: Group Therapy    [facility]    SOLANGE Fowler, ELLA        Group Therapy Note  Pt declined to attend psychotherapy at 1000 am despite encouragement. Pt offered 1:1 and refused.       Attendees: 7/19           Signature:  SOLANGE Fowler, ELLA

## 2021-09-01 NOTE — PLAN OF CARE
Problem: Altered Mood, Depressive Behavior:  Goal: Absence of self-harm  Description: Absence of self-harm  Outcome: Ongoing  Note: Patient remains free from self harm. Denies any thoughts of self harm or hallucinations. Refused select medications. Behavior controlled.

## 2021-09-01 NOTE — PROGRESS NOTES
Pharmacy Med Education Group Note    Date: 9/1/21  Start Time: 1430  End Time: 9609    Number Participants in Group:  7    Goal:  Patient will demonstrate an understanding of the medications intended purpose and possible adverse effects  Topic: [place] for Pharmacy Med Ed Group    Discipline Responsible:     OT  AT  [facility].  RT     X Other       Participation Level:     None  Minimal      X Active Listener    X Interactive    Monopolizing         Participation Quality:    X Appropriate  Inappropriate     X       Attentive        Intrusive          Sharing        Resistant          Supportive        Lethargic       Affective:     X Congruent  Incongruent  Blunted  Flat    Constricted  Anxious  Elated  Angry    Labile  Depressed  Other         Cognitive:    X Alert  Oriented PPTP     Concentration   X G  F  P   Attention Span   X G  F  P   Short-Term Memory   X G  F  P   Long-Term Memory  G  F  P   ProblemSolving/  Decision Making  G  F  P   Ability to Process  Information   X G  F  P      Contributing Factors             Delusional             Hallucinating             Flight of Ideas             Other:       Modes of Intervention:    X Education   X Support  Exploration    Clarifying  Problem Solving  Confrontation    Socialization  Limit Setting  Reality Testing    Activity  Movement  Media    Other:            Response to Learning:    X Able to verbalize current knowledge/experience    Able to verbalize/acknowledge new learning    Able to retain information    Capable of insight    Able to change behavior    Progressing to goal    Other:        Comments:     Corie Lynch, [address],  9/1/2021, 3:13 PM

## 2021-09-01 NOTE — PLAN OF CARE
Problem: Altered Mood, Depressive Behavior:  Goal: Able to verbalize and/or display a decrease in depressive symptoms  Description: Able to verbalize and/or display a decrease in depressive symptoms  8/31/2021 2228 by Tarik Wilkerson RN  Outcome: Ongoing  Note: Patient denies suicidal ideation and verbally contracts for safety. Patient remains safe during Q15 min and random safety checks. Patient remains in hospital appropriate clothing at all times and free from harmful objects. Patient is accepting of talk time with writer. Denies any thoughts to harm others, denies any hallucinations. States she is eating well and sleeping well. Social with select peers, made several phone calls this shift and remained in behavior control.

## 2021-09-02 VITALS
RESPIRATION RATE: 14 BRPM | HEIGHT: 62 IN | HEART RATE: 60 BPM | DIASTOLIC BLOOD PRESSURE: 72 MMHG | BODY MASS INDEX: 29.81 KG/M2 | WEIGHT: 162 LBS | SYSTOLIC BLOOD PRESSURE: 115 MMHG | OXYGEN SATURATION: 98 % | TEMPERATURE: 98.2 F

## 2021-09-02 PROCEDURE — 6370000000 HC RX 637 (ALT 250 FOR IP): Performed by: PSYCHIATRY & NEUROLOGY

## 2021-09-02 PROCEDURE — 6370000000 HC RX 637 (ALT 250 FOR IP): Performed by: INTERNAL MEDICINE

## 2021-09-02 PROCEDURE — 6370000000 HC RX 637 (ALT 250 FOR IP)

## 2021-09-02 PROCEDURE — 99239 HOSP IP/OBS DSCHRG MGMT >30: CPT | Performed by: PSYCHIATRY & NEUROLOGY

## 2021-09-02 RX ORDER — ALBUTEROL SULFATE 90 UG/1
2 AEROSOL, METERED RESPIRATORY (INHALATION) EVERY 4 HOURS PRN
Qty: 18 G | Refills: 3 | Status: SHIPPED | OUTPATIENT
Start: 2021-09-02 | End: 2022-06-15

## 2021-09-02 RX ORDER — BUDESONIDE AND FORMOTEROL FUMARATE DIHYDRATE 80; 4.5 UG/1; UG/1
2 AEROSOL RESPIRATORY (INHALATION) 2 TIMES DAILY
Qty: 10.2 G | Refills: 3 | Status: SHIPPED | OUTPATIENT
Start: 2021-09-02

## 2021-09-02 RX ORDER — PALIPERIDONE 6 MG/1
6 TABLET, EXTENDED RELEASE ORAL DAILY
Qty: 30 TABLET | Refills: 3 | Status: SHIPPED | OUTPATIENT
Start: 2021-09-03

## 2021-09-02 RX ORDER — PANTOPRAZOLE SODIUM 40 MG/1
40 TABLET, DELAYED RELEASE ORAL
Qty: 30 TABLET | Refills: 3 | Status: SHIPPED | OUTPATIENT
Start: 2021-09-03 | End: 2022-01-29 | Stop reason: SDUPTHER

## 2021-09-02 RX ADMIN — PALIPERIDONE 6 MG: 6 TABLET, EXTENDED RELEASE ORAL at 09:41

## 2021-09-02 RX ADMIN — METFORMIN HYDROCHLORIDE 500 MG: 500 TABLET ORAL at 09:41

## 2021-09-02 RX ADMIN — PANTOPRAZOLE SODIUM 40 MG: 40 TABLET, DELAYED RELEASE ORAL at 09:41

## 2021-09-02 NOTE — GROUP NOTE
Group Therapy Note    Date: 9/2/2021    Group Start Time: 1100  Group End Time: 2205  Group Topic: Cognitive Skills    [facility] AYAAN Arriola, CTRS        Group Therapy Note    Attendees: 6/17         Patient's Goal:  To improve communication skills     Notes:   Pt was pleasant and participated well     Status After Intervention:  Improved    Participation Level:  Active Listener and Interactive    Participation Quality: Appropriate, Attentive, Sharing and Supportive      Speech:  normal      Thought Process/Content: Logical      Affective Functioning: Congruent      Mood: euthymic      Level of consciousness:  Alert and Oriented x4      Response to Learning: Able to verbalize current knowledge/experience, Able to retain information and Progressing to goal      Endings: None Reported    Modes of Intervention: Education, Support and Problem-solving      Discipline Responsible: Psychoeducational Specialist

## 2021-09-02 NOTE — CARE COORDINATION
Name: Sb Parker    : 1980    Discharge Date: 2021    Primary Auth/Cert #: [id number]    Destination: home    Discharge Medications:      Medication List      START taking these medications    metFORMIN 500 MG tablet  Commonly known as: GLUCOPHAGE  Take 1 tablet by mouth 2 times daily (with meals)  Notes to patient: Treat type 2 diabetes      paliperidone 6 MG extended release tablet  Commonly known as: INVEGA  Take 1 tablet by mouth daily  Start taking on: September 3, 2021  Notes to patient: Mood stabilization         CHANGE how you take these medications    albuterol sulfate  (90 Base) MCG/ACT inhaler  Inhale 2 puffs into the lungs every 4 hours as needed for Wheezing  What changed: See the new instructions. Notes to patient: wheezing     budesonide-formoterol 80-4.5 MCG/ACT Aero  Commonly known as: Symbicort  Inhale 2 puffs into the lungs 2 times daily  What changed: additional instructions  Notes to patient:  For COPD  and asthma        CONTINUE taking these medications    pantoprazole 40 MG tablet  Commonly known as: PROTONIX  Take 1 tablet by mouth daily (with breakfast)  Start taking on: September 3, 2021  Notes to patient: Stomach acid control        STOP taking these medications    acetaminophen 500 MG tablet  Commonly known as: APAP Extra Strength     busPIRone 15 MG tablet  Commonly known as: BUSPAR     dicyclomine 10 MG capsule  Commonly known as: BENTYL     divalproex 500 MG extended release tablet  Commonly known as: DEPAKOTE ER     hydrocortisone 25 MG suppository  Commonly known as: ANUSOL-HC     ibuprofen 600 MG tablet  Commonly known as: ADVIL;MOTRIN     risperiDONE 2 MG tablet  Commonly known as: RISPERDAL           Where to Get Your Medications      These medications were sent to [address] [place] [address]    Phone: [phone]   · albuterol sulfate  (90 Base) MCG/ACT inhaler  · budesonide-formoterol 80-4.5 MCG/ACT Aero  · metFORMIN 500 MG tablet  · paliperidone 6 MG extended release tablet  · pantoprazole 40 MG tablet         Follow Up Appointment: Discharge to home:  [address] [place]   [place], [address]  Go on 9/2/2021  If Arsen Hu does not have a ride home, please send via [place] [address] [address]  Phone: [phone]  Fax: [phone]    Go on 9/7/2021  You have an appointment on Tuesday, Sept. 7 at 12:30pm for a hospital discharge appointment and to be re-linked.
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
## 2021-09-02 NOTE — GROUP NOTE
Group Therapy Note    Date: 9/2/2021    Group Start Time: 0900  Group End Time: 0915  Group Topic: Community Meeting    MENA Goodwin        Group Therapy Note    Attendees: 6/18         Patient's Goal: Discuss goals for the day and for after discharge. Notes:      Status After Intervention:  Improved    Participation Level:  Active Listener    Participation Quality: Appropriate      Speech:  normal      Thought Process/Content: Logical      Affective Functioning: Congruent      Mood: normal      Level of consciousness:  Alert      Response to Learning: Able to verbalize current knowledge/experience      Endings: None Reported    Modes of Intervention: Education      Discipline Responsible: [facility] [address] [facility] Tech      Signature:  Parker Goodwin

## 2021-09-02 NOTE — BH NOTE
Patient given tobacco quitline number 58218573183 at this time, refusing to call at this time, states \" I just dont want to quit now\"- patient given information as to the dangers of long term tobacco use. Continue to reinforce the importance of tobacco cessation.

## 2021-09-02 NOTE — GROUP NOTE
Group Therapy Note    Date: 9/2/2021    Group Start Time: 1000  Group End Time: 1030  Group Topic: Psychotherapy    [facility]    Palmer Clark        Group Therapy Note    Attendees: 5/17         Patient's Goal:  PT will demonstrate increased interpersonal interaction and a clear understanding on multiple types of coping skills relating to the here-and-now therapeutic practice. Notes:  Patient is making progress, AEB participating in group discussion, actively listening, and supporting other group members. PT participates in group and encourages others to participate     Status After Intervention:  Improved    Participation Level: Active Listener and Interactive    Participation Quality: Appropriate, Attentive and Sharing      Speech:  normal      Thought Process/Content: Logical      Affective Functioning: Flat      Mood: depressed      Level of consciousness:  Alert, Oriented x4 and Attentive      Response to Learning: Able to verbalize/acknowledge new learning and Progressing to goal      Endings: None Reported    Modes of Intervention: Support, Socialization, Exploration, Clarifying and Problem-solving      Discipline Responsible: /Counselor      Signature:   Palmer Clark

## 2021-09-02 NOTE — PLAN OF CARE
Problem: Altered Mood, Depressive Behavior:  Goal: Able to verbalize and/or display a decrease in depressive symptoms  Description: Able to verbalize and/or display a decrease in depressive symptoms  Outcome: Ongoing  Note: Patient is able to verbalize a decrease in depressive symptoms. Patient reports she is anxious and hopes to be discharged tomorrow. She states that she has a new place to live and needs to be discharged before 10:00am.  She is out in the day room brightened and social playing cards with peers. She is pleasant and cooperative with staff. She denies suicidal ideation and thoughts of self harm. Staff maintains Q 15 minute safety checks. Problem: Altered Mood, Depressive Behavior:  Goal: Ability to disclose and discuss suicidal ideas will improve  Description: Ability to disclose and discuss suicidal ideas will improve  Outcome: Ongoing     Problem: Altered Mood, Depressive Behavior:  Goal: Absence of self-harm  Description: Absence of self-harm  9/1/2021 2113 by Sherry Oliveira RN  Outcome: Ongoing  Note: Patient remains absent of self harm. Problem: Tobacco Use:  Goal: Inpatient tobacco use cessation counseling participation  Description: Inpatient tobacco use cessation counseling participation  Outcome: Ongoing  Note: Patient given tobacco quitline number 14404804792 at this time, refusing to call at this time, states \" I just dont want to quit now\"- patient given information as to the dangers of long term tobacco use. Continue to reinforce the importance of tobacco cessation. Problem: Pain:  Goal: Pain level will decrease  Description: Pain level will decrease  Outcome: Ongoing  Note: Patient denies pain on assessment, staff will reassess as needed.      Problem: Pain:  Goal: Control of acute pain  Description: Control of acute pain  Outcome: Ongoing     Problem: Pain:  Goal: Control of chronic pain  Description: Control of chronic pain  Outcome: Ongoing

## 2021-09-16 RX ORDER — ALBUTEROL SULFATE 2.5 MG/3ML
2.5 SOLUTION RESPIRATORY (INHALATION) EVERY 6 HOURS PRN
Qty: 360 ML | Refills: 3 | Status: SHIPPED | OUTPATIENT
Start: 2021-09-16

## 2021-11-26 ENCOUNTER — TELEPHONE (OUTPATIENT)
Dept: FAMILY MEDICINE CLINIC | Age: 41
End: 2021-11-26

## 2022-01-28 NOTE — TELEPHONE ENCOUNTER
Ana Laura Matamoros is calling to request a refill on the following medication(s):    Last Visit Date (If Applicable):  4/0/9416    Next Visit Date:    Visit date not found    Medication Request:  Requested Prescriptions     Pending Prescriptions Disp Refills    pantoprazole (PROTONIX) 40 MG tablet 30 tablet 3     Sig: Take 1 tablet by mouth daily (with breakfast)

## 2022-01-29 RX ORDER — PANTOPRAZOLE SODIUM 40 MG/1
40 TABLET, DELAYED RELEASE ORAL
Qty: 30 TABLET | Refills: 3 | Status: SHIPPED | OUTPATIENT
Start: 2022-01-29 | End: 2022-05-16

## 2022-05-12 ENCOUNTER — OFFICE VISIT (OUTPATIENT)
Dept: ORTHOPEDIC SURGERY | Age: 42
End: 2022-05-12
Payer: COMMERCIAL

## 2022-05-12 VITALS — WEIGHT: 165 LBS | BODY MASS INDEX: 30.36 KG/M2 | HEIGHT: 62 IN

## 2022-05-12 DIAGNOSIS — M67.432 GANGLION CYST OF VOLAR ASPECT OF LEFT WRIST: ICD-10-CM

## 2022-05-12 DIAGNOSIS — M65.332 TRIGGER MIDDLE FINGER OF LEFT HAND: ICD-10-CM

## 2022-05-12 DIAGNOSIS — G56.02 CARPAL TUNNEL SYNDROME OF LEFT WRIST: Primary | ICD-10-CM

## 2022-05-12 PROCEDURE — 4004F PT TOBACCO SCREEN RCVD TLK: CPT | Performed by: STUDENT IN AN ORGANIZED HEALTH CARE EDUCATION/TRAINING PROGRAM

## 2022-05-12 PROCEDURE — 99213 OFFICE O/P EST LOW 20 MIN: CPT | Performed by: STUDENT IN AN ORGANIZED HEALTH CARE EDUCATION/TRAINING PROGRAM

## 2022-05-12 PROCEDURE — G8417 CALC BMI ABV UP PARAM F/U: HCPCS | Performed by: STUDENT IN AN ORGANIZED HEALTH CARE EDUCATION/TRAINING PROGRAM

## 2022-05-12 PROCEDURE — G8427 DOCREV CUR MEDS BY ELIG CLIN: HCPCS | Performed by: STUDENT IN AN ORGANIZED HEALTH CARE EDUCATION/TRAINING PROGRAM

## 2022-05-12 NOTE — PROGRESS NOTES
[address] [facility]  [address] [facility] [address]   Dept: [phone]  Dept Fax: [phone]        Orthopaedic Clinic Follow Up      Subjective:   Deny Emery is a 35 y.o. right hand dominant female who presents to the clinic today with complaint of left carpal tunnel syndrome. She states that she had a carpal tunnel release completed back in 2020 for the right hand and got very good relief from this. She states over the past 4 months she has had very similar symptoms begin on the left hand. Symptoms started after she moved into a new home and has been performing projects on the home. She states they have been getting worse over time. She complains of numbness and tingling most specifically in the index/long finger. She actually doesn't have numbness/tingling in the thumb. She does state she has had issues with  strength over the past few months. She also complains of a swelling on the volar aspect of the left wrist and also complains of a locking and palpable nodule at the 3rd metacarpal head. She states that the nodule began after she was hammering a nail into a wall. There are times when her finger catches and locks and requires manual manipulation to straighten, although this is infrequent. As for the swelling on her wrist, this comes and goes and is worse when she is using her hand. She had no specific injuries or inciting factors that caused the swelling in her wrist.      ROS:  Gen: No fevers/chills   Cardio: no chest pain   Lungs: no shortness of breath   MSK: Swelling to volar aspect of right wrist    Neuro: Numbness/tingling to index/long finger    Objective :   Physical exam  Gen: AAOx3, no acute distress  Cardio: regular rate  Lungs: symmetrical chest expansion, no audible wheezing   MSK:   Cervical spine: Non tender to palpation. No radiation of numbness/tingling with flexion/extension/sidebending.      Left upper extremity: No ecchymoses, abrasions, or lacerations. Skin intact. Ganglion cyst present along volar aspect of wrist on the ulnar side. Tender to palpation. + Tinel's test at carpal tunnel. + Phalen and + reverse Phalen tests. Tender to palpation over A1 pulley of long finger on left. With active flexion/extension of digit, can appreciate clicking at level of A1 pulley. Compartments soft and compressible. Hand warm and perfused w/ BCR; 2+ radial pulse. Median/Radial/Ulnar/AIN/PIN gross motor intact. Median/Radial/Ulnar nerve SILT although very diminished along index and long fingers, both volarly and dorsally. Radiology:  No xrays taken from today's visit. Assessment:   35y.o. year old female with the following:      Diagnosis Orders   1. Carpal tunnel syndrome of left wrist  EMG   2. Ganglion cyst of volar aspect of left wrist     3. Trigger middle finger of left hand        Plan:      Patient seen and examined today. Discussed natural history and etiology of left carpal tunnel syndrome, ganglion cyst, and long finger trigger finger. Prior to proceeding with carpal tunnel release, we will obtain an EMG to confirm no compression at other sides. We discussed ganglion cyst excision at time of CTR if the patient still continues to have issues with the cyst. In addition, we will consider steroid injection of the left long finger trigger finger at time of CTR, as the patient's symptoms are mild. We will see the patient back to discuss EMG results. Follow up:Return for EMG results. No orders of the defined types were placed in this encounter. Orders Placed This Encounter   Procedures    EMG     Standing Status:   Future     Standing Expiration Date:   5/12/2023     Order Specific Question:   Which body part?      Answer:   Left upper extremity       Electronically signed by Nancy Rivera DO on 5/12/2022 at 9:04 AM

## 2022-05-16 RX ORDER — PANTOPRAZOLE SODIUM 40 MG/1
40 TABLET, DELAYED RELEASE ORAL
Qty: 24 TABLET | Refills: 3 | Status: SHIPPED | OUTPATIENT
Start: 2022-05-16 | End: 2022-05-17 | Stop reason: SDUPTHER

## 2022-05-17 ENCOUNTER — TELEPHONE (OUTPATIENT)
Dept: FAMILY MEDICINE CLINIC | Age: 42
End: 2022-05-17

## 2022-05-17 RX ORDER — PANTOPRAZOLE SODIUM 40 MG/1
40 TABLET, DELAYED RELEASE ORAL
Qty: 30 TABLET | Refills: 3 | Status: SHIPPED | OUTPATIENT
Start: 2022-05-17

## 2022-06-15 NOTE — TELEPHONE ENCOUNTER
Nathalie Millan is calling to request a refill on the following medication(s):    Last Visit Date (If Applicable):  3/8/3588    Next Visit Date:    Visit date not found    Medication Request:  Requested Prescriptions     Pending Prescriptions Disp Refills    VENTOLIN  (90 Base) MCG/ACT inhaler [Pharmacy Med Name: VENTOLIN HFA INHALER 108 (90 BAS Aerosol] 18 g 3     Sig: INHALE 2 PUFFS INTO THE LUNGS EVERY 4 HOURS AS NEEDED FOR WHEEZING

## 2022-06-27 ENCOUNTER — OFFICE VISIT (OUTPATIENT)
Dept: FAMILY MEDICINE CLINIC | Age: 42
End: 2022-06-27
Payer: COMMERCIAL

## 2022-06-27 VITALS
DIASTOLIC BLOOD PRESSURE: 70 MMHG | SYSTOLIC BLOOD PRESSURE: 105 MMHG | OXYGEN SATURATION: 98 % | WEIGHT: 162 LBS | HEIGHT: 62 IN | HEART RATE: 84 BPM | BODY MASS INDEX: 29.81 KG/M2

## 2022-06-27 DIAGNOSIS — K58.1 IRRITABLE BOWEL SYNDROME WITH CONSTIPATION: Primary | ICD-10-CM

## 2022-06-27 DIAGNOSIS — E11.9 TYPE 2 DIABETES MELLITUS WITHOUT COMPLICATION, WITHOUT LONG-TERM CURRENT USE OF INSULIN (HCC): ICD-10-CM

## 2022-06-27 DIAGNOSIS — J44.9 CHRONIC OBSTRUCTIVE PULMONARY DISEASE, UNSPECIFIED COPD TYPE (HCC): ICD-10-CM

## 2022-06-27 DIAGNOSIS — M79.672 FOOT PAIN, LEFT: ICD-10-CM

## 2022-06-27 DIAGNOSIS — K21.9 GASTROESOPHAGEAL REFLUX DISEASE WITHOUT ESOPHAGITIS: ICD-10-CM

## 2022-06-27 LAB — HBA1C MFR BLD: 6.4 %

## 2022-06-27 PROCEDURE — G8417 CALC BMI ABV UP PARAM F/U: HCPCS | Performed by: FAMILY MEDICINE

## 2022-06-27 PROCEDURE — 83036 HEMOGLOBIN GLYCOSYLATED A1C: CPT | Performed by: FAMILY MEDICINE

## 2022-06-27 PROCEDURE — 2022F DILAT RTA XM EVC RTNOPTHY: CPT | Performed by: FAMILY MEDICINE

## 2022-06-27 PROCEDURE — 99214 OFFICE O/P EST MOD 30 MIN: CPT | Performed by: FAMILY MEDICINE

## 2022-06-27 PROCEDURE — 4004F PT TOBACCO SCREEN RCVD TLK: CPT | Performed by: FAMILY MEDICINE

## 2022-06-27 PROCEDURE — G8427 DOCREV CUR MEDS BY ELIG CLIN: HCPCS | Performed by: FAMILY MEDICINE

## 2022-06-27 PROCEDURE — 3046F HEMOGLOBIN A1C LEVEL >9.0%: CPT | Performed by: FAMILY MEDICINE

## 2022-06-27 PROCEDURE — 3023F SPIROM DOC REV: CPT | Performed by: FAMILY MEDICINE

## 2022-06-27 SDOH — ECONOMIC STABILITY: FOOD INSECURITY: WITHIN THE PAST 12 MONTHS, THE FOOD YOU BOUGHT JUST DIDN'T LAST AND YOU DIDN'T HAVE MONEY TO GET MORE.: NEVER TRUE

## 2022-06-27 SDOH — ECONOMIC STABILITY: FOOD INSECURITY: WITHIN THE PAST 12 MONTHS, YOU WORRIED THAT YOUR FOOD WOULD RUN OUT BEFORE YOU GOT MONEY TO BUY MORE.: NEVER TRUE

## 2022-06-27 ASSESSMENT — PATIENT HEALTH QUESTIONNAIRE - PHQ9
SUM OF ALL RESPONSES TO PHQ QUESTIONS 1-9: 8
4. FEELING TIRED OR HAVING LITTLE ENERGY: 3
10. IF YOU CHECKED OFF ANY PROBLEMS, HOW DIFFICULT HAVE THESE PROBLEMS MADE IT FOR YOU TO DO YOUR WORK, TAKE CARE OF THINGS AT HOME, OR GET ALONG WITH OTHER PEOPLE: 0
7. TROUBLE CONCENTRATING ON THINGS, SUCH AS READING THE NEWSPAPER OR WATCHING TELEVISION: 0
3. TROUBLE FALLING OR STAYING ASLEEP: 3
SUM OF ALL RESPONSES TO PHQ QUESTIONS 1-9: 8
8. MOVING OR SPEAKING SO SLOWLY THAT OTHER PEOPLE COULD HAVE NOTICED. OR THE OPPOSITE, BEING SO FIGETY OR RESTLESS THAT YOU HAVE BEEN MOVING AROUND A LOT MORE THAN USUAL: 2
9. THOUGHTS THAT YOU WOULD BE BETTER OFF DEAD, OR OF HURTING YOURSELF: 0
SUM OF ALL RESPONSES TO PHQ QUESTIONS 1-9: 8
5. POOR APPETITE OR OVEREATING: 0
6. FEELING BAD ABOUT YOURSELF - OR THAT YOU ARE A FAILURE OR HAVE LET YOURSELF OR YOUR FAMILY DOWN: 0
1. LITTLE INTEREST OR PLEASURE IN DOING THINGS: 0
2. FEELING DOWN, DEPRESSED OR HOPELESS: 0
SUM OF ALL RESPONSES TO PHQ9 QUESTIONS 1 & 2: 0
SUM OF ALL RESPONSES TO PHQ QUESTIONS 1-9: 8

## 2022-06-27 ASSESSMENT — SOCIAL DETERMINANTS OF HEALTH (SDOH): HOW HARD IS IT FOR YOU TO PAY FOR THE VERY BASICS LIKE FOOD, HOUSING, MEDICAL CARE, AND HEATING?: NOT HARD AT ALL

## 2022-06-27 NOTE — PROGRESS NOTES
[facility]  [address] [place] [address] Dr KIM [address] [id number]  Dept: [phone]      [place] is a 35 y.o. female who presents today for follow up on her  medical conditions as noted below.       Chief Complaint   Patient presents with    Abdominal Pain    Other     script for a/c        Patient Active Problem List:     Hypercholesteremia     Panic anxiety syndrome     DM (diabetes mellitus) (HCC)     IBS (irritable bowel syndrome)     Mood swings     Hypothyroidism     Constipation     Endometriosis     Agoraphobia     HETAL RS with enterocele and Ovarian Preservation 9/23/13     Left ankle pain     Diarrhea     Vomiting     Rectal bleeding     Midline low back pain without sciatica     Irritable bowel syndrome without diarrhea     Gastroesophageal reflux disease without esophagitis     Abdominal pain, generalized     Carpal tunnel syndrome on right     Major depression with psychotic features ([address]     Bipolar disorder with psychotic features ([address]     Cocaine use     Hx of diabetes mellitus     Past Medical History:   Diagnosis Date    Agoraphobia     Anxiety     sees Dr. Jose Encinas at [facility]    Depression     Diarrhea     Drug abuse, marijuana     Dysmenorrhea     Endometriosis 1/23/2013    new dx    Fibroid     GERD (gastroesophageal reflux disease)     History of nipple discharge     Hyperlipidemia     Hypertension     no longer on meds-anxiety induced HTN    Hypothyroidism     Irritable bowel syndrome     LGSIL (low grade squamous intraepithelial dysplasia) 10/11    Menorrhagia     Midline low back pain without sciatica 1/26/2016    Mood swings     Ovarian cyst     Pelvic adhesions     Pelvic pain     Prolactin increased 2/12, 11/12    Rectal bleeding     Type II or unspecified type diabetes mellitus without mention of complication, not stated as uncontrolled     Dr. Sherly Mathews Vocal cord polyps     Vomiting     Wellness examination     Dr. Brittany Alvarado last seen 2020      Past Surgical History:   Procedure Laterality Date    ABDOMINAL ADHESION SURGERY  13    CARPAL TUNNEL RELEASE Right 2020    CARPAL TUNNEL RELEASE Right 2020    CARPAL TUNNEL RELEASE (SUPINE) 3080 TABLE, ARM TABLE performed by Cody Irizarry DO at [facility] COLONOSCOPY  14    Benign biopsies    COLONOSCOPY  7-14-15    incomplete/poor prep, hemorrhoids.     ENTEROCELE REPAIR  13    ESOPHAGOSCOPY  1/22/15    HYSTERECTOMY (CERVIX STATUS UNKNOWN)  13    HETAL, Right Salpingectomy, Enterocele, SHON    LAPAROSCOPY  2013    Diagnostic converted to [address]       due to cyst- right    TUBAL LIGATION      UPPER GASTROINTESTINAL ENDOSCOPY      WISDOM TOOTH EXTRACTION      x4     Family History   Problem Relation Age of Onset    Kidney Disease Mother     Diabetes Mother     Heart Disease Mother     Lung Cancer Maternal Grandmother     Diabetes Maternal Grandmother     Lung Cancer Maternal Grandfather     Diabetes Maternal Grandfather     Breast Cancer Neg Hx     Cancer Neg Hx     Colon Cancer Neg Hx     Eclampsia Neg Hx     Hypertension Neg Hx     Ovarian Cancer Neg Hx      Labor Neg Hx     Spont Abortions Neg Hx     Stroke Neg Hx        Current Outpatient Medications   Medication Sig Dispense Refill    VENTOLIN  (90 Base) MCG/ACT inhaler INHALE 2 PUFFS INTO THE LUNGS EVERY 4 HOURS AS NEEDED FOR WHEEZING 18 g 3    pantoprazole (PROTONIX) 40 MG tablet Take 1 tablet by mouth daily (with breakfast) 30 tablet 3    albuterol (PROVENTIL) (2.5 MG/3ML) 0.083% nebulizer solution TAKE 3 MLS BY NEBULIZATION EVERY 6 HOURS AS NEEDED FOR WHEEZING 360 mL 3    budesonide-formoterol (SYMBICORT) 80-4.5 MCG/ACT AERO Inhale 2 puffs into the lungs 2 times daily 10.2 g 3    paliperidone (INVEGA) 6 MG extended release tablet Take 1 tablet by mouth daily (Patient not swallowing and neck pain. Eyes: Negative for photophobia and visual disturbance. Respiratory: Negative. Negative for chest tightness and shortness of breath. Cardiovascular: Negative. Negative for chest pain and leg swelling. Gastrointestinal: Negative. Negative for abdominal pain and blood in stool. Endocrine: Negative for cold intolerance and polyuria. Genitourinary: Negative for dysuria and hematuria. Musculoskeletal: Negative. Skin: Negative for rash. Allergic/Immunologic: Negative. Neurological: Negative. Negative for dizziness, weakness and numbness. Hematological: Negative. Negative for adenopathy. Does not bruise/bleed easily. Psychiatric/Behavioral: Negative for sleep disturbance, dysphoric mood and  decreased concentration. The patient is not nervous/anxious. Objective:     Physical Exam:     Nursing note and vitals reviewed. /70   Pulse 84   Ht 5' 2\" (1.575 m)   Wt 162 lb (73.5 kg)   LMP 05/30/2013   SpO2 98%   BMI 29.63 kg/m²   Constitutional: She is oriented to person, place, and time. She   appears well-developed and well-nourished. HENT:   Head: Normocephalic and atraumatic. Right Ear: External ear normal. Tympanic membrane is not erythematous. No middle ear effusion. Left Ear: External ear normal. Tympanic membrane is not erythematous. No middle ear effusion. Nose: No mucosal edema. Mouth/Throat: Oropharynx is clear and moist. No posterior oropharyngeal erythema. Eyes: Conjunctivae and EOM are normal. Pupils are equal, round, and reactive to light. Neck: Normal range of motion. Neck supple. No thyromegaly present. Cardiovascular: Normal rate, regular rhythm and normal heart sounds. No murmur heard. Pulmonary/Chest: Effort normal and breath sounds normal. She has no wheezes. Shehas no rales. Abdominal: Soft. Bowel sounds are normal. She exhibits no distension and no mass. There is no tenderness.  There is no rebound and no guarding. Genitourinary/Anorectal:deferred  Musculoskeletal: Normal range of motion. She exhibits no edema or tenderness. Corn under her third toe on the left foot her feet were very dirty  Lymphadenopathy: She has no cervical adenopathy. Neurological: She is alert and oriented to person, place, and time. She has normal reflexes. Skin: Skin is warm and dry. No rash noted. Psychiatric: She has a normal mood and affect. Her   behavior is normal.       Assessment:      1. Irritable bowel syndrome with constipation    2. Gastroesophageal reflux disease without esophagitis    3. Foot pain, left    4. Type 2 diabetes mellitus without complication, without long-term current use of insulin ([facility]    5. Chronic obstructive pulmonary disease, unspecified COPD type ([facility]          Plan:      Call or return to clinic prn if these symptoms worsen or fail to improve as anticipated. I have reviewed the instructions with the patient, answering all questions to her satisfaction. Return if symptoms worsen or fail to improve. Orders Placed This Encounter   Procedures   [address] [place] [address], [place] [address], [place], Podiatry, [place]     Referral Priority:   Routine     Referral Type:   Eval and Treat     Referral Reason:   Specialty Services Required     Referred to Provider:   Huma Carmichael DPM     Requested Specialty:   Podiatry     Number of Visits Requested:   1    POCT glycosylated hemoglobin (Hb A1C)     No orders of the defined types were placed in this encounter.     Continue stool softeners I advised her not to consume any chocolate or dairy but she was adamant that this helps her  Follow-up with gastroenterology I gave her referral to see a podiatrist  And a prescription to get a air conditioner unit    Electronically signed by Adolph Bellamy DO on 6/27/2022 at 2:49 PM

## 2022-06-30 ENCOUNTER — TELEPHONE (OUTPATIENT)
Dept: FAMILY MEDICINE CLINIC | Age: 42
End: 2022-06-30

## 2022-06-30 DIAGNOSIS — K58.1 IRRITABLE BOWEL SYNDROME WITH CONSTIPATION: Primary | ICD-10-CM

## 2022-06-30 DIAGNOSIS — K21.9 GASTROESOPHAGEAL REFLUX DISEASE WITHOUT ESOPHAGITIS: ICD-10-CM

## 2022-06-30 NOTE — TELEPHONE ENCOUNTER
QUESTIONS   Reason for referral request? Melia Moctezuma believes [facility]   Has the physician seen you for this condition before? No   Preferred Specialist (if applicable)? Do you already have an appointment scheduled? No   Additional Information for Provider? Patient sent referral request back in   May and the office has stated nothing has been sent over.  Patient is   trying to make appt there ---------------------------------------------------------------------------

## 2022-06-30 NOTE — TELEPHONE ENCOUNTER
I kind of do not know what this message means so if she thinks she wants a different referral?  She needs to call and make the appointment they are not can to call her

## 2022-07-01 ENCOUNTER — TELEPHONE (OUTPATIENT)
Dept: GASTROENTEROLOGY | Age: 42
End: 2022-07-01

## 2022-07-05 ENCOUNTER — HOSPITAL ENCOUNTER (EMERGENCY)
Age: 42
Discharge: LEFT BEFORE DISCHARGE | End: 2022-07-06
Attending: EMERGENCY MEDICINE
Payer: COMMERCIAL

## 2022-07-05 VITALS
HEIGHT: 62 IN | RESPIRATION RATE: 16 BRPM | HEART RATE: 84 BPM | DIASTOLIC BLOOD PRESSURE: 73 MMHG | OXYGEN SATURATION: 98 % | WEIGHT: 163 LBS | TEMPERATURE: 97.8 F | SYSTOLIC BLOOD PRESSURE: 110 MMHG | BODY MASS INDEX: 30 KG/M2

## 2022-07-05 DIAGNOSIS — R10.84 GENERALIZED ABDOMINAL PAIN: Primary | ICD-10-CM

## 2022-07-05 LAB
ABSOLUTE EOS #: 0.1 K/UL (ref 0–0.4)
ABSOLUTE LYMPH #: 1.6 K/UL (ref 1–4.8)
ABSOLUTE MONO #: 0.7 K/UL (ref 0.1–1.3)
ALBUMIN SERPL-MCNC: 4.1 G/DL (ref 3.5–5.2)
ALP BLD-CCNC: 73 U/L (ref 35–104)
ALT SERPL-CCNC: 10 U/L (ref 5–33)
ANION GAP SERPL CALCULATED.3IONS-SCNC: 10 MMOL/L (ref 9–17)
AST SERPL-CCNC: 12 U/L
BASOPHILS # BLD: 1 % (ref 0–2)
BASOPHILS ABSOLUTE: 0.1 K/UL (ref 0–0.2)
BILIRUB SERPL-MCNC: 0.33 MG/DL (ref 0.3–1.2)
BUN BLDV-MCNC: 22 MG/DL (ref 6–20)
CALCIUM SERPL-MCNC: 10.7 MG/DL (ref 8.6–10.4)
CHLORIDE BLD-SCNC: 102 MMOL/L (ref 98–107)
CO2: 28 MMOL/L (ref 20–31)
CREAT SERPL-MCNC: 0.99 MG/DL (ref 0.5–0.9)
EOSINOPHILS RELATIVE PERCENT: 1 % (ref 0–4)
GFR AFRICAN AMERICAN: >60 ML/MIN
GFR NON-AFRICAN AMERICAN: >60 ML/MIN
GFR SERPL CREATININE-BSD FRML MDRD: ABNORMAL ML/MIN/{1.73_M2}
GLUCOSE BLD-MCNC: 136 MG/DL (ref 70–99)
HCT VFR BLD CALC: 42.6 % (ref 36–46)
HEMOGLOBIN: 14.2 G/DL (ref 12–16)
LIPASE: 36 U/L (ref 13–60)
LYMPHOCYTES # BLD: 18 % (ref 24–44)
MCH RBC QN AUTO: 29.2 PG (ref 26–34)
MCHC RBC AUTO-ENTMCNC: 33.2 G/DL (ref 31–37)
MCV RBC AUTO: 87.9 FL (ref 80–100)
MONOCYTES # BLD: 7 % (ref 1–7)
PDW BLD-RTO: 14.4 % (ref 11.5–14.9)
PLATELET # BLD: 208 K/UL (ref 150–450)
PMV BLD AUTO: 8.4 FL (ref 6–12)
POTASSIUM SERPL-SCNC: 4.5 MMOL/L (ref 3.7–5.3)
RBC # BLD: 4.85 M/UL (ref 4–5.2)
SEG NEUTROPHILS: 73 % (ref 36–66)
SEGMENTED NEUTROPHILS ABSOLUTE COUNT: 6.8 K/UL (ref 1.3–9.1)
SODIUM BLD-SCNC: 140 MMOL/L (ref 135–144)
TOTAL PROTEIN: 7 G/DL (ref 6.4–8.3)
WBC # BLD: 9.2 K/UL (ref 3.5–11)

## 2022-07-05 PROCEDURE — 96374 THER/PROPH/DIAG INJ IV PUSH: CPT

## 2022-07-05 PROCEDURE — 99284 EMERGENCY DEPT VISIT MOD MDM: CPT

## 2022-07-05 PROCEDURE — 85025 COMPLETE CBC W/AUTO DIFF WBC: CPT

## 2022-07-05 PROCEDURE — 36415 COLL VENOUS BLD VENIPUNCTURE: CPT

## 2022-07-05 PROCEDURE — 2580000003 HC RX 258: Performed by: EMERGENCY MEDICINE

## 2022-07-05 PROCEDURE — 96375 TX/PRO/DX INJ NEW DRUG ADDON: CPT

## 2022-07-05 PROCEDURE — 81025 URINE PREGNANCY TEST: CPT

## 2022-07-05 PROCEDURE — 6360000002 HC RX W HCPCS: Performed by: EMERGENCY MEDICINE

## 2022-07-05 PROCEDURE — 83690 ASSAY OF LIPASE: CPT

## 2022-07-05 PROCEDURE — 81001 URINALYSIS AUTO W/SCOPE: CPT

## 2022-07-05 PROCEDURE — 80053 COMPREHEN METABOLIC PANEL: CPT

## 2022-07-05 PROCEDURE — 2500000003 HC RX 250 WO HCPCS: Performed by: EMERGENCY MEDICINE

## 2022-07-05 PROCEDURE — A4216 STERILE WATER/SALINE, 10 ML: HCPCS | Performed by: EMERGENCY MEDICINE

## 2022-07-05 RX ORDER — 0.9 % SODIUM CHLORIDE 0.9 %
1000 INTRAVENOUS SOLUTION INTRAVENOUS ONCE
Status: COMPLETED | OUTPATIENT
Start: 2022-07-05 | End: 2022-07-06

## 2022-07-05 RX ORDER — METOCLOPRAMIDE HYDROCHLORIDE 5 MG/ML
10 INJECTION INTRAMUSCULAR; INTRAVENOUS ONCE
Status: COMPLETED | OUTPATIENT
Start: 2022-07-05 | End: 2022-07-05

## 2022-07-05 RX ORDER — MORPHINE SULFATE 4 MG/ML
4 INJECTION, SOLUTION INTRAMUSCULAR; INTRAVENOUS ONCE
Status: DISCONTINUED | OUTPATIENT
Start: 2022-07-05 | End: 2022-07-05

## 2022-07-05 RX ADMIN — SODIUM CHLORIDE 1000 ML: 9 INJECTION, SOLUTION INTRAVENOUS at 23:31

## 2022-07-05 RX ADMIN — FAMOTIDINE 20 MG: 10 INJECTION, SOLUTION INTRAVENOUS at 23:31

## 2022-07-05 RX ADMIN — METOCLOPRAMIDE HYDROCHLORIDE 10 MG: 5 INJECTION INTRAMUSCULAR; INTRAVENOUS at 23:31

## 2022-07-05 NOTE — TELEPHONE ENCOUNTER
Pt called stating she needs to see Dr. Zandra Roy asap- pt already dismissed from practice. Pt stated she will go to another GI office. Called ended.

## 2022-07-05 NOTE — LETTER
[facility] ED  [address]  [place] [address]  Phone: [phone]    Patient: Flor Yu  YOB: 1980  Date: 7/6/2022 Time: 1:24 AM    Leaving the Hospital Against Medical Advice    Chart #:[id number]    This will certify that I, the undersigned,    ______________________________________________________________________    A patient in the above named medical center, having requested discharge and removal from the medical center against the advice of my attending physician(s), hereby release the Emergency Department, its physicians, officers and employees, severally and individually, from any and all liability of any nature whatsoever for any injury or harm or complication of any kind that may result directly or indirectly, by reason of my terminating my stay as a patient from [facility], and hereby waive any and all rights of action I may now have or later acquire as a result of my voluntary departure from [facility] and the termination of my stay as a patient therein. This release is made with the full knowledge of the danger that may result from the action which I am taking.       Date:_______________________                         ___________________________                                                                                    Patient/Legal Representative    Witness:        ____________________________                          ___________________________  Nurse                                                                        Physician

## 2022-07-06 ENCOUNTER — APPOINTMENT (OUTPATIENT)
Dept: CT IMAGING | Age: 42
End: 2022-07-06
Payer: COMMERCIAL

## 2022-07-06 ENCOUNTER — TELEPHONE (OUTPATIENT)
Dept: FAMILY MEDICINE CLINIC | Age: 42
End: 2022-07-06

## 2022-07-06 LAB
BACTERIA: ABNORMAL
BILIRUBIN URINE: NEGATIVE
CASTS UA: ABNORMAL /LPF
CASTS UA: ABNORMAL /LPF
COLOR: YELLOW
EPITHELIAL CELLS UA: ABNORMAL /HPF
GLUCOSE URINE: NEGATIVE
HCG(URINE) PREGNANCY TEST: NEGATIVE
KETONES, URINE: NEGATIVE
LEUKOCYTE ESTERASE, URINE: NEGATIVE
NITRITE, URINE: NEGATIVE
PH UA: 5.5 (ref 5–8)
PROTEIN UA: NEGATIVE
RBC UA: ABNORMAL /HPF
SPECIFIC GRAVITY UA: 1.02 (ref 1–1.03)
TURBIDITY: CLEAR
URINE HGB: NEGATIVE
UROBILINOGEN, URINE: NORMAL
WBC UA: ABNORMAL /HPF

## 2022-07-06 ASSESSMENT — ENCOUNTER SYMPTOMS
SHORTNESS OF BREATH: 0
ABDOMINAL PAIN: 1
EYE PAIN: 0
COLOR CHANGE: 0
NAUSEA: 1
BACK PAIN: 0

## 2022-07-06 NOTE — TELEPHONE ENCOUNTER
[facility] Clinical Staff  Subject: Message to Provider     QUESTIONS   Information for Provider? Patient would like a call to know if a referral   has been faxed over to Dr. Chang Coe (GI) yet. Please call to confirm. Could   not see Dr. Shantal Joseph until Dec. Also patient is having stomach pain and   would like to know what she can take for that. When she eats, her stomach   hurts. Please call to discuss these 2 issues.    ---------------------------------------------------------------------------   --------------   Warden Kent AWOQ   [phone]; OK to leave message on voicemail   ---------------------------------------------------------------------------   --------------   SCRIPT ANSWERS   undefined

## 2022-07-06 NOTE — ED PROVIDER NOTES
EMERGENCY DEPARTMENT ENCOUNTER    Pt Name: Pura Luna  MRN: [id number]  [place] 1980  Date of evaluation: 7/5/22  CHIEF COMPLAINT       Chief Complaint   Patient presents with    Abdominal Pain     HISTORY OF PRESENT ILLNESS   39-year-old female presents for abdominal pain. Patient reports that she has a history of abdominal pain reports that she had been seeing GI, states that she been having worsening abdominal pain for the last few months but in the last couple days has been much worse, located in the lower abdomen, described as an aching pain, denies anything making it better or worse, admits associated nausea, denies any change in bowel habits, denies any difficulty urinating or pain with urination. Patient reports that she been trying to get in with her GI doctor but has been unable to. The history is provided by the patient. REVIEW OF SYSTEMS     Review of Systems   Constitutional: Negative for fever. HENT: Negative for congestion and ear pain. Eyes: Negative for pain. Respiratory: Negative for shortness of breath. Cardiovascular: Negative for chest pain, palpitations and leg swelling. Gastrointestinal: Positive for abdominal pain and nausea. Genitourinary: Negative for dysuria and flank pain. Musculoskeletal: Negative for back pain. Skin: Negative for color change. Neurological: Negative for numbness and headaches. Psychiatric/Behavioral: Negative for confusion. All other systems reviewed and are negative.     PASTMEDICAL HISTORY     Past Medical History:   Diagnosis Date    Agoraphobia     Anxiety     se[facility] Governor Cur[facility]    Depression     Diarrhea     Drug abuse, marijuana     Dysmenorrhea     Endometriosis 1/23/2013    new dx    Fibroid     GERD (gastroesophageal reflux disease)     History of nipple discharge     Hyperlipidemia     Hypertension     no longer on meds-anxiety induced HTN    Hypothyroidism     Irritable bowel syndrome  LGSIL (low grade squamous intraepithelial dysplasia) 10/11    Menorrhagia     Midline low back pain without sciatica 1/26/2016    Mood swings     Ovarian cyst     Pelvic adhesions     Pelvic pain     Prolactin increased 2/12, 11/12    Rectal bleeding     Type II or unspecified type diabetes mellitus without mention of complication, not stated as uncontrolled     Dr. Liset Almodovar Vocal cord polyps     Vomiting     Wellness examination     Dr. Usman Whitaker last seen 5/13/2020     Past Problem List  Patient Active Problem List   Diagnosis Code    Hypercholesteremia E78.00    Panic anxiety syndrome F41.0    DM (diabetes mellitus) ([address] E11.9    IBS (irritable bowel syndrome) K58.9    Mood swings R45.86    Hypothyroidism E03.9    Constipation K59.00    Endometriosis N80.9    Agoraphobia F40.00    HETAL RS with enterocele and Ovarian Preservation 9/23/13 Z90.710    Left ankle pain M25.572    Diarrhea R19.7    Vomiting R11.10    Rectal bleeding K62.5    Midline low back pain without sciatica M54.50    Irritable bowel syndrome without diarrhea K58.9    Gastroesophageal reflux disease without esophagitis K21.9    Abdominal pain, generalized R10.84    Carpal tunnel syndrome on right G56.01    Major depression with psychotic features ([address] F32.3    Bipolar disorder with psychotic features ([address] F31.9    Cocaine use F14.90    Hx of diabetes mellitus Z86.39     SURGICAL HISTORY       Past Surgical History:   Procedure Laterality Date    ABDOMINAL ADHESION SURGERY  9/23/13    CARPAL TUNNEL RELEASE Right 06/24/2020    CARPAL TUNNEL RELEASE Right 6/24/2020    CARPAL TUNNEL RELEASE (SUPINE) 3080 TABLE, ARM TABLE performed by Israel Farias DO [facility]  2/4/14    Benign biopsies    COLONOSCOPY  7-14-15    incomplete/poor prep, hemorrhoids.     ENTEROCELE REPAIR  9/23/13    ESOPHAGOSCOPY  1/22/15    HYSTERECTOMY (CERVIX STATUS UNKNOWN)  9/23/13    HETAL, Right Salpingectomy, Enterocele, SHON    LAPAROSCOPY  2013    Diagnostic converted to [address]       due to cyst- right    TUBAL LIGATION      UPPER GASTROINTESTINAL ENDOSCOPY      WISDOM TOOTH EXTRACTION      x4     CURRENT MEDICATIONS       Discharge Medication List as of 2022  1:30 AM      CONTINUE these medications which have NOT CHANGED    Details   VENTOLIN  (90 Base) MCG/ACT inhaler INHALE 2 PUFFS INTO THE LUNGS EVERY 4 HOURS AS NEEDED FOR WHEEZING, Disp-18 g, R-3Normal      pantoprazole (PROTONIX) 40 MG tablet Take 1 tablet by mouth daily (with breakfast), Disp-30 tablet, R-3RXRD. Refill Due on 22. Queued by [employer] . Normal      albuterol (PROVENTIL) (2.5 MG/3ML) 0.083% nebulizer solution TAKE 3 MLS BY NEBULIZATION EVERY 6 HOURS AS NEEDED FOR WHEEZING, Disp-360 mL, R-3Normal      paliperidone (INVEGA) 6 MG extended release tablet Take 1 tablet by mouth daily, Disp-30 tablet, R-3Normal      budesonide-formoterol (SYMBICORT) 80-4.5 MCG/ACT AERO Inhale 2 puffs into the lungs 2 times daily, Disp-10.2 g, R-3Normal           ALLERGIES     is allergic to codeine, chantix [varenicline tartrate], and magnesium citrate. FAMILY HISTORY     She indicated that her mother is . She indicated that her father is . She indicated that her brother is alive. She indicated that the status of her maternal grandmother is unknown. She indicated that the status of her maternal grandfather is unknown. She indicated that the status of her neg hx is unknown.      SOCIAL HISTORY       Social History     Tobacco Use    Smoking status: Current Every Day Smoker     Packs/day: 1.00     Years: 15.00     Pack years: 15.00     Types: Cigarettes    Smokeless tobacco: Never Used   Vaping Use    Vaping Use: Never used   Substance Use Topics    Alcohol use: No    Drug use: Yes     Types: Marijuana (Weed)     Comment: everyday     PHYSICAL EXAM     INITIAL VITALS: /73   Pulse 84 give meds and reevaluate    Labs reviewed and unremarkable    Patient reevaluated still reporting pain, will check CT given persistent pain    Patient was initially agreeable to CT but however she does not want CT at this time and will [facility] out [facility]    The patient has decided to leave against medical advice and is refusing all further workup and testing. The patient has normal mental status and adequate capacity to make medical decisions and has the capacity to make decisions. The patient refuses hospital admission and wants to be discharged. The risks have been explained to the patient, including worsening illness, chronic pain, permanent disability, loss of organs, and death. The benefits of further testing and admission have also been explained, including the availability and proximity of nurses, physicians, monitoring, diagnostic testing, and treatment. The patient was able to understand and state the risks and benefits of hospital admission. This was witnessed by the nurse and me. The patient had the opportunity to ask questions about medical conditions. The patient was treated to the extent that the patient allowed, and knows that may return for care at any time. Follow up has been discussed patient will see pcp tomorrow. CRITICAL CARE:       PROCEDURES:    Procedures    DIAGNOSTIC RESULTS   EKG:All EKG's are interpreted by the Emergency Department Physician who either signs or Co-signs this chart in the absence of a cardiologist.        RADIOLOGY:All plain film, CT, MRI, and formal ultrasound images (except ED bedside ultrasound) are read by the radiologist, see reports below, unless otherwisenoted in MDM or here. No orders to display     LABS: All lab results were reviewed by myself, and all abnormals are listed below.   Labs Reviewed   CBC WITH AUTO DIFFERENTIAL - Abnormal; Notable for the following components:       Result Value    Seg Neutrophils 73 (*)     Lymphocytes 18 (*)     All other components within normal limits   COMPREHENSIVE METABOLIC PANEL W/ REFLEX TO MG FOR LOW K - Abnormal; Notable for the following components:    Glucose 136 (*)     BUN 22 (*)     CREATININE 0.99 (*)     Calcium 10.7 (*)     All other components within normal limits   URINALYSIS WITH MICROSCOPIC - Abnormal; Notable for the following components:    Bacteria, UA FEW (*)     All other components within normal limits   LIPASE   PREGNANCY, URINE       EMERGENCY DEPARTMENTCOURSE:         Vitals:    Vitals:    07/05/22 2240   BP: 110/73   Pulse: 84   Resp: 16   Temp: 97.8 °F (36.6 °C)   TempSrc: Temporal   SpO2: 98%   Weight: 163 lb (73.9 kg)   Height: 5' 2\" (1.575 m)       The patient was given the following medications while in the emergency department:  Orders Placed This Encounter   Medications    0.9 % sodium chloride bolus    metoclopramide (REGLAN) injection 10 mg    DISCONTD: morphine sulfate (PF) injection 4 mg    famotidine (PEPCID) 20 mg in sodium chloride (PF) 10 mL injection     CONSULTS:  None    FINAL IMPRESSION      1. Generalized abdominal pain          DISPOSITION/PLAN   DISPOSITION [place] 07/06/2022 01:20:45 AM      PATIENT REFERRED TO:  No follow-up provider specified. DISCHARGE MEDICATIONS:  Discharge Medication List as of 7/6/2022  1:30 AM        The care is provided during an unprecedented national emergency due to the novel coronavirus, COVID 19.   [address], DO                    [address], DO  07/06/22 5660

## 2022-07-06 NOTE — ED NOTES
Mode of arrival (squad #, walk in, police, etc) : walk in        Chief complaint(s): abd pain        Arrival Note (brief scenario, treatment PTA, etc). : Pt reports abdominal pain and a sense of need to defecate. Pt reports her PCP will not give her Bentyl or Zantac anymore and she cannot get into her GI doctor. Pt reports some episodes of vomiting as well. C= \"Have you ever felt that you should Cut down on your drinking? \"  No  A= \"Have people Annoyed you by criticizing your drinking? \"  No  G= \"Have you ever felt bad or Guilty about your drinking? \"  No  E= \"Have you ever had a drink as an Eye-opener first thing in the morning to steady your nerves or to help a hangover? \"  No      Deferred []      Reason for deferring: N/A    *If yes to two or more: probable alcohol abuse. Owen Mann RN  07/05/22 1464

## 2022-07-12 ENCOUNTER — TELEPHONE (OUTPATIENT)
Dept: FAMILY MEDICINE CLINIC | Age: 42
End: 2022-07-12

## 2022-07-12 DIAGNOSIS — K58.1 IRRITABLE BOWEL SYNDROME WITH CONSTIPATION: Primary | ICD-10-CM

## 2022-07-12 NOTE — TELEPHONE ENCOUNTER
Ethan from DR. Noel's office called and needs a new referral with with Dr. Trey Wiggins name on it. Writer sent the order to fax number [phone].

## 2022-08-15 ENCOUNTER — HOSPITAL ENCOUNTER (OUTPATIENT)
Dept: NEUROLOGY | Age: 42
Discharge: HOME | End: 2022-08-15
Payer: COMMERCIAL

## 2022-08-15 DIAGNOSIS — G56.02 CARPAL TUNNEL SYNDROME OF LEFT WRIST: ICD-10-CM

## 2022-08-15 PROCEDURE — 95909 NRV CNDJ TST 5-6 STUDIES: CPT | Performed by: PHYSICAL MEDICINE & REHABILITATION

## 2022-08-15 PROCEDURE — 95886 MUSC TEST DONE W/N TEST COMP: CPT | Performed by: PHYSICAL MEDICINE & REHABILITATION

## 2022-08-26 ENCOUNTER — TELEPHONE (OUTPATIENT)
Dept: ORTHOPEDIC SURGERY | Age: 42
End: 2022-08-26

## 2022-08-26 NOTE — TELEPHONE ENCOUNTER
Patient called stating she is experiencing severe wrist pain and swelling. She missed her appointment this week as she had her days mixed up. She is asking for a return from a nurse to discuss. Thank you.

## 2022-09-07 ENCOUNTER — OFFICE VISIT (OUTPATIENT)
Dept: ORTHOPEDIC SURGERY | Age: 42
End: 2022-09-07
Payer: COMMERCIAL

## 2022-09-07 VITALS — BODY MASS INDEX: 30 KG/M2 | HEIGHT: 62 IN | WEIGHT: 163 LBS

## 2022-09-07 DIAGNOSIS — M67.432 GANGLION CYST OF VOLAR ASPECT OF LEFT WRIST: ICD-10-CM

## 2022-09-07 DIAGNOSIS — M65.342 TRIGGER RING FINGER OF LEFT HAND: ICD-10-CM

## 2022-09-07 DIAGNOSIS — M65.332 TRIGGER MIDDLE FINGER OF LEFT HAND: ICD-10-CM

## 2022-09-07 DIAGNOSIS — M65.322 TRIGGER INDEX FINGER OF LEFT HAND: ICD-10-CM

## 2022-09-07 DIAGNOSIS — Z01.818 PRE-OP TESTING: ICD-10-CM

## 2022-09-07 DIAGNOSIS — G56.02 CARPAL TUNNEL SYNDROME OF LEFT WRIST: Primary | ICD-10-CM

## 2022-09-07 PROCEDURE — G8417 CALC BMI ABV UP PARAM F/U: HCPCS | Performed by: STUDENT IN AN ORGANIZED HEALTH CARE EDUCATION/TRAINING PROGRAM

## 2022-09-07 PROCEDURE — G8427 DOCREV CUR MEDS BY ELIG CLIN: HCPCS | Performed by: STUDENT IN AN ORGANIZED HEALTH CARE EDUCATION/TRAINING PROGRAM

## 2022-09-07 PROCEDURE — 99213 OFFICE O/P EST LOW 20 MIN: CPT | Performed by: STUDENT IN AN ORGANIZED HEALTH CARE EDUCATION/TRAINING PROGRAM

## 2022-09-07 PROCEDURE — 4004F PT TOBACCO SCREEN RCVD TLK: CPT | Performed by: STUDENT IN AN ORGANIZED HEALTH CARE EDUCATION/TRAINING PROGRAM

## 2022-09-07 NOTE — PROGRESS NOTES
[facility]  [facility] [address]  Dept: [phone]    Orthopedic Clinic Follow Up      SUBJECTIVE: Benjamin Alas is a 35 y.o. female who presents as a follow up for left carpal tunnel syndrome. She is here for follow-up after EMG studies. EMG demonstrated moderate compression of median nerve at the wrist.  Today patient reports continued numbness tingling to radial 3 digits. Worse at night. Patient has tried NSAIDs as well as night splinting with minimal relief. Patient does not want corticosteroid injections. She wants to talk about surgery. Of note patient also reports volar ganglion cyst has been present for months however recently it popped and has decreased in size and is not causing her any issue. Patient would not like to move forward with surgical excision. Lastly patient also reports pain to the second through fourth volar digits at the level of the A1 pulley. This been present for a few months. Patient does report clicking. Patient has not had any treatment at this time as far splinting or corticosteroid injections. ROS:   Negative except as seen in HPI    OBJECTIVE:  Physical Exam:  General: NAD, sitting comfortably in the room. CV: No dependent edema, regular rate. Pulm: Respirations unlabored and regular. Neuro: Alert. Oriented. Ortho exam:  Left hand: Minimal thenar atrophy. No obvious volar ganglion cyst to wrist region. Positive Tinel's and positive Taylor's. Tenderness to palpation over index, middle, ring A1 pulley region with clicking with fingers in flexion. No locking. Compartments soft. 2+ rad pulse. Median/Radial/Ulnar/AIN/PIN motor intact. Median/Radial/Ulnar nerve SILT decreased sensation intact to light touch to median distribution.     RADIOLOGY:  None    ASSESSMENT:   Left carpal tunnel syndrome  Left index-ring trigger fingers  Left volar wrist ganglion cyst, resolved  Hx right CTR    PLAN:  Benjamin Alas is here for above. EMG results. Reviewed treatment options as far as not op and operative intervention of her left carpal tunnel syndrome. We reviewed the risk/benefits/alternatives. Patient would like to move forward with surgery. As for the trigger fingers, patient would like to try steroid injections at the time of surgery. As for the volar ganglion cyst of the wrist, this has resolved of late. She will let us know if it returns. Consent obtained in office. All questions addressed. F/u two weeks post op.    -No follow-ups on file. No orders of the defined types were placed in this encounter. No orders of the defined types were placed in this encounter. Electronically signed by Nila Maradiaga DO on 9/7/2022 at 8:36 AM    This dictation was generated by voice recognition computer software. Although all attempts are made to edit the dictation for accuracy, there may be errors in the transcription that are not intended. The actual meaning should be extrapolated by the context of the sentence.

## 2022-09-25 ENCOUNTER — APPOINTMENT (OUTPATIENT)
Dept: GENERAL RADIOLOGY | Age: 42
End: 2022-09-25
Payer: COMMERCIAL

## 2022-09-25 ENCOUNTER — HOSPITAL ENCOUNTER (EMERGENCY)
Age: 42
Discharge: HOME | End: 2022-09-25
Attending: EMERGENCY MEDICINE
Payer: COMMERCIAL

## 2022-09-25 VITALS
SYSTOLIC BLOOD PRESSURE: 125 MMHG | BODY MASS INDEX: 30.38 KG/M2 | HEART RATE: 72 BPM | OXYGEN SATURATION: 98 % | WEIGHT: 166.1 LBS | DIASTOLIC BLOOD PRESSURE: 92 MMHG | RESPIRATION RATE: 18 BRPM | TEMPERATURE: 98.4 F

## 2022-09-25 DIAGNOSIS — J06.9 VIRAL URI WITH COUGH: Primary | ICD-10-CM

## 2022-09-25 LAB
S PYO AG THROAT QL: NEGATIVE
SARS-COV-2, RAPID: NOT DETECTED
SOURCE: NORMAL
SPECIMEN DESCRIPTION: NORMAL

## 2022-09-25 PROCEDURE — 87880 STREP A ASSAY W/OPTIC: CPT

## 2022-09-25 PROCEDURE — 71045 X-RAY EXAM CHEST 1 VIEW: CPT

## 2022-09-25 PROCEDURE — 99284 EMERGENCY DEPT VISIT MOD MDM: CPT

## 2022-09-25 PROCEDURE — 6360000002 HC RX W HCPCS: Performed by: NURSE PRACTITIONER

## 2022-09-25 PROCEDURE — 87635 SARS-COV-2 COVID-19 AMP PRB: CPT

## 2022-09-25 RX ORDER — IBUPROFEN 800 MG/1
800 TABLET ORAL EVERY 8 HOURS PRN
Qty: 20 TABLET | Refills: 0 | Status: SHIPPED | OUTPATIENT
Start: 2022-09-25

## 2022-09-25 RX ORDER — BENZONATATE 100 MG/1
100 CAPSULE ORAL 3 TIMES DAILY PRN
Qty: 21 CAPSULE | Refills: 0 | Status: SHIPPED | OUTPATIENT
Start: 2022-09-25 | End: 2022-10-02

## 2022-09-25 RX ORDER — FLUTICASONE PROPIONATE 50 MCG
1 SPRAY, SUSPENSION (ML) NASAL DAILY
Qty: 16 G | Refills: 0 | Status: SHIPPED | OUTPATIENT
Start: 2022-09-25

## 2022-09-25 RX ORDER — DEXAMETHASONE SODIUM PHOSPHATE 10 MG/ML
10 INJECTION, SOLUTION INTRAMUSCULAR; INTRAVENOUS ONCE
Status: COMPLETED | OUTPATIENT
Start: 2022-09-25 | End: 2022-09-25

## 2022-09-25 RX ADMIN — DEXAMETHASONE SODIUM PHOSPHATE 10 MG: 10 INJECTION, SOLUTION INTRAMUSCULAR; INTRAVENOUS at 17:57

## 2022-09-25 ASSESSMENT — ENCOUNTER SYMPTOMS
TROUBLE SWALLOWING: 0
WHEEZING: 0
SORE THROAT: 1
COUGH: 1
SHORTNESS OF BREATH: 0

## 2022-09-25 NOTE — Clinical Note
José Emmanuelle was seen and treated in our emergency department on 9/25/2022. She may return to work on 09/28/2022. If you have any questions or concerns, please don't hesitate to call.       Suzanna Dennis, APRN - CNP

## 2022-09-25 NOTE — ED PROVIDER NOTES
eMERGENCY dEPARTMENT eNCOUnter   Independent Attestation     Pt Name: Fito Trujillo  MRN: [id number]  [place] 1980  Date of evaluation: 9/25/22     Fito Trujillo is a 35 y.o. female with CC: Concern For COVID-19 (Cough, itchy throat.)      This visit was performed by both a physician and an APC. I performed all aspects of the MDM as documented. Based on the medical record the care appears appropriate. I was personally available for consultation in the Emergency Department.     The care is provided during an unprecedented national emergency due to the novel coronavirus, Ruel Warren MD  Attending Emergency Physician                  Mike Whitaker MD  09/25/22 7008

## 2022-09-25 NOTE — ED PROVIDER NOTES
[address] [place] [address] ED  EMERGENCY DEPARTMENT ENCOUNTER      Pt Name: Alysa Araujo  MRN: [id number]  [place] 1980  Date of evaluation: 9/25/2022  Provider: PHILIP Landaverde CNP    CHIEF COMPLAINT       Chief Complaint   Patient presents with    Concern For COVID-19     Cough, itchy throat. HISTORY OFPRESENT ILLNESS  (Location/Symptom, Timing/Onset, Context/Setting, Quality, Duration, Modifying Factors, Severity.)   Alysa Araujo is a 35 y.o. female who presents to the emergency department by private auto for evaluation of sore throat, cough, nasal congestion for the past 3 days. Throat pain is moderate. No difficulty swallowing. No chest pain or shortness of breath. Also complains of body aches, fatigue and chills. Does have a history of COPD. Nursing Notes were reviewed.     PASTMEDICAL HISTORY     Past Medical History:   Diagnosis Date    Agoraphobia     Anxiety     sees Dr. Sinan Trinidad at [facility]    Depression     Diarrhea     Drug abuse, marijuana     Dysmenorrhea     Endometriosis 1/23/2013    new dx    Fibroid     GERD (gastroesophageal reflux disease)     History of nipple discharge     Hyperlipidemia     Hypertension     no longer on meds-anxiety induced HTN    Hypothyroidism     Irritable bowel syndrome     LGSIL (low grade squamous intraepithelial dysplasia) 10/11    Menorrhagia     Midline low back pain without sciatica 1/26/2016    Mood swings     Ovarian cyst     Pelvic adhesions     Pelvic pain     Prolactin increased 2/12, 11/12    Rectal bleeding     Type II or unspecified type diabetes mellitus without mention of complication, not stated as uncontrolled     Dr. Krista Delcid    Vocal cord polyps     Vomiting     Wellness examination     Dr. Cy Ma last seen 5/13/2020         SURGICAL HISTORY       Past Surgical History:   Procedure Laterality Date    ABDOMINAL ADHESION SURGERY  9/23/13    CARPAL TUNNEL RELEASE Right 06/24/2020    CARPAL TUNNEL RELEASE Right 6/24/2020    CARPAL TUNNEL RELEASE (SUPINE) 3080 TABLE, ARM TABLE performed by Lola Mayfield DO at [facility]      COLONOSCOPY  14    Benign biopsies    COLONOSCOPY  7-14-15    incomplete/poor prep, hemorrhoids.     ENTEROCELE REPAIR  13    ESOPHAGOSCOPY  1/22/15    HYSTERECTOMY (CERVIX STATUS UNKNOWN)  13    HETAL, Right Salpingectomy, Enterocele, SHON    LAPAROSCOPY  2013    Diagnostic converted to [address]       due to cyst- right    TUBAL LIGATION      UPPER GASTROINTESTINAL ENDOSCOPY      WISDOM TOOTH EXTRACTION      x4         CURRENT MEDICATIONS     Previous Medications    ALBUTEROL (PROVENTIL) (2.5 MG/3ML) 0.083% NEBULIZER SOLUTION    TAKE 3 MLS BY NEBULIZATION EVERY 6 HOURS AS NEEDED FOR WHEEZING    BUDESONIDE-FORMOTEROL (SYMBICORT) 80-4.5 MCG/ACT AERO    Inhale 2 puffs into the lungs 2 times daily    PALIPERIDONE (INVEGA) 6 MG EXTENDED RELEASE TABLET    Take 1 tablet by mouth daily    PANTOPRAZOLE (PROTONIX) 40 MG TABLET    Take 1 tablet by mouth daily (with breakfast)    VENTOLIN  (90 BASE) MCG/ACT INHALER    INHALE 2 PUFFS INTO THE LUNGS EVERY 4 HOURS AS NEEDED FOR WHEEZING       ALLERGIES     Codeine, Chantix [varenicline tartrate], and Magnesium citrate    FAMILY HISTORY       Family History   Problem Relation Age of Onset    Kidney Disease Mother     Diabetes Mother     Heart Disease Mother     Lung Cancer Maternal Grandmother     Diabetes Maternal Grandmother     Lung Cancer Maternal Grandfather     Diabetes Maternal Grandfather     Breast Cancer Neg Hx     Cancer Neg Hx     Colon Cancer Neg Hx     Eclampsia Neg Hx     Hypertension Neg Hx     Ovarian Cancer Neg Hx      Labor Neg Hx     Spont Abortions Neg Hx     Stroke Neg Hx           SOCIAL HISTORY       Social History     Socioeconomic History    Marital status:    Occupational History    Occupation: unemployeed   Tobacco Use    Smoking status: Every Day     Packs/day: 1.00     Years: 15.00     Pack years: 15.00     Types: Cigarettes    Smokeless tobacco: Never   Vaping Use    Vaping Use: Never used   Substance and Sexual Activity    Alcohol use: No    Drug use: Yes     Types: M[facility])     Comment: everyday    Sexual activity: Yes     Partners: Male     Birth control/protection: Surgical     Comment: HETAL Right Salpingectomy     Social Determinants of Health     Financial Resource Strain: Low Risk     Difficulty of Paying Living Expenses: Not hard at all   Food Insecurity: No Food Insecurity    Worried About [employer] in the Last Year: Never true    Ran Out of Food in the Last Year: Never true         REVIEW OF SYSTEMS    (2-9 systems for level 4, 10 or more for level 5)     Review of Systems   Constitutional:  Positive for activity change, chills and fatigue. Negative for fever. HENT:  Positive for congestion and sore throat. Negative for trouble swallowing. Respiratory:  Positive for cough. Negative for shortness of breath and wheezing. Cardiovascular:  Negative for chest pain. Musculoskeletal:  Positive for myalgias. All other systems reviewed and are negative. Except as noted above the remainder of the review of systems was reviewed and negative. PHYSICAL EXAM    (up to 7 for level 4, 8 or more for level 5)     ED Triage Vitals [09/25/22 1728]   BP Temp Temp src Heart Rate Resp SpO2 Height Weight   (!) 125/92 98.4 °F (36.9 °C) -- 72 18 98 % -- 166 lb 1.6 oz (75.3 kg)       Physical Exam  Constitutional:       General: She is not in acute distress. Appearance: Normal appearance. She is well-developed, well-groomed and normal weight. HENT:      Head: Normocephalic. Right Ear: Hearing, tympanic membrane, ear canal and external ear normal.      Left Ear: Hearing, tympanic membrane, ear canal and external ear normal.      Nose: Congestion present. Mouth/Throat:      Lips: Pink. Pharynx: Uvula midline.  Pharyngeal swelling, oropharyngeal exudate and posterior oropharyngeal erythema present. No uvula swelling. Tonsils: No tonsillar exudate or tonsillar abscesses. 2+ on the right. 2+ on the left. Comments: Pharynx is erythematous with some swelling and exudate. Uvula is midline. No uvula swelling. Tonsils 2+ symmetrically. No tonsillar exudate. Patient tolerating secretions. Eyes:      Conjunctiva/sclera: Conjunctivae normal.   Cardiovascular:      Rate and Rhythm: Normal rate and regular rhythm. Heart sounds: Normal heart sounds. Pulmonary:      Effort: Pulmonary effort is normal.      Breath sounds: Normal breath sounds. Musculoskeletal:         General: Normal range of motion. Cervical back: Normal range of motion. Neurological:      Mental Status: She is alert and oriented to person, place, and time. DIAGNOSTIC RESULTS     EKG:All EKG's are interpreted by the Emergency Department Physician who either signs or Co-signs this chart in the absence of a cardiologist.        RADIOLOGY:   Non-plain film images such as CT, Ultrasound and MRI are read by theradiologist. Plain radiographic images are visualized and preliminarily interpreted by the emergency physician with the below findings:    XR CHEST PORTABLE    Result Date: 9/25/2022  EXAMINATION: ONE XRAY VIEW OF THE CHEST 9/25/2022 6:14 pm COMPARISON: None. HISTORY: Reason for Exam: cough FINDINGS: Heart size is normal. No focal consolidation in the lungs. No pleural effusion or pneumothorax. No acute abnormality. Interpretation per the Radiologist below, if available at the time of this note:    XR CHEST PORTABLE   Final Result   No acute abnormality. EDBEDSIDE ULTRASOUND:   Performed by Sharif Vasques - none    LABS:  Labs Reviewed   STREP SCREEN GROUP A THROAT   COVID-19, RAPID       All other labs were within normal range or not returned as of this dictation.     EMERGENCY DEPARTMENT COURSE andDIFFERENTIAL DIAGNOSIS/MDM:   Strep and COVID screens negative. Chest x-ray no acute abnormality. He is controlling her secretions. I do not suspect peritonsillar abscess. She is afebrile. Lung sounds clear to auscultation. Viral URI. Discussed test results and follow-up care with the patient. Prescription written for symptom management. Vitals:    Vitals:    09/25/22 1728   BP: (!) 125/92   Pulse: 72   Resp: 18   Temp: 98.4 °F (36.9 °C)   SpO2: 98%   Weight: 166 lb 1.6 oz (75.3 kg)         CONSULTS:  None    RES:  Procedures    FINAL IMPRESSION      1.  Viral URI with cough          DISPOSITION/PLAN   DISPOSITION Decision To Discharge 09/25/2022 07:28:04 PM      PATIENT REFERRED TO:   Vallarie Babinski, DO  [address] [place]    In 1 week      [facility]  [address] [facility]  [phone]    If symptoms worsen    DISCHARGE MEDICATIONS:     New Prescriptions    BENZOCAINE-MENTHOL (SORE THROAT LOZENGES) 6-10 MG LOZG LOZENGE    Take 1 lozenge by mouth every 2 hours as needed for Sore Throat    BENZONATATE (TESSALON) 100 MG CAPSULE    Take 1 capsule by mouth 3 times daily as needed for Cough    FLUTICASONE (FLONASE) 50 MCG/ACT NASAL SPRAY    1 spray by Each Nostril route daily    IBUPROFEN (ADVIL;MOTRIN) 800 MG TABLET    Take 1 tablet by mouth every 8 hours as needed for Pain     Electronically signed by PHILIP Younger 9/25/2022 at 7:32 PM           PHILIP Younger CNP  09/25/22 1933

## 2022-09-25 NOTE — DISCHARGE INSTRUCTIONS
Take medications as prescribed. Get plenty of rest.  Follow-up with your doctor in 1 week. Return to emergency department symptoms worsen.

## 2022-10-21 ENCOUNTER — TELEPHONE (OUTPATIENT)
Dept: ORTHOPEDIC SURGERY | Age: 42
End: 2022-10-21

## 2022-10-21 NOTE — TELEPHONE ENCOUNTER
Called patient at all phones numbers and not able to get a hold of her. Patient is scheduled for surgery 11/15 with Dr. Bevin Ahumada and surgery is being denied. Patient can call surgery scheduling to talk to me [phone].

## 2022-10-24 ENCOUNTER — TELEPHONE (OUTPATIENT)
Dept: GASTROENTEROLOGY | Age: 42
End: 2022-10-24

## 2022-10-24 ENCOUNTER — TELEPHONE (OUTPATIENT)
Dept: ORTHOPEDIC SURGERY | Age: 42
End: 2022-10-24

## 2022-10-24 NOTE — TELEPHONE ENCOUNTER
Patient called office stating she used to be seen by Dr Cecily Flanagan and had Dexilant prescribed to her. She states Dr Syed Caldwell is her [facility] Dr now and says that her records do not match Dr Nazario Mendoza records so she cannot get that medication. The only reason Dr Cecily Flanagan prescribed that to her was to get kickbacks from it. Patient is advised that this is prescribed for GERD and that is what she is receive the pantoprazole for from Dr Fernando Vallejo. She states she is aware of that, but it does not work and she wants her medication back. She is advised that she needs to contact Dr Syed Caldwell or Dr Fernando Vallejo to get this medication. She states they will not give it to her and wants to come see Dr Cecily Flanagan so she can get it. Patient is advised that since she has been dismissed from our practice, she cannot be seen by him. Patient then states yelling stating all of the doctors are working together to conspire against her and she wants her medication. Writer ended call.

## 2022-10-24 NOTE — TELEPHONE ENCOUNTER
Patient is asking for a return call from a nurse regarding her wrist. She states she went to bed with her brace on and woke up  with a swollen  and painful wrist. Please return her call at earliest convenience. Thank you.

## 2022-10-24 NOTE — TELEPHONE ENCOUNTER
Attempted to contact patient to schedule an appointment to be evaluated. No answer, voice mail box was full, unable to leave message.

## 2023-01-07 ENCOUNTER — APPOINTMENT (OUTPATIENT)
Dept: CT IMAGING | Age: 43
DRG: 812 | End: 2023-01-07
Payer: MEDICAID

## 2023-01-07 ENCOUNTER — APPOINTMENT (OUTPATIENT)
Dept: GENERAL RADIOLOGY | Age: 43
DRG: 812 | End: 2023-01-07
Payer: MEDICAID

## 2023-01-07 ENCOUNTER — HOSPITAL ENCOUNTER (INPATIENT)
Age: 43
LOS: 3 days | Discharge: HOME | DRG: 812 | End: 2023-01-10
Attending: EMERGENCY MEDICINE | Admitting: STUDENT IN AN ORGANIZED HEALTH CARE EDUCATION/TRAINING PROGRAM
Payer: MEDICAID

## 2023-01-07 DIAGNOSIS — T40.604A OPIATE OVERDOSE, UNDETERMINED INTENT, INITIAL ENCOUNTER (HCC): Primary | ICD-10-CM

## 2023-01-07 DIAGNOSIS — N17.9 AKI (ACUTE KIDNEY INJURY) (HCC): ICD-10-CM

## 2023-01-07 PROBLEM — J96.01 ACUTE RESPIRATORY FAILURE WITH HYPOXIA (HCC): Status: ACTIVE | Noted: 2023-01-07

## 2023-01-07 PROBLEM — R79.89 ELEVATED TROPONIN: Status: ACTIVE | Noted: 2023-01-07

## 2023-01-07 PROBLEM — R41.82 ALTERED MENTAL STATUS, UNSPECIFIED: Status: ACTIVE | Noted: 2023-01-07

## 2023-01-07 PROBLEM — R77.8 ELEVATED TROPONIN: Status: ACTIVE | Noted: 2023-01-07

## 2023-01-07 PROBLEM — M62.82 RHABDOMYOLYSIS: Status: ACTIVE | Noted: 2023-01-07

## 2023-01-07 PROBLEM — T40.601A NARCOTIC OVERDOSE, ACCIDENTAL OR UNINTENTIONAL, INITIAL ENCOUNTER (HCC): Status: ACTIVE | Noted: 2023-01-07

## 2023-01-07 LAB
ABSOLUTE EOS #: 0 K/UL (ref 0–0.4)
ABSOLUTE IMMATURE GRANULOCYTE: 0 K/UL (ref 0–0.3)
ABSOLUTE LYMPH #: 1.6 K/UL (ref 1–4.8)
ABSOLUTE MONO #: 1.34 K/UL (ref 0.1–0.8)
ACETAMINOPHEN LEVEL: <5 UG/ML (ref 10–30)
ALBUMIN SERPL-MCNC: 4.4 G/DL (ref 3.5–5.2)
ALBUMIN/GLOBULIN RATIO: 1.3 (ref 1–2.5)
ALP BLD-CCNC: 220 U/L (ref 35–104)
ALT SERPL-CCNC: 83 U/L (ref 5–33)
AMPHETAMINE SCREEN URINE: NEGATIVE
ANION GAP SERPL CALCULATED.3IONS-SCNC: 18 MMOL/L (ref 9–17)
ANION GAP SERPL CALCULATED.3IONS-SCNC: 25 MMOL/L (ref 9–17)
AST SERPL-CCNC: 152 U/L
BARBITURATE SCREEN URINE: NEGATIVE
BASOPHILS # BLD: 0 % (ref 0–2)
BASOPHILS ABSOLUTE: 0 K/UL (ref 0–0.2)
BENZODIAZEPINE SCREEN, URINE: NEGATIVE
BILIRUB SERPL-MCNC: 0.3 MG/DL (ref 0.3–1.2)
BUN BLDV-MCNC: 31 MG/DL (ref 6–20)
BUN BLDV-MCNC: 49 MG/DL (ref 6–20)
CALCIUM SERPL-MCNC: 8 MG/DL (ref 8.6–10.4)
CALCIUM SERPL-MCNC: 8.9 MG/DL (ref 8.6–10.4)
CANNABINOID SCREEN URINE: POSITIVE
CHLORIDE BLD-SCNC: 101 MMOL/L (ref 98–107)
CHLORIDE BLD-SCNC: 106 MMOL/L (ref 98–107)
CHP ED QC CHECK: YES
CO2: 14 MMOL/L (ref 20–31)
CO2: 17 MMOL/L (ref 20–31)
COCAINE METABOLITE, URINE: POSITIVE
CREAT SERPL-MCNC: 1.71 MG/DL (ref 0.5–0.9)
CREAT SERPL-MCNC: 1.95 MG/DL (ref 0.5–0.9)
EOSINOPHILS RELATIVE PERCENT: 0 % (ref 1–4)
ETHANOL PERCENT: <0.01 %
ETHANOL: <10 MG/DL
FENTANYL URINE: POSITIVE
GFR SERPL CREATININE-BSD FRML MDRD: 32 ML/MIN/1.73M2
GFR SERPL CREATININE-BSD FRML MDRD: 37 ML/MIN/1.73M2
GLUCOSE BLD-MCNC: 102 MG/DL (ref 70–99)
GLUCOSE BLD-MCNC: 105 MG/DL (ref 65–105)
GLUCOSE BLD-MCNC: 106 MG/DL
GLUCOSE BLD-MCNC: 106 MG/DL (ref 65–105)
GLUCOSE BLD-MCNC: 112 MG/DL (ref 70–99)
HCT VFR BLD CALC: 56.5 % (ref 36.3–47.1)
HEMOGLOBIN: 17.2 G/DL (ref 11.9–15.1)
IMMATURE GRANULOCYTES: 0 %
LACTIC ACID, SEPSIS WHOLE BLOOD: 4.2 MMOL/L (ref 0.5–1.9)
LACTIC ACID, SEPSIS WHOLE BLOOD: 4.8 MMOL/L (ref 0.5–1.9)
LACTIC ACID, SEPSIS WHOLE BLOOD: 4.9 MMOL/L (ref 0.5–1.9)
LYMPHOCYTES # BLD: 6 % (ref 24–44)
MAGNESIUM: 2.6 MG/DL (ref 1.6–2.6)
MCH RBC QN AUTO: 29.6 PG (ref 25.2–33.5)
MCHC RBC AUTO-ENTMCNC: 30.4 G/DL (ref 28.4–34.8)
MCV RBC AUTO: 97.1 FL (ref 82.6–102.9)
METHADONE SCREEN, URINE: NEGATIVE
MONOCYTES # BLD: 5 % (ref 1–7)
MORPHOLOGY: NORMAL
MYOGLOBIN: 6930 NG/ML (ref 25–58)
NRBC AUTOMATED: 0.1 PER 100 WBC
OPIATES, URINE: NEGATIVE
OXYCODONE SCREEN URINE: NEGATIVE
PDW BLD-RTO: 13.9 % (ref 11.8–14.4)
PHENCYCLIDINE, URINE: NEGATIVE
PLATELET # BLD: 328 K/UL (ref 138–453)
PMV BLD AUTO: 10.5 FL (ref 8.1–13.5)
POTASSIUM SERPL-SCNC: 5 MMOL/L (ref 3.7–5.3)
POTASSIUM SERPL-SCNC: 5.4 MMOL/L (ref 3.7–5.3)
RBC # BLD: 5.82 M/UL (ref 3.95–5.11)
SALICYLATE LEVEL: <1 MG/DL (ref 3–10)
SEG NEUTROPHILS: 89 % (ref 36–66)
SEGMENTED NEUTROPHILS ABSOLUTE COUNT: 23.76 K/UL (ref 1.8–7.7)
SODIUM BLD-SCNC: 138 MMOL/L (ref 135–144)
SODIUM BLD-SCNC: 143 MMOL/L (ref 135–144)
TEST INFORMATION: ABNORMAL
TOTAL CK: 1512 U/L (ref 26–192)
TOTAL PROTEIN: 7.7 G/DL (ref 6.4–8.3)
TOXIC TRICYCLIC SC,BLOOD: NEGATIVE
TROPONIN, HIGH SENSITIVITY: 110 NG/L (ref 0–14)
TROPONIN, HIGH SENSITIVITY: 125 NG/L (ref 0–14)
WBC # BLD: 26.7 K/UL (ref 3.5–11.3)

## 2023-01-07 PROCEDURE — 36415 COLL VENOUS BLD VENIPUNCTURE: CPT

## 2023-01-07 PROCEDURE — 6360000002 HC RX W HCPCS: Performed by: STUDENT IN AN ORGANIZED HEALTH CARE EDUCATION/TRAINING PROGRAM

## 2023-01-07 PROCEDURE — 96372 THER/PROPH/DIAG INJ SC/IM: CPT

## 2023-01-07 PROCEDURE — 71250 CT THORAX DX C-: CPT

## 2023-01-07 PROCEDURE — 2580000003 HC RX 258

## 2023-01-07 PROCEDURE — 87040 BLOOD CULTURE FOR BACTERIA: CPT

## 2023-01-07 PROCEDURE — 2580000003 HC RX 258: Performed by: STUDENT IN AN ORGANIZED HEALTH CARE EDUCATION/TRAINING PROGRAM

## 2023-01-07 PROCEDURE — 80048 BASIC METABOLIC PNL TOTAL CA: CPT

## 2023-01-07 PROCEDURE — 2060000000 HC ICU INTERMEDIATE R&B

## 2023-01-07 PROCEDURE — 85025 COMPLETE CBC W/AUTO DIFF WBC: CPT

## 2023-01-07 PROCEDURE — 96374 THER/PROPH/DIAG INJ IV PUSH: CPT

## 2023-01-07 PROCEDURE — 96375 TX/PRO/DX INJ NEW DRUG ADDON: CPT

## 2023-01-07 PROCEDURE — 6370000000 HC RX 637 (ALT 250 FOR IP): Performed by: STUDENT IN AN ORGANIZED HEALTH CARE EDUCATION/TRAINING PROGRAM

## 2023-01-07 PROCEDURE — 80143 DRUG ASSAY ACETAMINOPHEN: CPT

## 2023-01-07 PROCEDURE — 93005 ELECTROCARDIOGRAM TRACING: CPT | Performed by: STUDENT IN AN ORGANIZED HEALTH CARE EDUCATION/TRAINING PROGRAM

## 2023-01-07 PROCEDURE — 6360000002 HC RX W HCPCS

## 2023-01-07 PROCEDURE — 71045 X-RAY EXAM CHEST 1 VIEW: CPT

## 2023-01-07 PROCEDURE — 80307 DRUG TEST PRSMV CHEM ANLYZR: CPT

## 2023-01-07 PROCEDURE — 99285 EMERGENCY DEPT VISIT HI MDM: CPT

## 2023-01-07 PROCEDURE — 84484 ASSAY OF TROPONIN QUANT: CPT

## 2023-01-07 PROCEDURE — 82550 ASSAY OF CK (CPK): CPT

## 2023-01-07 PROCEDURE — 83735 ASSAY OF MAGNESIUM: CPT

## 2023-01-07 PROCEDURE — 82805 BLOOD GASES W/O2 SATURATION: CPT

## 2023-01-07 PROCEDURE — 83874 ASSAY OF MYOGLOBIN: CPT

## 2023-01-07 PROCEDURE — 82947 ASSAY GLUCOSE BLOOD QUANT: CPT

## 2023-01-07 PROCEDURE — 80179 DRUG ASSAY SALICYLATE: CPT

## 2023-01-07 PROCEDURE — 99223 1ST HOSP IP/OBS HIGH 75: CPT | Performed by: INTERNAL MEDICINE

## 2023-01-07 PROCEDURE — G0480 DRUG TEST DEF 1-7 CLASSES: HCPCS

## 2023-01-07 PROCEDURE — 83605 ASSAY OF LACTIC ACID: CPT

## 2023-01-07 PROCEDURE — 80053 COMPREHEN METABOLIC PANEL: CPT

## 2023-01-07 RX ORDER — SODIUM CHLORIDE 9 MG/ML
INJECTION, SOLUTION INTRAVENOUS CONTINUOUS
Status: DISCONTINUED | OUTPATIENT
Start: 2023-01-07 | End: 2023-01-08

## 2023-01-07 RX ORDER — HEPARIN SODIUM 5000 [USP'U]/ML
5000 INJECTION, SOLUTION INTRAVENOUS; SUBCUTANEOUS EVERY 8 HOURS SCHEDULED
Status: DISCONTINUED | OUTPATIENT
Start: 2023-01-07 | End: 2023-01-10 | Stop reason: HOSPADM

## 2023-01-07 RX ORDER — SODIUM CHLORIDE 0.9 % (FLUSH) 0.9 %
5-40 SYRINGE (ML) INJECTION EVERY 12 HOURS SCHEDULED
Status: DISCONTINUED | OUTPATIENT
Start: 2023-01-07 | End: 2023-01-10 | Stop reason: HOSPADM

## 2023-01-07 RX ORDER — 0.9 % SODIUM CHLORIDE 0.9 %
20 INTRAVENOUS SOLUTION INTRAVENOUS ONCE
Status: DISCONTINUED | OUTPATIENT
Start: 2023-01-07 | End: 2023-01-10 | Stop reason: HOSPADM

## 2023-01-07 RX ORDER — ACETAMINOPHEN 325 MG/1
650 TABLET ORAL EVERY 6 HOURS PRN
Status: DISCONTINUED | OUTPATIENT
Start: 2023-01-07 | End: 2023-01-10 | Stop reason: HOSPADM

## 2023-01-07 RX ORDER — SODIUM CHLORIDE, SODIUM LACTATE, POTASSIUM CHLORIDE, AND CALCIUM CHLORIDE .6; .31; .03; .02 G/100ML; G/100ML; G/100ML; G/100ML
500 INJECTION, SOLUTION INTRAVENOUS ONCE
Status: COMPLETED | OUTPATIENT
Start: 2023-01-07 | End: 2023-01-08

## 2023-01-07 RX ORDER — PROMETHAZINE HYDROCHLORIDE 25 MG/ML
25 INJECTION, SOLUTION INTRAMUSCULAR; INTRAVENOUS ONCE
Status: COMPLETED | OUTPATIENT
Start: 2023-01-07 | End: 2023-01-07

## 2023-01-07 RX ORDER — SODIUM CHLORIDE 9 MG/ML
INJECTION, SOLUTION INTRAVENOUS PRN
Status: DISCONTINUED | OUTPATIENT
Start: 2023-01-07 | End: 2023-01-10 | Stop reason: HOSPADM

## 2023-01-07 RX ORDER — SODIUM CHLORIDE 0.9 % (FLUSH) 0.9 %
5-40 SYRINGE (ML) INJECTION PRN
Status: DISCONTINUED | OUTPATIENT
Start: 2023-01-07 | End: 2023-01-10 | Stop reason: HOSPADM

## 2023-01-07 RX ORDER — ACETAMINOPHEN 650 MG/1
650 SUPPOSITORY RECTAL EVERY 6 HOURS PRN
Status: DISCONTINUED | OUTPATIENT
Start: 2023-01-07 | End: 2023-01-10 | Stop reason: HOSPADM

## 2023-01-07 RX ORDER — POLYETHYLENE GLYCOL 3350 17 G/17G
17 POWDER, FOR SOLUTION ORAL DAILY PRN
Status: DISCONTINUED | OUTPATIENT
Start: 2023-01-07 | End: 2023-01-10 | Stop reason: HOSPADM

## 2023-01-07 RX ORDER — ONDANSETRON 4 MG/1
4 TABLET, ORALLY DISINTEGRATING ORAL EVERY 8 HOURS PRN
Status: DISCONTINUED | OUTPATIENT
Start: 2023-01-07 | End: 2023-01-10 | Stop reason: HOSPADM

## 2023-01-07 RX ORDER — 0.9 % SODIUM CHLORIDE 0.9 %
1000 INTRAVENOUS SOLUTION INTRAVENOUS ONCE
Status: COMPLETED | OUTPATIENT
Start: 2023-01-07 | End: 2023-01-07

## 2023-01-07 RX ORDER — DEXTROSE MONOHYDRATE 100 MG/ML
INJECTION, SOLUTION INTRAVENOUS CONTINUOUS PRN
Status: DISCONTINUED | OUTPATIENT
Start: 2023-01-07 | End: 2023-01-10 | Stop reason: HOSPADM

## 2023-01-07 RX ORDER — ONDANSETRON 2 MG/ML
4 INJECTION INTRAMUSCULAR; INTRAVENOUS EVERY 6 HOURS PRN
Status: DISCONTINUED | OUTPATIENT
Start: 2023-01-07 | End: 2023-01-10 | Stop reason: HOSPADM

## 2023-01-07 RX ORDER — ONDANSETRON 2 MG/ML
4 INJECTION INTRAMUSCULAR; INTRAVENOUS ONCE
Status: COMPLETED | OUTPATIENT
Start: 2023-01-07 | End: 2023-01-07

## 2023-01-07 RX ORDER — PANTOPRAZOLE SODIUM 40 MG/1
40 TABLET, DELAYED RELEASE ORAL
Status: DISCONTINUED | OUTPATIENT
Start: 2023-01-08 | End: 2023-01-08

## 2023-01-07 RX ORDER — HYDROXYZINE HYDROCHLORIDE 25 MG/1
25 TABLET, FILM COATED ORAL ONCE
Status: COMPLETED | OUTPATIENT
Start: 2023-01-07 | End: 2023-01-07

## 2023-01-07 RX ADMIN — SODIUM CHLORIDE 3000 MG: 900 INJECTION INTRAVENOUS at 13:56

## 2023-01-07 RX ADMIN — PROMETHAZINE HYDROCHLORIDE 25 MG: 25 INJECTION INTRAMUSCULAR; INTRAVENOUS at 13:22

## 2023-01-07 RX ADMIN — SODIUM CHLORIDE: 9 INJECTION, SOLUTION INTRAVENOUS at 17:57

## 2023-01-07 RX ADMIN — ONDANSETRON 4 MG: 2 INJECTION INTRAMUSCULAR; INTRAVENOUS at 23:37

## 2023-01-07 RX ADMIN — HYDROXYZINE HYDROCHLORIDE 25 MG: 25 TABLET, FILM COATED ORAL at 13:12

## 2023-01-07 RX ADMIN — SODIUM CHLORIDE, POTASSIUM CHLORIDE, SODIUM LACTATE AND CALCIUM CHLORIDE 500 ML: 600; 310; 30; 20 INJECTION, SOLUTION INTRAVENOUS at 23:18

## 2023-01-07 RX ADMIN — HEPARIN SODIUM 5000 UNITS: 5000 INJECTION INTRAVENOUS; SUBCUTANEOUS at 16:57

## 2023-01-07 RX ADMIN — SODIUM CHLORIDE, PRESERVATIVE FREE 10 ML: 5 INJECTION INTRAVENOUS at 22:14

## 2023-01-07 RX ADMIN — SODIUM CHLORIDE 1000 ML: 9 INJECTION, SOLUTION INTRAVENOUS at 12:52

## 2023-01-07 RX ADMIN — ONDANSETRON 4 MG: 2 INJECTION INTRAMUSCULAR; INTRAVENOUS at 18:05

## 2023-01-07 RX ADMIN — ONDANSETRON 4 MG: 2 INJECTION INTRAMUSCULAR; INTRAVENOUS at 12:52

## 2023-01-07 ASSESSMENT — ENCOUNTER SYMPTOMS
ABDOMINAL PAIN: 0
VOMITING: 0
SHORTNESS OF BREATH: 0
DIARRHEA: 0
CHEST TIGHTNESS: 0
CHOKING: 0
CONSTIPATION: 0
NAUSEA: 0
COUGH: 1

## 2023-01-07 NOTE — ED NOTES
Hilda Mckeon MD     Pt to room 23 via EMS as drug overdose. Pt reports that she was with a friend and the last thing she remembered today was her friend giving \"her diabetic medication\" and the next thing she knew, she woke up by EMS. Pt was given 2mg narcan IN and 2mg IV and became responsive. Upon arrival, pt is answering questions. Respirations uneven and labored. Pt oxygen saturation was 79% and placed on 4L NC.  Dr. Moreno Alanis and Dr. Konstantin Rodrigues at bedside upon arrival.      Marlene Cardoso RN  01/07/23 0601

## 2023-01-07 NOTE — PROGRESS NOTES
Patient had dark brown foul smelling emesis x 5. Resident notified and new orders in place. Patient continues to be NPO for the time being and is getting agitated about not drinking/eating. Patient educated on types of tests that require nothing to be in the abdomen.

## 2023-01-07 NOTE — ED PROVIDER NOTES
[facility]     Emergency Department     Faculty Attestation    I performed a history and physical examination of the patient and discussed management with the resident. I reviewed the resident´s note and agree with the documented findings and plan of care. Any areas of disagreement are noted on the chart. I was personally present for the key portions of any procedures. I have documented in the chart those procedures where I was not present during the key portions. I have reviewed the emergency nurses triage note. I agree with the chief complaint, past medical history, past surgical history, allergies, medications, social and family history as documented unless otherwise noted below. For Physician Assistant/ Nurse Practitioner cases/documentation I have personally evaluated this patient and have completed at least one if not all key elements of the E/M (history, physical exam, and MDM). Additional findings are as noted. Nursing note:  Pt to room 23 via EMS as drug overdose. Pt reports that she was with a friend and the last thing she remembered today was her friend giving \"her diabetic medication\" and the next thing she knew, she woke up by EMS. Pt was given 2mg narcan IN and 2mg IV and became responsive. Upon arrival, pt is answering questions. Respirations uneven and labored. Pt oxygen saturation was 79% and placed on 4L NC. Dr. Victorina Ashton and Dr. Yanci Joel at bedside upon arrival.   Patient anxious, pupils 1 mm reactive, good airway. Work-up in progress.      Ronald Lala MD  01/07/23 1234    Septic work-up, pneumonia       EKG Interpretation    Interpreted by emergency department physician    Rhythm: normal sinus   Rate: 108  Axis: normal -4  Ectopy: none  Conduction: normal  ST Segments: no acute change  T Waves: no acute change  Q Waves: none    Clinical Impression: Normal EKG    Ronald Lala, III       Ronald Lala MD  01/07/23 1420 Per Dr. Moises Machado, pt was escorted to outpatient registration for a STAT heart cath by myself via wheelchair, accompanied by pt's wife. All orders placed.

## 2023-01-07 NOTE — H&P
Attending Physician Statement  Please refer to resident note for details. I have discussed the case, including pertinent history and exam findings with the resident and the team.  I have seen and examined the patient and the key elements of the encounter have been performed by me. I agree with the assessment, plan and orders as documented by the resident. 20-year-old female who was brought to the hospital after being found down  Patient responded to Narcan. Concern is for drug overdose. We will check tox screen. Monitor oxygen saturations. Monitor troponins. EKG is nonacute per ER physician. Check echocardiogram.  Elevated troponin is most likely from rhabdomyolysis and hypoxia. However given significant elevations we will check echocardiogram and get cardiology evaluation  Patient is in rhabdomyolysis. Start on IV fluids. Monitor intake and output. Get renal ultrasound. Recheck basic metabolic panel and if worsening acidosis, start on bicarb drip  Sitter at bedside.   Psych evaluation once more awake  Follow-up on the cultures  Start on Bruce Phoenix MD  Attending Physician, Internal Medicine Service    Internal Medicine Residency Program  1/7/2023, 10:15 PM

## 2023-01-07 NOTE — ED PROVIDER NOTES
[facility] ED  Emergency Department Encounter  Emergency Medicine Resident     Pt Cr Potter  MRN: [id number]  [place] 1980  Date of evaluation: 1/7/23  PCP:  Anselmo Smith DO      CHIEF COMPLAINT       Chief Complaint   Patient presents with    Drug Overdose       HISTORY OF PRESENT ILLNESS  (Location/Symptom, Timing/Onset, Context/Setting, Quality, Duration, Modifying Factors, Severity.)      Tracey Mora is a 38 y.o. female who presents after being found down. Past medical history seen for type 2 diabetes and hypothyroidism. Patient was found down on top of significant other who was in rigor mortis. Narcan was given to patient 2 mg IV, 2 mg IM with improvement in mentation. Patient adamantly denying that she took anything states evening she remembers was point to cook dinner last night. Patient complaining of anxiety and confused as to where she is at. PAST MEDICAL / SURGICAL / SOCIAL / FAMILY HISTORY      has a past medical history of Agoraphobia, Anxiety, Depression, Diarrhea, Drug abuse, marijuana, Dysmenorrhea, Endometriosis, Fibroid, GERD (gastroesophageal reflux disease), History of nipple discharge, Hyperlipidemia, Hypertension, Hypothyroidism, Irritable bowel syndrome, LGSIL (low grade squamous intraepithelial dysplasia), Menorrhagia, Midline low back pain without sciatica, Mood swings, Ovarian cyst, Pelvic adhesions, Pelvic pain, Prolactin increased, Rectal bleeding, Type II or unspecified type diabetes mellitus without mention of complication, not stated as uncontrolled, Vocal cord polyps, Vomiting, and Wellness examination. has a past surgical history that includes Cholecystectomy; Tubal ligation; [place] tooth extraction; Ovary removal; laparoscopy (1/23/2013); Hysterectomy (9/23/13); Enterocele repair (9/23/13); Abdominal adhesion surgery (9/23/13); Upper gastrointestinal endoscopy; Esophagoscopy (1/22/15); Colonoscopy (2/4/14);  Colonoscopy (7--15); Carpal tunnel release (Right, 2020); and Carpal tunnel release (Right, 2020).       Social History     Socioeconomic History    Marital status:      Spouse name: Not on file    Number of children: Not on file    Years of education: Not on file    Highest education level: Not on file   Occupational History    Occupation: unemployeed   Tobacco Use    Smoking status: Every Day     Packs/day: 1.00     Years: 15.00     Pack years: 15.00     Types: Cigarettes    Smokeless tobacco: Never   Vaping Use    Vaping Use: Never used   Substance and Sexual Activity    Alcohol use: No    Drug use: Yes     Types: Marijuana Daril Josep)     Comment: everyday    Sexual activity: Yes     Partners: Male     Birth control/protection: Surgical     Comment: HETAL Right Salpingectomy   Other Topics Concern    Not on file   Social History Narrative    Not on file     Social Determinants of Health     Financial Resource Strain: Low Risk     Difficulty of Paying Living Expenses: Not hard at all   Food Insecurity: No Food Insecurity    Worried About Running Out of Food in the Last Year: Never true    Ran Out of Food in the Last Year: Never true   Transportation Needs: Not on file   Physical Activity: Not on file   Stress: Not on file   Social Connections: Not on file   Intimate Partner Violence: Not on file   Housing Stability: Not on file       Family History   Problem Relation Age of Onset    Kidney Disease Mother     Diabetes Mother     Heart Disease Mother     Lung Cancer Maternal Grandmother     Diabetes Maternal Grandmother     Lung Cancer Maternal Grandfather     Diabetes Maternal Grandfather     Breast Cancer Neg Hx     Cancer Neg Hx     Colon Cancer Neg Hx     Eclampsia Neg Hx     Hypertension Neg Hx     Ovarian Cancer Neg Hx      Labor Neg Hx     Spont Abortions Neg Hx     Stroke Neg Hx        Allergies:  Codeine, Chantix [varenicline tartrate], and Magnesium citrate    Home Medications:  Prior to Admission medications    Medication Sig Start Date End Date Taking? Authorizing Provider   VENTOLIN  (90 Base) MCG/ACT inhaler INHALE 2 PUFFS INTO THE LUNGS EVERY 4 HOURS AS NEEDED FOR WHEEZING 12/6/22   Grace Marquez DO   ibuprofen (ADVIL;MOTRIN) 800 MG tablet Take 1 tablet by mouth every 8 hours as needed for Pain 9/25/22   Elsare Ortiz, APRN - CNP   Benzocaine-Menthol (SORE THROAT LOZENGES) 6-10 MG LOZG lozenge Take 1 lozenge by mouth every 2 hours as needed for Sore Throat 9/25/22   Elsare Ortiz, APRN - CNP   fluticasone [facility]) 50 MCG/ACT nasal spray 1 spray by Each Nostril route daily 9/25/22   Elsare Ortiz, APRN - CNP   pantoprazole (PROTONIX) 40 MG tablet Take 1 tablet by mouth daily (with breakfast) 5/17/22   Grace Marquez DO   albuterol (PROVENTIL) (2.5 MG/3ML) 0.083% nebulizer solution TAKE 3 MLS BY NEBULIZATION EVERY 6 HOURS AS NEEDED FOR WHEEZING 9/16/21   Grace Marquez DO   paliperidone (INVEGA) 6 MG extended release tablet Take 1 tablet by mouth daily  Patient not taking: Reported on 6/27/2022 9/3/21   Abbey Clark MD   budesonide-formoterol (SYMBICORT) 80-4.5 MCG/ACT AERO Inhale 2 puffs into the lungs 2 times daily 9/2/21   Abbey Clark MD       REVIEW OF SYSTEMS    (2-9 systems for level 4, 10 or more for level 5)      Review of Systems   Unable to perform ROS: Acuity of condition     PHYSICAL EXAM   (up to 7 for level 4, 8 or more for level 5)      INITIAL VITALS:   /66   Pulse (!) 108   Temp (!) 92.8 °F (33.8 °C) (Rectal)   Resp 22   Wt 167 lb (75.8 kg)   LMP 05/30/2013   SpO2 96%   BMI 30.54 kg/m²     Physical Exam  Constitutional:       General: She is not in acute distress. Appearance: She is not ill-appearing, toxic-appearing or diaphoretic. Comments: Patient shivering uncontrollably, mentation appropriate, dried emesis around mouth, no focal neurologic deficits   HENT:      Head: Normocephalic and atraumatic. Eyes:      Extraocular Movements: Extraocular movements intact. Pupils: Pupils are equal, round, and reactive to light. Cardiovascular:      Rate and Rhythm: Regular rhythm. Tachycardia present. Heart sounds: No murmur heard. No friction rub. No gallop. Pulmonary:      Effort: No respiratory distress. Breath sounds: No stridor. No wheezing, rhonchi or rales. Chest:      Chest wall: No tenderness. Abdominal:      General: There is no distension. Palpations: There is no mass. Tenderness: There is no abdominal tenderness. There is no guarding or rebound. Hernia: No hernia is present. Musculoskeletal:         General: No swelling, tenderness, deformity or signs of injury. Right lower leg: No edema. Left lower leg: No edema. Skin:     Capillary Refill: Capillary refill takes less than 2 seconds. Coloration: Skin is not jaundiced or pale. Findings: No bruising, erythema, lesion or rash. Neurological:      Cranial Nerves: No cranial nerve deficit. Sensory: No sensory deficit. Motor: No weakness.       Coordination: Coordination normal.      Comments: ANO x2, no focal neurologic deficits       DIFFERENTIAL  DIAGNOSIS     PLAN (Rita Espino / Mikhail Brannon / EKG):  Orders Placed This Encounter   Procedures    Culture, Blood 1    Culture, Blood 1    XR CHEST PORTABLE    CBC with Auto Differential    Comprehensive Metabolic Panel    Magnesium    TOX SCR, BLD, ED    Troponin    CK    Myoglobin, Blood    Lactate, Sepsis    Telemetry monitoring - 72 hour duration    Inpatient consult to Internal Medicine    POCT glucose    POC Glucose Fingerstick    EKG 12 Lead    ADMIT TO INPATIENT       MEDICATIONS ORDERED:  Orders Placed This Encounter   Medications    ondansetron (ZOFRAN) injection 4 mg    0.9 % sodium chloride bolus    hydrOXYzine HCl (ATARAX) tablet 25 mg    promethazine (PHENERGAN) injection 25 mg    ampicillin-sulbactam (UNASYN) 3,000 mg in sodium chloride 0.9 % 100 mL IVPB (mini-bag)     Order Specific Question: Antimicrobial Indications     Answer:   Pneumonia (CAP)     Order Specific Question:   CAP duration of therapy     Answer:   5 days     Order Specific Question:   Suspected Organism(s)     Answer:   aspiration pneumonia    0.9 % sodium chloride bolus       DDX: Drug overdose, rhabdomyolysis, electrolyte abnormality, FUNMILAYO, aspiration pneumonia    DIAGNOSTIC RESULTS / EMERGENCY DEPARTMENT COURSE / MDM   LAB RESULTS:  Results for orders placed or performed during the hospital encounter of 01/07/23   CBC with Auto Differential   Result Value Ref Range    WBC 26.7 (H) 3.5 - 11.3 k/uL    RBC 5.82 (H) 3.95 - 5.11 m/uL    Hemoglobin 17.2 (H) 11.9 - 15.1 g/dL    Hematocrit 56.5 (H) 36.3 - 47.1 %    MCV 97.1 82.6 - 102.9 fL    MCH 29.6 25.2 - 33.5 pg    MCHC 30.4 28.4 - 34.8 g/dL    RDW 13.9 11.8 - 14.4 %    Platelets 764 584 - 673 k/uL    MPV 10.5 8.1 - 13.5 fL    NRBC Automated 0.1 (H) 0.0 per 100 WBC    Immature Granulocytes 0 0 %    Seg Neutrophils 89 (H) 36 - 66 %    Lymphocytes 6 (L) 24 - 44 %    Monocytes 5 1 - 7 %    Eosinophils % 0 (L) 1 - 4 %    Basophils 0 0 - 2 %    Absolute Immature Granulocyte 0.00 0.00 - 0.30 k/uL    Segs Absolute 23.76 (H) 1.8 - 7.7 k/uL    Absolute Lymph # 1.60 1.0 - 4.8 k/uL    Absolute Mono # 1.34 (H) 0.1 - 0.8 k/uL    Absolute Eos # 0.00 0.0 - 0.4 k/uL    Basophils Absolute 0.00 0.0 - 0.2 k/uL    Morphology Normal    Comprehensive Metabolic Panel   Result Value Ref Range    Glucose 102 (H) 70 - 99 mg/dL    BUN 31 (H) 6 - 20 mg/dL    Creatinine 1.71 (H) 0.50 - 0.90 mg/dL    Est, Glom Filt Rate 37 (L) >60 mL/min/1.73m2    Calcium 8.9 8.6 - 10.4 mg/dL    Sodium 143 135 - 144 mmol/L    Potassium 5.0 3.7 - 5.3 mmol/L    Chloride 101 98 - 107 mmol/L    CO2 17 (L) 20 - 31 mmol/L    Anion Gap 25 (H) 9 - 17 mmol/L    Alkaline Phosphatase 220 (H) 35 - 104 U/L    ALT 83 (H) 5 - 33 U/L     (H) <32 U/L    Total Bilirubin 0.3 0.3 - 1.2 mg/dL    Total Protein 7.7 6.4 - 8.3 g/dL    Albumin 4.4 3.5 - 5.2 g/dL    Albumin/Globulin Ratio 1.3 1.0 - 2.5   Magnesium   Result Value Ref Range    Magnesium 2.6 1.6 - 2.6 mg/dL   TOX SCR, BLD, ED   Result Value Ref Range    Acetaminophen Level <5 (L) 10 - 30 ug/mL    Ethanol <10 <10 mg/dL    Ethanol percent <0.289 <6.423 %    Salicylate Lvl <1 (L) 3 - 10 mg/dL    Toxic Tricyclic Sc,Blood NEGATIVE NEGATIVE   Troponin   Result Value Ref Range    Troponin, High Sensitivity 125 (HH) 0 - 14 ng/L   Troponin   Result Value Ref Range    Troponin, High Sensitivity 110 (HH) 0 - 14 ng/L   CK   Result Value Ref Range    Total CK 1,512 (H) 26 - 192 U/L   Myoglobin, Blood   Result Value Ref Range    Myoglobin 6,930 (H) 25 - 58 ng/mL   Lactate, Sepsis   Result Value Ref Range    Lactic Acid, Sepsis, Whole Blood 4.8 (H) 0.5 - 1.9 mmol/L   POCT glucose   Result Value Ref Range    Glucose 106 mg/dL    QC OK? yes    POC Glucose Fingerstick   Result Value Ref Range    POC Glucose 106 (H) 65 - 105 mg/dL       IMPRESSION: Significant elevation in patient's white blood cell count as well as troponin. Findings of FUNMILAYO as well as rhabdomyolysis.     RADIOLOGY:  XR CHEST PORTABLE   Final Result   Diffuse left lung infiltrate could represent pneumonia               EKG  Sinus tachycardia, rate 108, normal axis, intervals within normal limits, no acute ST elevation noted, incomplete right bundle branch block, numbness of EKG    All EKG's are interpreted by the Emergency Department Physician who either signs or Co-signs this chart in the absence of a cardiologist.    EMERGENCY DEPARTMENT COURSE:      Sepsis Times and Checklist  Vital Signs: BP: 109/66  Heart Rate: (!) 108  Resp: 22  Temp: (!) 92.8 °F (33.8 °C) SpO2: 96 %  SIRS (>2) Non Pregnant       Temp > 38.3C or < 36C   HR > 90   RR > 20   WBC > 12 or < 4 or >10% bands  SIRS (>2) Pregnant 20 weeks until 3 days postpartum   Temp > 38C or <36C   HR >110   RR > 24   WBC >15 or < 4 or >10% bands   SIRS (>2) and confirmed or suspected source of infection = Sepsis  Is Sepsis due to likely bacterial infection?: Yes        Sepsis Identified:  Date: 1/7/23   Time: 1345  Sepsis Orders:   CBC(required): Yes   CMP: Yes   PT/PTT: No   Blood Cultures x2(required): Yes   Urinalysis and Urine Culture: No   Lactate(required): Yes   Antibiotics Given (within 3 hours of sepsis identification, after blood cultures):  Yes    (If unable to obtain IV access for IV antibiotics within 3 hours of identification of sepsis, IM antibiotics is acceptable.)              If lactate >2.0 MUST repeat within 6 hours        IV Fluid Bolus:  Is lactate > 4.0:  Yes  If lactate >  4.0 OR hypotension (MAP<65 mmHg,BP<90 or SBP<85 pregnancy 20 weeks to 3 days  postpartum) (with 2 BP readings) 30 ml/kg crystalloid MUST be ordered. Medical Decision Making  25-year-old female found down on top of significant other. Was given Narcan with resolution of mentation. Patient denies any history of opiate abuse. On exam patient tachycardic and cold. Found to be hypothermic given 1 L of warm saline as well as placed on Melia hugger. Tachycardia and symptoms improving. Patient found to have aspiration pneumonia as well as sepsis therefore 30 cc/kg bolus ordered given lactic greater than 4. No findings of hypotension. Patient denying any chest pain. EKG showing no ischemic changes however troponin is elevated likely secondary to hypoxia. Patient also found to have new onset FUNMILAYO and rhabdomyolysis likely secondary to prolonged downtime. Patient given Unasyn for aspiration pneumonia and admitted to internal medicine for further management. Amount and/or Complexity of Data Reviewed  Independent Historian: EMS  External Data Reviewed: labs and notes. Labs: ordered. Decision-making details documented in ED Course. Details: CBC, CMP, lactic, blood culture, troponin, ED tox  Radiology: ordered. Details: Chest x-ray  ECG/medicine tests: ordered.      Details: ECG  Discussion of management or test interpretation with external provider(s): Internal medicine    Risk  Prescription drug management. Decision regarding hospitalization. ED Course as of 01/07/23 1505   Sat Jan 07, 2023   1239 Patient placed on nonrebreather. Oxygen saturations appear to be in the low 80s however this is a poor waveform. Will place on nonrebreather and reevaluate. Potential admission. [MS]   1325 Patient has leukocytosis as well as chest x-ray showing left lower lobe pneumonia. Will obtain cultures and give Unasyn for aspiration pneumonia. We will plan on admission. [MS]   1403 Lactic Acid, Sepsis, Whole Blood(!): 4.8 [MS]   1407 Lactic acid greater than 4 we will order 1.5 L that will top off the patient's 30 cc/kg bolus. [MS]      ED Course User Index  [MS] Gui Harrison DO       No notes of [facility] Admission Criteria type on file. PROCEDURES:  N/a    CONSULTS:  IP CONSULT TO INTERNAL MEDICINE    CRITICAL CARE:  35 min    FINAL IMPRESSION      1. Opiate overdose, undetermined intent, initial encounter ([facility] [address]          [address] / [address] Admitted 01/07/2023 02:20:17 PM      PATIENT REFERRED TO:  No follow-up provider specified.     DISCHARGE MEDICATIONS:  New Prescriptions    No medications on file       Lashay Cuadra DO  Emergency Medicine Resident    (Please note that portions of thisnote were completed with a voice recognition program.  Efforts were made to edit the dictations but occasionally words are mis-transcribed.)        Gui Harrison DO  Resident  01/07/23 1505

## 2023-01-07 NOTE — ED NOTES
Unable to obtain EKG d/t shivering at this time   Dr. Flores notified      Jose Roberto Noriega RN  01/07/23 2614

## 2023-01-08 ENCOUNTER — APPOINTMENT (OUTPATIENT)
Dept: CT IMAGING | Age: 43
DRG: 812 | End: 2023-01-08
Payer: MEDICAID

## 2023-01-08 ENCOUNTER — APPOINTMENT (OUTPATIENT)
Dept: ULTRASOUND IMAGING | Age: 43
DRG: 812 | End: 2023-01-08
Payer: MEDICAID

## 2023-01-08 PROBLEM — K22.89 ESOPHAGEAL DILATATION: Status: ACTIVE | Noted: 2023-01-08

## 2023-01-08 PROBLEM — E87.20 METABOLIC ACIDOSIS: Status: ACTIVE | Noted: 2023-01-08

## 2023-01-08 PROBLEM — E87.5 HYPERKALEMIA: Status: ACTIVE | Noted: 2023-01-08

## 2023-01-08 PROBLEM — R41.82 ALTERED MENTAL STATUS, UNSPECIFIED: Status: RESOLVED | Noted: 2023-01-07 | Resolved: 2023-01-08

## 2023-01-08 PROBLEM — F14.10 COCAINE ABUSE (HCC): Status: ACTIVE | Noted: 2023-01-08

## 2023-01-08 PROBLEM — I95.9 ARTERIAL HYPOTENSION: Status: ACTIVE | Noted: 2023-01-08

## 2023-01-08 PROBLEM — F19.10 POLYSUBSTANCE ABUSE (HCC): Status: ACTIVE | Noted: 2023-01-08

## 2023-01-08 LAB
ABSOLUTE EOS #: 0.42 K/UL (ref 0–0.44)
ABSOLUTE IMMATURE GRANULOCYTE: 0.11 K/UL (ref 0–0.3)
ABSOLUTE LYMPH #: 0.75 K/UL (ref 1.1–3.7)
ABSOLUTE MONO #: 0.67 K/UL (ref 0.1–1.2)
ANION GAP SERPL CALCULATED.3IONS-SCNC: 14 MMOL/L (ref 9–17)
BASOPHILS # BLD: 0 % (ref 0–2)
BASOPHILS ABSOLUTE: 0.07 K/UL (ref 0–0.2)
BUN BLDV-MCNC: 58 MG/DL (ref 6–20)
CALCIUM SERPL-MCNC: 8.1 MG/DL (ref 8.6–10.4)
CHLORIDE BLD-SCNC: 108 MMOL/L (ref 98–107)
CO2: 15 MMOL/L (ref 20–31)
COMPLEMENT C3: 90 MG/DL (ref 90–180)
COMPLEMENT C4: 25 MG/DL (ref 10–40)
CREAT SERPL-MCNC: 1.72 MG/DL (ref 0.5–0.9)
EOSINOPHILS RELATIVE PERCENT: 2 % (ref 1–4)
ESTIMATED AVERAGE GLUCOSE: 131 MG/DL
FREE KAPPA/LAMBDA RATIO: 0.84 (ref 0.26–1.65)
GFR SERPL CREATININE-BSD FRML MDRD: 37 ML/MIN/1.73M2
GLUCOSE BLD-MCNC: 113 MG/DL (ref 65–105)
GLUCOSE BLD-MCNC: 137 MG/DL (ref 65–105)
GLUCOSE BLD-MCNC: 137 MG/DL (ref 70–99)
GLUCOSE BLD-MCNC: 139 MG/DL (ref 65–105)
HAV IGM SER IA-ACNC: NONREACTIVE
HBA1C MFR BLD: 6.2 % (ref 4–6)
HCT VFR BLD CALC: 40.1 % (ref 36.3–47.1)
HEMOGLOBIN: 12.8 G/DL (ref 11.9–15.1)
HEPATITIS B CORE IGM ANTIBODY: NONREACTIVE
HEPATITIS B SURFACE ANTIGEN: NONREACTIVE
HEPATITIS C ANTIBODY: NONREACTIVE
IMMATURE GRANULOCYTES: 1 %
KAPPA FREE LIGHT CHAINS QNT: 1.13 MG/DL (ref 0.37–1.94)
LACTIC ACID, SEPSIS WHOLE BLOOD: 1.6 MMOL/L (ref 0.5–1.9)
LACTIC ACID, SEPSIS WHOLE BLOOD: 2 MMOL/L (ref 0.5–1.9)
LACTIC ACID, SEPSIS WHOLE BLOOD: 2.9 MMOL/L (ref 0.5–1.9)
LAMBDA FREE LIGHT CHAINS QNT: 1.35 MG/DL (ref 0.57–2.63)
LYMPHOCYTES # BLD: 4 % (ref 24–43)
MCH RBC QN AUTO: 29.8 PG (ref 25.2–33.5)
MCHC RBC AUTO-ENTMCNC: 31.9 G/DL (ref 28.4–34.8)
MCV RBC AUTO: 93.3 FL (ref 82.6–102.9)
MONOCYTES # BLD: 4 % (ref 3–12)
MYOGLOBIN: 2356 NG/ML (ref 25–58)
NRBC AUTOMATED: 0 PER 100 WBC
PDW BLD-RTO: 14.4 % (ref 11.8–14.4)
PLATELET # BLD: 169 K/UL (ref 138–453)
PMV BLD AUTO: 11.1 FL (ref 8.1–13.5)
POTASSIUM SERPL-SCNC: 4.8 MMOL/L (ref 3.7–5.3)
POTASSIUM SERPL-SCNC: 5.1 MMOL/L (ref 3.7–5.3)
RBC # BLD: 4.3 M/UL (ref 3.95–5.11)
SEG NEUTROPHILS: 89 % (ref 36–65)
SEGMENTED NEUTROPHILS ABSOLUTE COUNT: 16.82 K/UL (ref 1.5–8.1)
SODIUM BLD-SCNC: 137 MMOL/L (ref 135–144)
TOTAL CK: ABNORMAL U/L (ref 26–192)
TROPONIN, HIGH SENSITIVITY: 104 NG/L (ref 0–14)
TROPONIN, HIGH SENSITIVITY: 68 NG/L (ref 0–14)
WBC # BLD: 18.8 K/UL (ref 3.5–11.3)

## 2023-01-08 PROCEDURE — 83874 ASSAY OF MYOGLOBIN: CPT

## 2023-01-08 PROCEDURE — 84132 ASSAY OF SERUM POTASSIUM: CPT

## 2023-01-08 PROCEDURE — 82436 ASSAY OF URINE CHLORIDE: CPT

## 2023-01-08 PROCEDURE — 2580000003 HC RX 258

## 2023-01-08 PROCEDURE — 2580000003 HC RX 258: Performed by: INTERNAL MEDICINE

## 2023-01-08 PROCEDURE — 74176 CT ABD & PELVIS W/O CONTRAST: CPT

## 2023-01-08 PROCEDURE — 83605 ASSAY OF LACTIC ACID: CPT

## 2023-01-08 PROCEDURE — 99254 IP/OBS CNSLTJ NEW/EST MOD 60: CPT | Performed by: INTERNAL MEDICINE

## 2023-01-08 PROCEDURE — 84156 ASSAY OF PROTEIN URINE: CPT

## 2023-01-08 PROCEDURE — 84166 PROTEIN E-PHORESIS/URINE/CSF: CPT

## 2023-01-08 PROCEDURE — 6360000002 HC RX W HCPCS: Performed by: STUDENT IN AN ORGANIZED HEALTH CARE EDUCATION/TRAINING PROGRAM

## 2023-01-08 PROCEDURE — 86160 COMPLEMENT ANTIGEN: CPT

## 2023-01-08 PROCEDURE — 82550 ASSAY OF CK (CPK): CPT

## 2023-01-08 PROCEDURE — 86225 DNA ANTIBODY NATIVE: CPT

## 2023-01-08 PROCEDURE — 84155 ASSAY OF PROTEIN SERUM: CPT

## 2023-01-08 PROCEDURE — 99232 SBSQ HOSP IP/OBS MODERATE 35: CPT | Performed by: INTERNAL MEDICINE

## 2023-01-08 PROCEDURE — 84484 ASSAY OF TROPONIN QUANT: CPT

## 2023-01-08 PROCEDURE — 85025 COMPLETE CBC W/AUTO DIFF WBC: CPT

## 2023-01-08 PROCEDURE — 87070 CULTURE OTHR SPECIMN AEROBIC: CPT

## 2023-01-08 PROCEDURE — 87205 SMEAR GRAM STAIN: CPT

## 2023-01-08 PROCEDURE — C9113 INJ PANTOPRAZOLE SODIUM, VIA: HCPCS | Performed by: INTERNAL MEDICINE

## 2023-01-08 PROCEDURE — 83521 IG LIGHT CHAINS FREE EACH: CPT

## 2023-01-08 PROCEDURE — 97162 PT EVAL MOD COMPLEX 30 MIN: CPT

## 2023-01-08 PROCEDURE — 2580000003 HC RX 258: Performed by: STUDENT IN AN ORGANIZED HEALTH CARE EDUCATION/TRAINING PROGRAM

## 2023-01-08 PROCEDURE — 6360000002 HC RX W HCPCS

## 2023-01-08 PROCEDURE — 36415 COLL VENOUS BLD VENIPUNCTURE: CPT

## 2023-01-08 PROCEDURE — 83036 HEMOGLOBIN GLYCOSYLATED A1C: CPT

## 2023-01-08 PROCEDURE — 97530 THERAPEUTIC ACTIVITIES: CPT

## 2023-01-08 PROCEDURE — 6360000002 HC RX W HCPCS: Performed by: INTERNAL MEDICINE

## 2023-01-08 PROCEDURE — 80074 ACUTE HEPATITIS PANEL: CPT

## 2023-01-08 PROCEDURE — 84165 PROTEIN E-PHORESIS SERUM: CPT

## 2023-01-08 PROCEDURE — C9113 INJ PANTOPRAZOLE SODIUM, VIA: HCPCS

## 2023-01-08 PROCEDURE — 82947 ASSAY GLUCOSE BLOOD QUANT: CPT

## 2023-01-08 PROCEDURE — 86334 IMMUNOFIX E-PHORESIS SERUM: CPT

## 2023-01-08 PROCEDURE — 99223 1ST HOSP IP/OBS HIGH 75: CPT | Performed by: INTERNAL MEDICINE

## 2023-01-08 PROCEDURE — 82570 ASSAY OF URINE CREATININE: CPT

## 2023-01-08 PROCEDURE — 80048 BASIC METABOLIC PNL TOTAL CA: CPT

## 2023-01-08 PROCEDURE — 86038 ANTINUCLEAR ANTIBODIES: CPT

## 2023-01-08 PROCEDURE — 76770 US EXAM ABDO BACK WALL COMP: CPT

## 2023-01-08 PROCEDURE — 2060000000 HC ICU INTERMEDIATE R&B

## 2023-01-08 PROCEDURE — 84300 ASSAY OF URINE SODIUM: CPT

## 2023-01-08 PROCEDURE — 2500000003 HC RX 250 WO HCPCS: Performed by: INTERNAL MEDICINE

## 2023-01-08 PROCEDURE — 93005 ELECTROCARDIOGRAM TRACING: CPT

## 2023-01-08 RX ORDER — 0.9 % SODIUM CHLORIDE 0.9 %
1000 INTRAVENOUS SOLUTION INTRAVENOUS ONCE
Status: COMPLETED | OUTPATIENT
Start: 2023-01-08 | End: 2023-01-08

## 2023-01-08 RX ORDER — 0.9 % SODIUM CHLORIDE 0.9 %
500 INTRAVENOUS SOLUTION INTRAVENOUS ONCE
Status: COMPLETED | OUTPATIENT
Start: 2023-01-08 | End: 2023-01-08

## 2023-01-08 RX ORDER — CALCIUM GLUCONATE 20 MG/ML
2000 INJECTION, SOLUTION INTRAVENOUS ONCE
Status: COMPLETED | OUTPATIENT
Start: 2023-01-08 | End: 2023-01-08

## 2023-01-08 RX ORDER — FENTANYL CITRATE 50 UG/ML
25 INJECTION, SOLUTION INTRAMUSCULAR; INTRAVENOUS ONCE
Status: DISCONTINUED | OUTPATIENT
Start: 2023-01-08 | End: 2023-01-08

## 2023-01-08 RX ORDER — METOCLOPRAMIDE HYDROCHLORIDE 5 MG/ML
10 INJECTION INTRAMUSCULAR; INTRAVENOUS EVERY 6 HOURS
Status: DISCONTINUED | OUTPATIENT
Start: 2023-01-08 | End: 2023-01-09

## 2023-01-08 RX ADMIN — METOCLOPRAMIDE 10 MG: 5 INJECTION, SOLUTION INTRAMUSCULAR; INTRAVENOUS at 18:56

## 2023-01-08 RX ADMIN — PIPERACILLIN AND TAZOBACTAM 3375 MG: 3; .375 INJECTION, POWDER, FOR SOLUTION INTRAVENOUS at 14:13

## 2023-01-08 RX ADMIN — SODIUM CHLORIDE 1000 ML: 9 INJECTION, SOLUTION INTRAVENOUS at 04:24

## 2023-01-08 RX ADMIN — SODIUM CHLORIDE: 9 INJECTION, SOLUTION INTRAVENOUS at 11:44

## 2023-01-08 RX ADMIN — SODIUM CHLORIDE 500 ML: 9 INJECTION, SOLUTION INTRAVENOUS at 09:56

## 2023-01-08 RX ADMIN — SODIUM CHLORIDE, PRESERVATIVE FREE 40 MG: 5 INJECTION INTRAVENOUS at 20:55

## 2023-01-08 RX ADMIN — CALCIUM GLUCONATE 2000 MG: 20 INJECTION, SOLUTION INTRAVENOUS at 06:12

## 2023-01-08 RX ADMIN — PIPERACILLIN AND TAZOBACTAM 3375 MG: 3; .375 INJECTION, POWDER, FOR SOLUTION INTRAVENOUS at 08:11

## 2023-01-08 RX ADMIN — SODIUM CHLORIDE: 9 INJECTION, SOLUTION INTRAVENOUS at 08:11

## 2023-01-08 RX ADMIN — SODIUM CHLORIDE: 9 INJECTION, SOLUTION INTRAVENOUS at 14:12

## 2023-01-08 RX ADMIN — METOCLOPRAMIDE 10 MG: 5 INJECTION, SOLUTION INTRAMUSCULAR; INTRAVENOUS at 14:11

## 2023-01-08 RX ADMIN — SODIUM CHLORIDE, PRESERVATIVE FREE 40 MG: 5 INJECTION INTRAVENOUS at 08:14

## 2023-01-08 RX ADMIN — SODIUM CHLORIDE, PRESERVATIVE FREE 10 ML: 5 INJECTION INTRAVENOUS at 08:14

## 2023-01-08 RX ADMIN — PIPERACILLIN AND TAZOBACTAM 3375 MG: 3; .375 INJECTION, POWDER, FOR SOLUTION INTRAVENOUS at 21:01

## 2023-01-08 RX ADMIN — SODIUM BICARBONATE: 84 INJECTION, SOLUTION INTRAVENOUS at 14:17

## 2023-01-08 RX ADMIN — SODIUM BICARBONATE: 84 INJECTION, SOLUTION INTRAVENOUS at 22:06

## 2023-01-08 ASSESSMENT — PAIN SCALES - GENERAL: PAINLEVEL_OUTOF10: 4

## 2023-01-08 ASSESSMENT — PAIN DESCRIPTION - LOCATION: LOCATION: LEG

## 2023-01-08 NOTE — CONSULTS
Attestation signed by         Conrado Galvan Cardiology Cardiology    Consult / H&P               Today's Date: 1/8/2023  Patient Name: Raissa aMrt  Date of admission: 1/7/2023 12:27 PM  Patient's age: 38 y.o., 1980  Admission Dx: Narcotic overdose, accidental or unintentional, initial encounter ([facility] [address] [T40.601A]  Opiate overdose, undetermined intent, initial encounter ([facility] [address] [T40.604A]  Altered mental status, unspecified [R41.82]    Reason for Consult:  Cardiac evaluation    Requesting Physician: Ximena Cevallos MD    CHIEF COMPLAINT:   Found unresponsive    History Obtained From:  patient, electronic medical record    HISTORY OF PRESENT ILLNESS:      The patient is a 38 y.o.  female who is admitted to the hospital for management of possible drug overdose    patient was brought to the hospital after being found down, according to chart review patient did not respond to Narcan by EMS. Admitted due to concerns of drug overdose  On arrival to the ED was hypoxic  Lab work showed rhabdomyolysis, FUNMILAYO  Troponin elevated 125> 110> 104  Urine drug screen positive for cannabinoid, cocaine, fentanyl  No significant past cardiac history      Past Medical History:   has a past medical history of Agoraphobia, Anxiety, Depression, Diarrhea, Drug abuse, marijuana, Dysmenorrhea, Endometriosis, Fibroid, GERD (gastroesophageal reflux disease), History of nipple discharge, Hyperlipidemia, Hypertension, Hypothyroidism, Irritable bowel syndrome, LGSIL (low grade squamous intraepithelial dysplasia), Menorrhagia, Midline low back pain without sciatica, Mood swings, Ovarian cyst, Pelvic adhesions, Pelvic pain, Prolactin increased, Rectal bleeding, Type II or unspecified type diabetes mellitus without mention of complication, not stated as uncontrolled, Vocal cord polyps, Vomiting, and Wellness examination. Past Surgical History:   has a past surgical history that includes Cholecystectomy;  Tubal ligation; [place] tooth extraction; Ovary removal; laparoscopy (1/23/2013); Hysterectomy (9/23/13); Enterocele repair (9/23/13); Abdominal adhesion surgery (9/23/13); Upper gastrointestinal endoscopy; Esophagoscopy (1/22/15); Colonoscopy (2/4/14); Colonoscopy (7-14-15); Carpal tunnel release (Right, 06/24/2020); and Carpal tunnel release (Right, 6/24/2020). Home Medications:    Prior to Admission medications    Medication Sig Start Date End Date Taking? Authorizing Provider   VENTOLIN  (90 Base) MCG/ACT inhaler INHALE 2 PUFFS INTO THE LUNGS EVERY 4 HOURS AS NEEDED FOR WHEEZING 12/6/22   Grace Marquez DO   ibuprofen (ADVIL;MOTRIN) 800 MG tablet Take 1 tablet by mouth every 8 hours as needed for Pain 9/25/22   PHILIP Mujica CNP   Benzocaine-Menthol (SORE THROAT LOZENGES) 6-10 MG LOZG lozenge Take 1 lozenge by mouth every 2 hours as needed for Sore Throat 9/25/22   PHILIP Mujica CNP   fluticasone [facility]) 50 MCG/ACT nasal spray 1 spray by Each Nostril route daily 9/25/22   PHILIP Mujica CNP   pantoprazole (PROTONIX) 40 MG tablet Take 1 tablet by mouth daily (with breakfast) 5/17/22   Grace Marquez DO   albuterol (PROVENTIL) (2.5 MG/3ML) 0.083% nebulizer solution TAKE 3 MLS BY NEBULIZATION EVERY 6 HOURS AS NEEDED FOR WHEEZING 9/16/21   Grace Marquez DO   paliperidone (INVEGA) 6 MG extended release tablet Take 1 tablet by mouth daily  Patient not taking: Reported on 6/27/2022 9/3/21   Dolly Youssef MD   budesonide-formoterol (SYMBICORT) 80-4.5 MCG/ACT AERO Inhale 2 puffs into the lungs 2 times daily 9/2/21   Dolly Youssef MD       Allergies:  Codeine, Chantix [varenicline tartrate], and Magnesium citrate    Social History:   reports that she has been smoking cigarettes. She has a 15.00 pack-year smoking history. She has never used smokeless tobacco. She reports current drug use. Drug: Marijuana Laveda [place]). She reports that she does not drink alcohol.      Family History: family history includes Diabetes in her maternal grandfather, maternal grandmother, and mother; Heart Disease in her mother; Kidney Disease in her mother; Daniel Vickers in her maternal grandfather and maternal grandmother. REVIEW OF SYSTEMS:    Constitutional: Denies any   eyes: No visual changes or diplopia. No scleral icterus. ENT: No Headaches  Cardiovascular: Denies any chest pain or shortness of breath or palpitations  Respiratory: No previous pulmonary problems, No cough  Gastrointestinal: No abdominal pain. No change in bowel or bladder habits. Genitourinary: No dysuria, trouble voiding, or hematuria. Musculoskeletal:  No gait disturbance, No weakness or joint complaints. Integumentary: No rash or pruritis. Neurological: Denies any      PHYSICAL EXAM:      BP 99/68   Pulse (!) 115   Temp 98.4 °F (36.9 °C) (Oral)   Resp 26   Wt 167 lb (75.8 kg)   LMP 05/30/2013   SpO2 92%   BMI 30.54 kg/m²    Constitutional and General Appearance: alert, cooperative, no distress and appears stated age  [de-identified]: PERRL  Respiratory:  Clear to auscultation  Cardiovascular:  Normal heart sounds  Abdomen:   Soft  Extremities:   Lower extremity edema: No  Neurological:  Alert and oriented. Moves all extremities well    DATA:    Diagnostics:    EKG: sinus tachycardia. ECHO: ordered, but not yet obtained. Stress Test: not obtained. Cardiac Angiography: not obtained. Labs:     CBC:   Recent Labs     01/07/23  1245   WBC 26.7*   HGB 17.2*   HCT 56.5*        BMP:   Recent Labs     01/07/23  1245 01/07/23  2314 01/08/23  0119    138  --    K 5.0 5.4* 5.1   CO2 17* 14*  --    BUN 31* 49*  --    CREATININE 1.71* 1.95*  --    LABGLOM 37* 32*  --    GLUCOSE 102* 112*  --      BNP: No results for input(s): BNP in the last 72 hours. PT/INR: No results for input(s): PROTIME, INR in the last 72 hours. APTT:No results for input(s): APTT in the last 72 hours.   CARDIAC ENZYMES:  Recent Labs     01/07/23  1245   CKTOTAL 1,512*     FASTING LIPID PANEL:  Lab Results   Component Value Date/Time    HDL 37 09/01/2021 06:52 AM    LDLDIRECT 57 10/22/2012 08:50 AM    TRIG 164 09/01/2021 06:52 AM     LIVER PROFILE:  Recent Labs     01/07/23  1245   *   ALT 83*   LABALBU 4.4       IMPRESSION:    Patient Active Problem List   Diagnosis    Hypercholesteremia    Panic anxiety syndrome    DM (diabetes mellitus) (HCC)    IBS (irritable bowel syndrome)    Mood swings    Hypothyroidism    Constipation    Endometriosis    Agoraphobia    HETAL RS with enterocele and Ovarian Preservation 9/23/13    Left ankle pain    Diarrhea    Vomiting    Rectal bleeding    Midline low back pain without sciatica    Irritable bowel syndrome without diarrhea    Gastroesophageal reflux disease     Abdominal pain, generalized    Carpal tunnel syndrome on right    Major depression with psychotic features ([address]    Bipolar disorder with psychotic features ([address]    Cocaine use    Hx of diabetes mellitus    Narcotic overdose, accidental or unintentional, initial encounter ([address]    Acute respiratory failure with hypoxia ([address]    Elevated troponin    Rhabdomyolysis    Acute kidney injury ([address]    Hyperkalemia    Esophageal dilatation       RECOMMENDATIONS:  Elevated troponin  Substance abuse  FUNMILAYO  Rhabdomyolysis    - Follow-up on echocardiogram  - Further recommendations to follow      Discussed with patient    Electronically signed by Mack Nam MD on 1/8/2023 at 6:49 AM    Tacos Anderson  PGY-2  Internal Medicine  [facility]   1:29 PM 1/8/2023        Attending Physician Statement  I have discussed the care of Noy Bender, including pertinent history and exam findings,  with the cardiology fellow/resident. I have seen and examined the patient and the key elements of all parts of the encounter have been performed by me. I have completed at least one if not all key elements of the E/M (history, physical exam, and MDM).  I agree with the assessment, plan and orders as documented by the resident with additional recommendations as below:     Polysub abuse. Rhabdomyolysis and FUNMILAYO. ECG- sinus tcah without any ischemic changes. Mildly Elevated troponins secondary to above. Will obtain echo to assess LVEF.      Giovanni Pinto MD, [facility]

## 2023-01-08 NOTE — CONSULTS
[facility]    GASTROENTEROLOGY CONSULT    Patient:   Courtney Allen   :    1980   Facility:   [facility]   Date:    2023  Admission Dx:  Narcotic overdose, accidental or unintentional, initial encounter (HCC) [T40.601A]  Opiate overdose, undetermined intent, initial encounter (HCC) [T40.604A]  Altered mental status, unspecified [R41.82]  Requesting physician: Dania Preciado MD  Reason for consult:  Esophageal dilation   CC : Patient found down    SUBJECTIVE     HISTORY OF PRESENT ILLNESS  This is a 42 y.o.  female who was admitted 2023 with Narcotic overdose, accidental or unintentional, initial encounter (HCC) [T40.601A]  Opiate overdose, undetermined intent, initial encounter (HCC) [T40.604A]  Altered mental status, unspecified [R41.82]. We have been asked to see the patient in consultation by Dania Preciado MD for esophageal dilation    42-year-old female with a PMH of anxiety, depression, drug abuse, GERD, T2DM, menorrhagia, hypertension, who presented to the hospital after being found down.  Downtime unknown.  Patient responded to Narcan.  Work-up in the ED found drug screen was positive for cocaine, fentanyl, and cannabinoids.   Additional labs consistent with rhabdomyolysis with myoglobin 6930, CK 1000 512 and 14,416. Troponin level 125.  CTA chest showed patchy left lung pneumonia and fluid-filled esophagus with marked dilation prompting GI consult for esophageal dilation. Patient admitted for narcotic overdose.     Patient reports she was cooking supper with her significant other and the next thing she realized she was in the hospital.  She does not recall any events.    Patient reports a long history of GERD/dyspepsia type symptoms.  She is currently following with Dr. Noel.  She states undergoing EGD and colonoscopy 2022.  EGD suggested reflux and was placed on Dexilant for which her symptoms are controlled.       She also reports a history of eating disorder in which she will fast for long periods of time. She does admit to early satiety. He was experiencing nausea and vomiting prior to starting Dexilant. Last episode of vomiting approximately 2 -3 weeks ago. She has never been diagnosed with gastroparesis. Has never been on Reglan that she recalls. However, EGD completed 2015 by Dr. Liliana Zavala showed Dunn's esophagus with recommendations for Reglan. It is unclear if she ever started it. Patient reports her diabetes is controlled through diet and exercise. Last hemoglobin A1c 6.0 2/11/2021 at [facility]        Summary of imaging completed at this time:  CT A/P  Impression   1. Moderately dilated and fluid-filled distal esophagus and stomach,   slightly improved when compared to the previous CT chest dated 01/07/2023. There is no abnormal small bowel distention or evidence of obstructing   gastric mass, and findings may be related to gastroparesis. 2.  Unchanged patchy airspace consolidation at the visualized lung bases. 3.  Hepatic steatosis. Summary of labs completed at this time:     Latest Reference Range & Units 1/7/23 12:45 1/7/23 13:58 1/7/23 23:14 1/8/23 10:33   Troponin, High Sensitivity 0 - 14 ng/L 125 (HH) 110 (HH) 104 (HH) 68 (HH)   ([facility]): Data is critically high     Latest Reference Range & Units 1/7/23 12:45 1/8/23 10:33     Total CK 26 - 192 U/L 1,512 (H) 14,416 (H)   (H): Data is abnormally high     Latest Reference Range & Units 1/7/23 12:45 1/8/23 10:33   Myoglobin 25 - 58 ng/mL 6,930 (H) 2,356 (H)   (H): Data is abnormally high      Previous GI history:     EGD and colonoscopy by Dr. Simon Diego October 2022. EGD per patient showed reflux disease.   Reports unavailable      COLONOSCOPY 2015 -     PREOPERATIVE DIAGNOSIS:   rectal pain and bleeding   diarrhea     Findings:     Solid stool up to 40 cm, aborted the exam      Descending/Sigmoid colon: abnormal: solid stool, cant clean, aborted , no mass up to 40 cm      Rectum/Anus: examined in normal and retroflexed positions and was abnormal: external hemorrhoids      Electronically signed by Miguelina Driscoll MD  on 7/14/2015 at 10:49 AM      EGD     PREOPERATIVE DIAGNOSIS:   Intractable GERD     Diagnosis:  Dunn's esophagus , short less than 1 cm, bxs taken jar#2  Gastritis, bxs taken jar#1     Recommendations/Plan:   F/U Biopsies  F/U In Office in 3-4 weeks  Discussed with the family  Will add reglan     Electronically signed by Miguelina Driscoll MD  on 1/22/2015 at 12:36 PM      OBJECTIVE:     PAST MEDICAL/SURGICAL HISTORY  Past Medical History:   Diagnosis Date    Agoraphobia     Anxiety     sees Dr. Rosa Givens at [facility]    Depression     Diarrhea     Drug abuse, marijuana     Dysmenorrhea     Endometriosis 1/23/2013    new dx    Fibroid     GERD (gastroesophageal reflux disease)     History of nipple discharge     Hyperlipidemia     Hypertension     no longer on meds-anxiety induced HTN    Hypothyroidism     Irritable bowel syndrome     LGSIL (low grade squamous intraepithelial dysplasia) 10/11    Menorrhagia     Midline low back pain without sciatica 1/26/2016    Mood swings     Ovarian cyst     Pelvic adhesions     Pelvic pain     Prolactin increased 2/12, 11/12    Rectal bleeding     Type II or unspecified type diabetes mellitus without mention of complication, not stated as uncontrolled     Dr. Elkin Murrieta    Vocal cord polyps     Vomiting     Wellness examination     Dr. Parvin Whyte last seen 5/13/2020     Past Surgical History:   Procedure Laterality Date    ABDOMINAL ADHESION SURGERY  9/23/13    CARPAL TUNNEL RELEASE Right 06/24/2020    CARPAL TUNNEL RELEASE Right 6/24/2020    CARPAL TUNNEL RELEASE (SUPINE) 3080 TABLE, ARM TABLE performed by Danni Lopez DO at [facility]. 12.  2/4/14    Benign biopsies    COLONOSCOPY  7-14-15    incomplete/poor prep, hemorrhoids.     ENTEROCELE REPAIR  9/23/13    ESOPHAGOSCOPY  1/22/15 HYSTERECTOMY (CERVIX STATUS UNKNOWN)  9/23/13    HETAL, Right Salpingectomy, Enterocele, SHON    LAPAROSCOPY  1/23/2013    Diagnostic converted to [address]       due to cyst- right    TUBAL LIGATION      UPPER GASTROINTESTINAL ENDOSCOPY      WISDOM TOOTH EXTRACTION      x4       ALLERGIES:  Allergies   Allergen Reactions    Codeine     Chantix [Varenicline Tartrate] Rash    Magnesium Citrate Nausea And Vomiting       HOME MEDICATIONS:  Prior to Admission medications    Medication Sig Start Date End Date Taking?  Authorizing Provider   VENTOLIN  (90 Base) MCG/ACT inhaler INHALE 2 PUFFS INTO THE LUNGS EVERY 4 HOURS AS NEEDED FOR WHEEZING 12/6/22   Grace Marquez DO   ibuprofen (ADVIL;MOTRIN) 800 MG tablet Take 1 tablet by mouth every 8 hours as needed for Pain 9/25/22   PHILIP Eubanks CNP   Benzocaine-Menthol (SORE THROAT LOZENGES) 6-10 MG LOZG lozenge Take 1 lozenge by mouth every 2 hours as needed for Sore Throat 9/25/22   PHILIP Eubanks CNP   fluticasone Ermsusannah Salcedo) 50 MCG/ACT nasal spray 1 spray by Each Nostril route daily 9/25/22   PHILIP Eubanks CNP   pantoprazole (PROTONIX) 40 MG tablet Take 1 tablet by mouth daily (with breakfast) 5/17/22   Grace Marquez DO   albuterol (PROVENTIL) (2.5 MG/3ML) 0.083% nebulizer solution TAKE 3 MLS BY NEBULIZATION EVERY 6 HOURS AS NEEDED FOR WHEEZING 9/16/21   Grace Marquez DO   paliperidone (INVEGA) 6 MG extended release tablet Take 1 tablet by mouth daily  Patient not taking: Reported on 6/27/2022 9/3/21   Gabriella Monk MD   budesonide-formoterol (SYMBICORT) 80-4.5 MCG/ACT AERO Inhale 2 puffs into the lungs 2 times daily 9/2/21   Gabriella Monk MD       CURRENT MEDICATIONS:  Scheduled Meds:   piperacillin-tazobactam  3,375 mg IntraVENous Q8H    pantoprazole (PROTONIX) 40 mg injection  40 mg IntraVENous Daily    sodium chloride  20 mL/kg IntraVENous Once    sodium chloride flush  5-40 mL IntraVENous 2 times per day    [Held by provider] heparin (porcine)  5,000 Units SubCUTAneous 3 times per day    insulin regular  10 Units IntraVENous Once    And    dextrose bolus  250 mL IntraVENous Once     Continuous Infusions:   sodium chloride 10 mL/hr at 23 0811    sodium chloride 100 mL/hr at 23 1144    dextrose       PRN Meds:sodium chloride flush, sodium chloride, ondansetron **OR** ondansetron, polyethylene glycol, acetaminophen **OR** acetaminophen, glucose, dextrose bolus **OR** dextrose bolus, glucagon (rDNA), dextrose    SOCIAL HISTORY:     Tobacco:   reports that she has been smoking cigarettes. She has a 15.00 pack-year smoking history. She has never used smokeless tobacco.  Alcohol:   reports no history of alcohol use. Illicit drugs:  reports current drug use. Drug: Marijuana [place]). FAMILY HISTORY:     Family History   Problem Relation Age of Onset    Kidney Disease Mother     Diabetes Mother     Heart Disease Mother     Lung Cancer Maternal Grandmother     Diabetes Maternal Grandmother     Lung Cancer Maternal Grandfather     Diabetes Maternal Grandfather     Breast Cancer Neg Hx     Cancer Neg Hx     Colon Cancer Neg Hx     Eclampsia Neg Hx     Hypertension Neg Hx     Ovarian Cancer Neg Hx      Labor Neg Hx     Spont Abortions Neg Hx     Stroke Neg Hx        REVIEW OF SYSTEMS:    Constitutional: No fever, no chills, no lethargy, no weakness. + Eating disorder  HEENT:  No headache, otalgia, itchy eyes, nasal discharge or sore throat. Cardiac:  No chest pain, dyspnea, orthopnea or PND. Chest:   No cough, phlegm or wheezing. Abdomen:  No abdominal pain, + nausea / vomiting last time 2 to 3 weeks ago, + early satiety. Neuro:  No focal weakness, abnormal movements or seizure like activity. Skin:   No rashes, no itching. :   No hematuria, no pyuria, no dysuria, no flank pain. Extremities:  No swelling or joint pains. ROS was otherwise negative except as mentioned in the [address]      PHYSICAL EXAM:    /70   Pulse (!) 105   Temp 98.6 °F (37 °C) (Oral)   Resp 23   Ht 5' 2\" (1.575 m)   Wt 164 lb (74.4 kg)   LMP 05/30/2013   SpO2 94%   BMI 30.00 kg/m²     GENERAL:   Well developed, Well nourished, anxious and restless but no apparent distress  HEAD:   Normocephalic, Atraumatic, abrasion on left upper eyelid  EENT:   EOMI, Sclera not icteric, Oropharynx moist   NECK:   Supple, Trachea midline  LUNGS:  CTA Bilaterally  HEART:  RRR, No murmur  ABDOMEN:   Soft, obese, nontender, Nondistended, BS WNL  EXT:   No clubbing. No cyanosis. No edema. SKIN:   No rashes. No jaundice. No stigmata of liver disease. MUSC/SKEL:   Adequate muscle bulk for patient's age, No significant synovitis, No deformities  NEURO:  A&O x Three, CN II- XII grossly intact      LABS AND IMAGING:     CBC  Recent Labs     01/07/23  1245 01/08/23  1033   WBC 26.7* 18.8*   HGB 17.2* 12.8   HCT 56.5* 40.1   MCV 97.1 93.3   MCH 29.6 29.8   MCHC 30.4 31.9    169       IMMATURE PLTs  No results found for: PLTFLUORE    BMP  Recent Labs     01/07/23  1245 01/07/23  2314 01/08/23  0119 01/08/23  1033    138  --  137   K 5.0 5.4* 5.1 4.8    106  --  108*   CO2 17* 14*  --  15*   BUN 31* 49*  --  58*   CREATININE 1.71* 1.95*  --  1.72*   GLUCOSE 102* 112*  --  137*   CALCIUM 8.9 8.0*  --  8.1*       LFTS  Recent Labs     01/07/23  1245   ALKPHOS 220*   BILITOT 0.3   *   ALT 83*   PROT 7.7   LABALBU 4.4       AMYLASE/LIPASE/AMMONIA  No results for input(s): AMYLASE, LIPASE, AMMONIA in the last 72 hours. PT/INR  No results for input(s): PROTIME, INR in the last 72 hours. ANEMIA STUDIES  No results for input(s): IRON, LABIRON, TIBC, UIBC, FERRITIN, [id number], FOLATE, OCCULTBLD in the last 72 hours.       LIVER WORK UP:    Acute Hepatitis Panel   Lab Results   Component Value Date/Time    HEPBSAG NONREACTIVE 05/19/2016 11:54 AM    HEPCAB NONREACTIVE 05/19/2016 11:54 AM    HEPBIGM NONREACTIVE 05/19/2016 11:54 AM       HCV Genotype   No results found for: HEPATITISCGENOTYPE    HCV Quantitative   No results found for: HCVQNT    AFP  Lab Results   Component Value Date/Time    AFP 1.4 05/19/2016 11:54 AM       Alpha 1 antitrypsin   No results found for: A1A    Anti - Liver/Kidney Ab  No results found for: LIVER-KIDNEYMICROSOMALAB    ADRIEL  Lab Results   Component Value Date/Time    ADRIEL NEGATIVE 05/19/2016 11:54 AM       AMA  Lab Results   Component Value Date/Time    MITOAB 1.7 05/19/2016 11:54 AM       ASMA  Lab Results   Component Value Date/Time    SMOOTHMUSCAB 4 05/19/2016 11:54 AM       Ceruloplasmin  No results found for: CERULOPLSM    Celiac panel  No results found for: TISSTRNTIIGG, TTGIGA, IGA    IgG  No results found for: IGG    IgM  No results found for: IGM    GGT   No results found for: LABGGT    PT/INR  No results for input(s): PROTIME, INR in the last 72 hours. Cancer Markers:  CEA:  No results found for: CEA  Ca 125:  No results found for:   Ca 19-9:   No results found for:   AFP:   Lab Results   Component Value Date/Time    AFP 1.4 05/19/2016 11:54 AM       Lactic acid:No results for input(s): LACTACIDWB in the last 72 hours. IMAGING  CT ABDOMEN PELVIS WO CONTRAST Additional Contrast? None    Result Date: 1/8/2023  EXAMINATION: CT OF THE ABDOMEN AND PELVIS WITHOUT CONTRAST 1/8/2023 8:46 am TECHNIQUE: CT of the abdomen and pelvis was performed without the administration of intravenous contrast. Multiplanar reformatted images are provided for review. Automated exposure control, iterative reconstruction, and/or weight based adjustment of the mA/kV was utilized to reduce the radiation dose to as low as reasonably achievable. COMPARISON: CT chest dated 01/07/2023. CT abdomen and pelvis dated 01/15/2020.  HISTORY: ORDERING SYSTEM PROVIDED HISTORY: esophagus stomach dilatation TECHNOLOGIST PROVIDED HISTORY: Esophagus stomach dilatation Is the patient pregnant?->No Reason for Exam: STOMACH ESOPHAGUS DILATATION FINDINGS: Lower Chest: Heart is normal in size. No pericardial effusion. Distal esophagus is again noted to be fluid-filled and dilated, although improved when compared to 01/07/2023. Patchy airspace consolidation at the visualized lung bases is not significantly changed from 01/07/2023. No pleural effusion. Organs: Liver is diffusely hypoattenuating. Gallbladder is surgically absent. Spleen, pancreas, and adrenal glands have a grossly unremarkable unenhanced appearance. Kidneys are symmetric in size and attenuation. No renal or ureteral calculus. No hydronephrosis or perinephric inflammation. GI/Bowel: Stomach is moderately distended and fluid-filled, improved when compared to 01/07/2023. No abnormal small bowel distention. There is scattered colonic diverticulosis. No focal pericolonic inflammation. No free air or ascites. Appendix is normal. Pelvis: Urinary bladder is within normal limits. Uterus appears to be surgically absent. Peritoneum/Retroperitoneum: The abdominal aorta is normal in caliber. There is no retroperitoneal or mesenteric lymphadenopathy. Bones/Soft Tissues: There is no acute or suspicious osseous abnormality. Visualized superficial soft tissues are within normal limits. 1.  Moderately dilated and fluid-filled distal esophagus and stomach, slightly improved when compared to the previous CT chest dated 01/07/2023. There is no abnormal small bowel distention or evidence of obstructing gastric mass, and findings may be related to gastroparesis. 2.  Unchanged patchy airspace consolidation at the visualized lung bases. 3.  Hepatic steatosis. CT CHEST WO CONTRAST    Result Date: 1/8/2023  EXAMINATION: CT OF THE CHEST WITHOUT CONTRAST 1/7/2023 10:41 pm TECHNIQUE: CT of the chest was performed without the administration of intravenous contrast. Multiplanar reformatted images are provided for review.  Automated exposure control, iterative reconstruction, and/or weight based adjustment of the mA/kV was utilized to reduce the radiation dose to as low as reasonably achievable. COMPARISON: Portable chest obtained approximately 9 hours earlier. HISTORY: ORDERING SYSTEM PROVIDED HISTORY: MULTIFOCAL pna TECHNOLOGIST PROVIDED HISTORY: MULTIFOCAL pna Is the patient pregnant?->No Reason for Exam: MULTIFOCAL pna FINDINGS: Mediastinum: The esophagus is fluid-filled and markedly dilated measuring 5.5 cm diameter distally. At the level of the distal trachea, it measures 3.3 x 2.1 cm. No evidence of mediastinal, hilar or axillary lymphadenopathy. Aorta is normal in caliber without acute abnormality. The central airways are clear. A calcified subcarinal lymph node is noted. Lungs/pleura: Patchy airspace opacity is present throughout the left lung and to a minimal degree on the right. A partially calcified granuloma is present posteriorly in the right lower lobe inferiorly. No pneumothorax or pleural effusion. Upper Abdomen: Limited imaging of the upper abdomen discloses moderately to markedly dilated stomach, which is entirely fluid-filled. The proximal most portion of the duodenum is partially visualized and may also be dilated. Multiple calcified splenic granulomas are noted. Soft Tissues/Bones: No acute bone or soft tissue abnormality evident. Diffuse patchy left lung pneumonia with minimal involvement on the right. The esophagus is fluid-filled and markedly dilated measuring 5.5 cm distally. Stomach has a similar appearance. This may extend into the partially visualized proximal most duodenum. Follow-up postcontrast CT abdomen/pelvis recommended for further evaluation. Evidence of old granulomatous disease. US RENAL COMPLETE    Result Date: 1/8/2023  EXAM: US Retroperitoneal Complete, Renal EXAM DATE/TIME: 1/8/2023 9:04 am CLINICAL HISTORY: ORDERING SYSTEM PROVIDED  FUNMILAYO  TECHNOLOGIST PROVIDED HISTORY:  FUNMILAYO TECHNIQUE: Real-time complete ultrasound of the retroperitoneum with image documentation. COMPARISON: CT scan of the abdomen and pelvis 01/08/2023 and 01/15/2020 FINDINGS: Right kidney:  No acute findings. No stones. No hydronephrosis. The right kidney measures 10.3 x 4.4 x 4.1 cm. Left kidney:  No acute findings. No stones. No hydronephrosis. The left kidney measures 10.8 x 4.5 x 3.6 cm. Bladder:  1.9 x 1.8 x 1.2 cm hypoechoic area posterior to the left bladder is of indeterminate etiology but may be related to the vaginal cuff as the patient has undergone prior hysterectomy. No abnormalities or significant changes noted on CT scans dating back to 2020. Prevoid volume of the urinary bladder was 124 mL. Neither ureteral jet was noted on evaluation of the urinary bladder. No acute pathology or hydronephrosis noted. XR CHEST PORTABLE    Result Date: 1/7/2023  EXAMINATION: ONE XRAY VIEW OF THE CHEST 1/7/2023 12:57 pm COMPARISON: 09/25/2022 HISTORY: ORDERING SYSTEM PROVIDED HISTORY: OD, narcan TECHNOLOGIST PROVIDED HISTORY: OD, narcan Reason for Exam: port upright FINDINGS: Heart size normal.  Diffuse infiltrate throughout the left lung. No pneumothorax. No pleural effusion. Diffuse left lung infiltrate could represent pneumonia       IMPRESSION:   1year-old female with a PMH of anxiety, depression, drug abuse, GERD, T2DM, menorrhagia, hypertension, who presented to the hospital after being found down. Downtime unknown. Patient responded to Narcan. Work-up in the ED found drug screen was positive for cocaine, fentanyl, and cannabinoids. Additional labs consistent with rhabdomyolysis with myoglobin 6930, CK 1000 512 and 14,416. Troponin level 125. CTA chest showed patchy left lung pneumonia and fluid-filled esophagus with marked dilation prompting GI consult for esophageal dilation. Patient admitted for narcotic overdose. 1. Esophageal and gastric distention - suspect underlying gastroparesis. -Patient reports EGD 10/2022 that showed reflux.   Patient denies any recent nausea or vomiting  2.  GERD - Dunn's esophagus on prior EGD 2015  3.  Suspected overdose -patient was positive for cocaine, fentanyl, and cannabinoids  4.  Abnormal LFTs -possibly secondary to rhabdo vs fatty liver vs other.  Will continue to trend lab  5. Elevated troponin levels  6. FUNMILAYO    Old records, labs and imaging reviewed.     PLAN   1.  Start Reglan 10 mg IV every 6 hours  2.  Okay to start a trial of clear liquids after Reglan given-discussed with patient's nurse  3.  May consider EGD later this week pending patient's symptoms and cardiac work-up for elevated troponins  4.  Increase Protonix to 40 mg IV twice daily  5.  Trend LFTs  6.  Add hemoglobin A1c      Initial care time/code: Spent 60 out of 60 minutes on this date of service including chart prep, patient interaction, discussion with primary team, documentation and coordination of care for the above conditions    This plan was formulated in collaboration with Dr. Luther  Thank you for allowing us to participate in the care of your patient.    Electronically signed by: PHILIP Santiago CNP on 1/8/2023 at 11:57 AM     Please note that this note was generated using a voice recognition dictation software.  Although every effort was made to ensure the accuracy of this automated transcription, some errors in transcription may have occurred.

## 2023-01-08 NOTE — H&P
[address]     Department of Internal Medicine - Staff Internal Medicine Teaching Service          ADMISSION NOTE/HISTORY AND PHYSICAL EXAMINATION   Date: 1/7/2023  Patient Name: Wendi Candace  Date of admission: 1/7/2023 12:27 PM  YOB: 1980  PCP: Mahesh Romero DO  History Obtained From:  patient, electronic medical record    CHIEF COMPLAINT     Chief complaint: Altered mental status    HISTORY OF PRESENTING ILLNESS   Patient, 38years old female, presented to the hospital after being found down. Patient is unable to provide her history, stating that she does not remember what happened, she states that she can only remember that she went to her bed last night, followed by waking up in the hospital.    According to the charts, patient was found down, Narcan was given 2 Mg IV followed by 2 Mg IM with improvement in mentation. At initial presentation patient was not maintaining her oxygen saturation on room air, 79% after which she was put on 4 L nasal cannula and her oxygen saturation improved. In ED, WBC count 26.7, hemoglobin 17.2, magnesium 2.6, troponin level initially 125, followed by 110. CK1 512, myoglobin 6930. Blood culture drawn twice negative. Lactate 4.8 followed by 4.2, urine drug screen positive for cocaine, fentanyl and cannabinoid. Chest x-ray showed diffuse left lung infiltrate most likely pneumonia. Review of Systems   Constitutional:  Positive for fatigue. Respiratory:  Positive for cough. Negative for choking, chest tightness and shortness of breath. Cardiovascular:  Negative for chest pain. Gastrointestinal:  Negative for abdominal pain, constipation, diarrhea, nausea and vomiting. Neurological:  Positive for light-headedness. Negative for weakness and headaches. Psychiatric/Behavioral:  Negative for confusion.       PAST MEDICAL HISTORY     Past Medical History:   Diagnosis Date    Agoraphobia     Anxiety     sees  endurity at [facility][1]    Depression     Diarrhea     Drug abuse, marijuana     Dysmenorrhea     Endometriosis 1/23/2013    new dx    Fibroid     GERD (gastroesophageal reflux disease)     History of nipple discharge     Hyperlipidemia     Hypertension     no longer on meds-anxiety induced HTN    Hypothyroidism     Irritable bowel syndrome     LGSIL (low grade squamous intraepithelial dysplasia) 10/11    Menorrhagia     Midline low back pain without sciatica 1/26/2016    Mood swings     Ovarian cyst     Pelvic adhesions     Pelvic pain     Prolactin increased 2/12, 11/12    Rectal bleeding     Type II or unspecified type diabetes mellitus without mention of complication, not stated as uncontrolled     Dr. Barb Francis    Vocal cord polyps     Vomiting     Wellness examination     Dr. Adonis James last seen 5/13/2020       PAST SURGICAL HISTORY     Past Surgical History:   Procedure Laterality Date    ABDOMINAL ADHESION SURGERY  9/23/13    CARPAL TUNNEL RELEASE Right 06/24/2020    CARPAL TUNNEL RELEASE Right 6/24/2020    CARPAL TUNNEL RELEASE (SUPINE) 3080 TABLE, ARM TABLE performed by Marge Brewer DO at [place] [id number].  2/4/14    Benign biopsies    COLONOSCOPY  7-14-15    incomplete/poor prep, hemorrhoids. ENTEROCELE REPAIR  9/23/13    ESOPHAGOSCOPY  1/22/15    HYSTERECTOMY (CERVIX STATUS UNKNOWN)  9/23/13    HETAL, Right Salpingectomy, Enterocele, SHON    LAPAROSCOPY  1/23/2013    Diagnostic converted to [address]       due to cyst- right    TUBAL LIGATION      UPPER GASTROINTESTINAL ENDOSCOPY      WISDOM TOOTH EXTRACTION      x4       ALLERGIES     Codeine, Chantix [varenicline tartrate], and Magnesium citrate    MEDICATIONS PRIOR TO ADMISSION     Prior to Admission medications    Medication Sig Start Date End Date Taking?  Authorizing Provider   VENTOLIN  (90 Base) MCG/ACT inhaler INHALE 2 PUFFS INTO THE LUNGS EVERY 4 HOURS AS NEEDED FOR WHEEZING 12/6/22   Grace TAYLOR Jimmy,    ibuprofen (ADVIL;MOTRIN) 800 MG tablet Take 1 tablet by mouth every 8 hours as needed for Pain 22   Susan Man, APRN - CNP   Benzocaine-Menthol (SORE THROAT LOZENGES) 6-10 MG LOZG lozenge Take 1 lozenge by mouth every 2 hours as needed for Sore Throat 22   Susan Man, APRN - CNP   fluticasone Elvan Nordmann) 50 MCG/ACT nasal spray 1 spray by Each Nostril route daily 22   Susan Man, APRN - CNP   pantoprazole (PROTONIX) 40 MG tablet Take 1 tablet by mouth daily (with breakfast) 22   Grace Marquez DO   albuterol (PROVENTIL) (2.5 MG/3ML) 0.083% nebulizer solution TAKE 3 MLS BY NEBULIZATION EVERY 6 HOURS AS NEEDED FOR WHEEZING 21   Grace Marquez DO   paliperidone (INVEGA) 6 MG extended release tablet Take 1 tablet by mouth daily  Patient not taking: Reported on 2022 9/3/21   Chinmay Grant MD   budesonide-formoterol (SYMBICORT) 80-4.5 MCG/ACT AERO Inhale 2 puffs into the lungs 2 times daily 21   Chinmay Grant MD       SOCIAL HISTORY     Tobacco: half pack a day   Alcohol: N/A  Illicits: N/A    FAMILY HISTORY     Family History   Problem Relation Age of Onset    Kidney Disease Mother     Diabetes Mother     Heart Disease Mother     Lung Cancer Maternal Grandmother     Diabetes Maternal Grandmother     Lung Cancer Maternal Grandfather     Diabetes Maternal Grandfather     Breast Cancer Neg Hx     Cancer Neg Hx     Colon Cancer Neg Hx     Eclampsia Neg Hx     Hypertension Neg Hx     Ovarian Cancer Neg Hx      Labor Neg Hx     Spont Abortions Neg Hx     Stroke Neg Hx        PHYSICAL EXAM     Vitals: BP 88/69   Pulse (!) 111   Temp 98.4 °F (36.9 °C) (Oral)   Resp 15   Wt 167 lb (75.8 kg)   LMP 2013   SpO2 93%   BMI 30.54 kg/m²   Tmax: Temp (24hrs), Av.6 °F (35.3 °C), Min:92.8 °F (33.8 °C), Max:98.4 °F (36.9 °C)    Last Body weight:   Wt Readings from Last 3 Encounters:   23 167 lb (75.8 kg)   22 166 lb 1.6 oz (75.3 kg)   22 163 lb (73.9 kg) Body Mass Index : Body mass index is 30.54 kg/m². Physical Exam  Vitals and nursing note reviewed. Constitutional:       General: She is awake. Cardiovascular:      Heart sounds: Normal heart sounds. Pulmonary:      Effort: Pulmonary effort is normal.      Breath sounds: Normal breath sounds. Chest:      Chest wall: No tenderness. Abdominal:      Palpations: Abdomen is soft. Tenderness: There is no abdominal tenderness. Musculoskeletal:      Right lower leg: No edema. Left lower leg: No edema. Neurological:      Mental Status: She is alert and oriented to person, place, and time. Psychiatric:         Behavior: Behavior is cooperative. INVESTIGATIONS     Laboratory Testing:     Recent Results (from the past 24 hour(s))   POC Glucose Fingerstick    Collection Time: 01/07/23 12:33 PM   Result Value Ref Range    POC Glucose 106 (H) 65 - 105 mg/dL   POCT glucose    Collection Time: 01/07/23 12:41 PM   Result Value Ref Range    Glucose 106 mg/dL    QC OK?  yes    CBC with Auto Differential    Collection Time: 01/07/23 12:45 PM   Result Value Ref Range    WBC 26.7 (H) 3.5 - 11.3 k/uL    RBC 5.82 (H) 3.95 - 5.11 m/uL    Hemoglobin 17.2 (H) 11.9 - 15.1 g/dL    Hematocrit 56.5 (H) 36.3 - 47.1 %    MCV 97.1 82.6 - 102.9 fL    MCH 29.6 25.2 - 33.5 pg    MCHC 30.4 28.4 - 34.8 g/dL    RDW 13.9 11.8 - 14.4 %    Platelets 195 334 - 506 k/uL    MPV 10.5 8.1 - 13.5 fL    NRBC Automated 0.1 (H) 0.0 per 100 WBC    Immature Granulocytes 0 0 %    Seg Neutrophils 89 (H) 36 - 66 %    Lymphocytes 6 (L) 24 - 44 %    Monocytes 5 1 - 7 %    Eosinophils % 0 (L) 1 - 4 %    Basophils 0 0 - 2 %    Absolute Immature Granulocyte 0.00 0.00 - 0.30 k/uL    Segs Absolute 23.76 (H) 1.8 - 7.7 k/uL    Absolute Lymph # 1.60 1.0 - 4.8 k/uL    Absolute Mono # 1.34 (H) 0.1 - 0.8 k/uL    Absolute Eos # 0.00 0.0 - 0.4 k/uL    Basophils Absolute 0.00 0.0 - 0.2 k/uL    Morphology Normal    Comprehensive Metabolic Panel Collection Time: 01/07/23 12:45 PM   Result Value Ref Range    Glucose 102 (H) 70 - 99 mg/dL    BUN 31 (H) 6 - 20 mg/dL    Creatinine 1.71 (H) 0.50 - 0.90 mg/dL    Est, Glom Filt Rate 37 (L) >60 mL/min/1.73m2    Calcium 8.9 8.6 - 10.4 mg/dL    Sodium 143 135 - 144 mmol/L    Potassium 5.0 3.7 - 5.3 mmol/L    Chloride 101 98 - 107 mmol/L    CO2 17 (L) 20 - 31 mmol/L    Anion Gap 25 (H) 9 - 17 mmol/L    Alkaline Phosphatase 220 (H) 35 - 104 U/L    ALT 83 (H) 5 - 33 U/L     (H) <32 U/L    Total Bilirubin 0.3 0.3 - 1.2 mg/dL    Total Protein 7.7 6.4 - 8.3 g/dL    Albumin 4.4 3.5 - 5.2 g/dL    Albumin/Globulin Ratio 1.3 1.0 - 2.5   Magnesium    Collection Time: 01/07/23 12:45 PM   Result Value Ref Range    Magnesium 2.6 1.6 - 2.6 mg/dL   TOX SCR, BLD, ED    Collection Time: 01/07/23 12:45 PM   Result Value Ref Range    Acetaminophen Level <5 (L) 10 - 30 ug/mL    Ethanol <10 <10 mg/dL    Ethanol percent <5.146 <4.832 %    Salicylate Lvl <1 (L) 3 - 10 mg/dL    Toxic Tricyclic Sc,Blood NEGATIVE NEGATIVE   Troponin    Collection Time: 01/07/23 12:45 PM   Result Value Ref Range    Troponin, High Sensitivity 125 (HH) 0 - 14 ng/L   CK    Collection Time: 01/07/23 12:45 PM   Result Value Ref Range    Total CK 1,512 (H) 26 - 192 U/L   Myoglobin, Blood    Collection Time: 01/07/23 12:45 PM   Result Value Ref Range    Myoglobin 6,930 (H) 25 - 58 ng/mL   Culture, Blood 1    Collection Time: 01/07/23  1:45 PM    Specimen: Blood   Result Value Ref Range    Specimen Description . BLOOD     Special Requests R HAND 1ML     Culture NO GROWTH <24 HRS    Lactate, Sepsis    Collection Time: 01/07/23  1:52 PM   Result Value Ref Range    Lactic Acid, Sepsis, Whole Blood 4.8 (H) 0.5 - 1.9 mmol/L   Culture, Blood 1    Collection Time: 01/07/23  1:55 PM    Specimen: Blood   Result Value Ref Range    Specimen Description . BLOOD     Special Requests LT HAND 2ML     Culture NO GROWTH <24 HRS    Troponin    Collection Time: 01/07/23  1:58 PM Result Value Ref Range    Troponin, High Sensitivity 110 (HH) 0 - 14 ng/L   POC Glucose Fingerstick    Collection Time: 01/07/23  8:21 PM   Result Value Ref Range    POC Glucose 105 65 - 105 mg/dL   Lactate, Sepsis    Collection Time: 01/07/23  9:27 PM   Result Value Ref Range    Lactic Acid, Sepsis, Whole Blood 4.2 (H) 0.5 - 1.9 mmol/L   DRUG SCREEN MULTI URINE    Collection Time: 01/07/23 10:24 PM   Result Value Ref Range    Amphetamine Screen, Ur NEGATIVE NEGATIVE    Barbiturate Screen, Ur NEGATIVE NEGATIVE    Benzodiazepine Screen, Urine NEGATIVE NEGATIVE    Cocaine Metabolite, Urine POSITIVE (A) NEGATIVE    Methadone Screen, Urine NEGATIVE NEGATIVE    Opiates, Urine NEGATIVE NEGATIVE    Phencyclidine, Urine NEGATIVE NEGATIVE    Cannabinoid Scrn, Ur POSITIVE (A) NEGATIVE    Oxycodone Screen, Ur NEGATIVE NEGATIVE    Fentanyl, Ur POSITIVE (A) NEGATIVE    Test Information       Assay provides medical screening only. The absence of expected drug(s) and/or metabolite(s) may indicate diluted or adulterated urine, limitations of testing or timing of collection. Imaging:   XR CHEST PORTABLE    Result Date: 1/7/2023  Diffuse left lung infiltrate could represent pneumonia       ASSESSMENT & PLAN     Principal Problem:    Altered mental status, unspecified  Active Problems:    Acute respiratory failure with hypoxia (HCC)    Elevated troponin    Rhabdomyolysis    Acute kidney injury ([address]    Gastroesophageal reflux disease without esophagitis  Resolved Problems:    * No resolved hospital problems. *      Altered mental status  Most likely secondary to drug overdose : Cocaine, cannabinoid and fentanyl  Mentation improved by giving Narcan  Continue to monitor.   Follow-up with BMP, troponin and lactate    Acute respiratory failure with hypoxia  Secondary to #1  Initially patient was normal draining oxygen saturation on room air, afterwards patient was maintaining oxygen saturation on nasal cannula  Currently on 5 L, oxygen saturation above 95%  Chest x-ray does show left lung involvement most likely secondary to pneumonia  We will get an echo  Follow-up with CT chest  Follow-up with respiratory culture  Follow-up with lactate level    Acute kidney injury  Secondary to rhabdomyolysis  Patient was found down with unknown time was likely leading to rhabdomyolysis  Myoglobin 6930, CK1 512  BUN 49, creatinine 1.95 -trending upward  Patient currently on continuous normal saline infusion  Will follow-up with repeat BMP  Nephrology consulted, appreciate recommendations    Rhabdomyolysis  Secondary to nontraumatic cause  Patient was found down with unknown time was likely leading to rhabdomyolysis  Myoglobin 6930, CK1 512  BUN 49, creatinine 1.95 -trending upward  Patient currently on continuous normal saline infusion  Will follow-up with repeat BMP  Nephrology consulted, appreciate recommendations    Elevated troponin  Most likely secondary to cocaine overdose, demand ischemia  Patient denies any chest pain  Troponin initially 125, trended down to 110  Cardiology consulted, appreciate recommendations  Cardiology recommends to trend troponin. 2D echo ordered follow-up with results  Trend troponin    Hyperkalemia  BMP showed potassium level 5.4  Hyperkalemia protocol ordered  Follow-up with repeat levels    Gastroesophageal reflux disease  Patient currently on Protonix p.o. 40 Mg      DVT ppx: Heparin on hold for now. GI ppx: Protonix p.o. 40 Mg    PT/OT/SW : PT OT consulted, appreciate recommendations  Discharge Planning:  consulted, appreciate recommendations    Carroll Villalobos MD  Internal Medicine Resident, PGY-1  [address], [place]  1/7/2023, 11:23 PM

## 2023-01-08 NOTE — PROGRESS NOTES
Confirmed with provider to redraw potassium sample again because it was slightly hemolyzed before insulin dose. 0155 serum potassium at 5.1, informed provider, confirmed  to hold potasium.     9024 provider notified of latest vitals, MEWS 4

## 2023-01-08 NOTE — CARE COORDINATION
Case Management Assessment  Initial Evaluation    Date/Time of Evaluation: 1/8/2023 10:31 AM  Assessment Completed by: Samir Dumont RN    If patient is discharged prior to next notation, then this note serves as note for discharge by case management. Patient Name: Franck Loera                   YOB: 1980  Diagnosis: Narcotic overdose, accidental or unintentional, initial encounter ([facility] [T40.601A]  Opiate overdose, undetermined intent, [facility] encounter ([facility] [T40.604A]  Altered mental status, unspecified [R41.82]                   Date / Time: 1/7/2023 12:27 PM    Patient Admission Status: Inpatient   Readmission Risk (Low < 19, Mod (19-27), High > 27): Readmission Risk Score: 10.9    Current PCP: Le Hoover, DO  PCP verified by CM? (P) Yes Mariano Constantino MD)    Chart Reviewed: Yes      History Provided by: (P) Patient  Patient Orientation: (P) Alert and Oriented, Person, Place, Situation    Patient Cognition: (P) Alert    Hospitalization in the last 30 days (Readmission):  No    If yes, Readmission Assessment in CM Navigator will be completed.     Advance Directives:      Code Status: Full Code   Patient's Primary Decision Maker is: (P) Patient Declined (Legal Next of Kin Remains as Decision Maker)      Discharge Planning:    Patient lives with: (P) Friends Type of Home: (P) House  Primary Care Giver: (P) Self  Patient Support Systems include: (P) Friends/Neighbors   Current Financial resources: (P) Medicaid  Current community resources:    Current services prior to admission: (P) None            Current DME:              Type of Home Care services:  (P) None    ADLS  Prior functional level: (P) Independent in ADLs/IADLs  Current functional level: (P) Independent in ADLs/IADLs    PT AM-PAC:   /24  OT AM-PAC:   /24    Family can provide assistance at DC: (P) No  Would you like Case Management to discuss the discharge plan with any other family members/significant others, and if so, who? (P) No  Plans to Return to Present Housing: (P) Yes  Other Identified Issues/Barriers to RETURNING to current housing: na  Potential Assistance needed at discharge: (P) N/A            Potential DME:    Patient expects to discharge to: (P) [facility] for transportation at discharge: (P) Friends    Financial    Payor: Tony Adhikari / Plan: [address] / Product Type: *No Product type* /     Does insurance require precert for SNF: na    Potential assistance Purchasing Medications: (P) No  Meds-to-Beds request:        [facility], [facility] [address] [facility] [phone]   Dr. Lakhwinder Calderón [place] [address]  Phone: [phone] Fax: [phone]      Notes:    Factors facilitating achievement of predicted outcomes: Friend support    Barriers to discharge: Anxiety and Medical complications    Additional Case Management Notes: Spoke with patient, role explained. Plan to return home independently. May need transportation. SW consult for drug and etoh abuse. Patient denies drug use and feels someone put something in her drink. Address, PCP, telephone numbers, ER contacts and insurance reviewed. The Plan for Transition of Care is related to the following treatment goals of Narcotic overdose, accidental or unintentional, initial encounter ([facility] [address] [T40.601A]  Opiate overdose, undetermined intent, initial encounter ([facility] [address] [T40.604A]  Altered mental status, unspecified [J98.09]    IF APPLICABLE: The Patient and/or patient representative Kimberly Junior and her family were provided with a choice of provider and agrees with the discharge plan. Freedom of choice list with basic dialogue that supports the patient's individualized plan of care/goals and shares the quality data associated with the providers was provided to: (P) Patient   Patient Representative Name:       The Patient and/or Patient Representative Agree with the Discharge Plan?  (P) Yes    Sarah Sotomayor RN  Case [phone]

## 2023-01-08 NOTE — PROGRESS NOTES
[facility]  Internal Medicine Teaching Residency Program  Inpatient Daily Progress Note  ______________________________________________________________________________    Patient: Mark Da Silva  YOB: 1980   UUS:[id number]    Acct: [de-identified]     Room: Critical access hospital[facility]-01  Admit date: 1/7/2023  Today's date: 01/08/23  Number of days in the hospital: 1    SUBJECTIVE   Admitting Diagnosis: Altered mental status, unspecified  CC: Altered mental status    No acute events happened overnight  Patient examined  at bedside. Labs and charts reviewed  Patient denies any symptom. Patient denies shortness of breath, chest pain, belly pain. Patient reports improvement in her breathing  Blood pressure readings are 68, maintaining oxygen saturation of 95% on 6 L nasal cannula, heart rate 117/min    Review of Systems  Constitutional: Negative for fatigue. Respiratory:  Positive for cough. Negative for choking, chest tightness and shortness of breath. Cardiovascular:  Negative for chest pain. Gastrointestinal:  Negative for abdominal pain, constipation, diarrhea, nausea and vomiting. Neurological: Negative for light-headedness. Negative for weakness and headaches. Psychiatric/Behavioral:  Negative for confusion. BRIEF HISTORY     Patient, 38years old female, presented to the hospital after being found down. Patient is unable to provide her history, stating that she does not remember what happened, she states that she can only remember that she went to her bed last night, followed by waking up in the hospital.     According to the charts, patient was found down, Narcan was given 2 Mg IV followed by 2 Mg IM with improvement in mentation. At initial presentation patient was not maintaining her oxygen saturation on room air, 79% after which she was put on 4 L nasal cannula and her oxygen saturation improved.      In ED, WBC count 26.7, hemoglobin 17.2, magnesium 2.6, troponin level initially 125, followed by 110. CK1 512, myoglobin 6930. Blood culture drawn twice negative. Lactate 4.8 followed by 4.2, urine drug screen positive for cocaine, fentanyl and cannabinoid. Chest x-ray showed diffuse left lung infiltrate most likely pneumonia.  : CT chest shows left sided pneumonea with dilated esophagus, will get abdominal CT and GI consult, BMP shows worsening FUNMILAYO    OBJECTIVE     Vital Signs:  /68   Pulse (!) 109   Temp 98.4 °F (36.9 °C) (Oral)   Resp 24   Wt 167 lb (75.8 kg)   LMP 2013   SpO2 92%   BMI 30.54 kg/m²     Temp (24hrs), Av.5 °F (35.8 °C), Min:92.8 °F (33.8 °C), Max:98.4 °F (36.9 °C)    In: 10   Out: -     Physical Exam  Vitals and nursing note reviewed. Constitutional:       General: She is awake. Cardiovascular:      Heart sounds: Normal heart sounds. Pulmonary:      Effort: Pulmonary effort is normal.      Breath sounds: Normal breath sounds. Chest:      Chest wall: No tenderness. Abdominal:      Palpations: Abdomen is soft. Tenderness: There is no abdominal tenderness. Musculoskeletal:      Right lower leg: No edema. Left lower leg: No edema. Neurological:      Mental Status: She is alert and oriented to person, place, and time. Psychiatric:         Behavior: Behavior is cooperative.      Medications:  Scheduled Medications:    sodium chloride  1,000 mL IntraVENous Once    sodium chloride  20 mL/kg IntraVENous Once    sodium chloride flush  5-40 mL IntraVENous 2 times per day    [Held by provider] heparin (porcine)  5,000 Units SubCUTAneous 3 times per day    pantoprazole  40 mg Oral QAM AC    insulin regular  10 Units IntraVENous Once    And    dextrose bolus  250 mL IntraVENous Once     Continuous Infusions:    sodium chloride      sodium chloride 100 mL/hr at 23 2214    dextrose       PRN Medicationssodium chloride flush, 5-40 mL, PRN  sodium chloride, , PRN  ondansetron, 4 mg, Q8H PRN   Or  ondansetron, 4 mg, Q6H PRN  polyethylene glycol, 17 g, Daily PRN  acetaminophen, 650 mg, Q6H PRN   Or  acetaminophen, 650 mg, Q6H PRN  glucose, 4 tablet, PRN  dextrose bolus, 125 mL, PRN   Or  dextrose bolus, 250 mL, PRN  glucagon (rDNA), 1 mg, PRN  dextrose, , Continuous PRN        Diagnostic Labs:  CBC:   Recent Labs     01/07/23  1245   WBC 26.7*   RBC 5.82*   HGB 17.2*   HCT 56.5*   MCV 97.1   RDW 13.9        BMP:   Recent Labs     01/07/23  1245 01/07/23  2314 01/08/23  0119    138  --    K 5.0 5.4* 5.1    106  --    CO2 17* 14*  --    BUN 31* 49*  --    CREATININE 1.71* 1.95*  --      BNP: No results for input(s): BNP in the last 72 hours. PT/INR: No results for input(s): PROTIME, INR in the last 72 hours. APTT: No results for input(s): APTT in the last 72 hours. CARDIAC ENZYMES: No results for input(s): CKMB, CKMBINDEX, TROPONINI in the last 72 hours. Invalid input(s): CKTOTAL;3  FASTING LIPID PANEL:  Lab Results   Component Value Date    CHOL 148 09/01/2021    HDL 37 (L) 09/01/2021    TRIG 164 (H) 09/01/2021     LIVER PROFILE:   Recent Labs     01/07/23  1245   *   ALT 83*   BILITOT 0.3   ALKPHOS 220*      MICROBIOLOGY:   Lab Results   Component Value Date/Time    CULTURE NO GROWTH 12 HOURS 01/07/2023 01:55 PM       Imaging:    CT CHEST WO CONTRAST    Result Date: 1/8/2023  Diffuse patchy left lung pneumonia with minimal involvement on the right. The esophagus is fluid-filled and markedly dilated measuring 5.5 cm distally. Stomach has a similar appearance. This may extend into the partially visualized proximal most duodenum. Follow-up postcontrast CT abdomen/pelvis recommended for further evaluation. Evidence of old granulomatous disease. XR CHEST PORTABLE    Result Date: 1/7/2023  Diffuse left lung infiltrate could represent pneumonia       ASSESSMENT & PLAN     Principal Problem (Resolved):     Altered mental status, unspecified  Active Problems:    Acute respiratory failure with hypoxia (HCC)    Elevated troponin    Rhabdomyolysis    Acute kidney injury ([address]    Hyperkalemia    Esophageal dilatation    Gastroesophageal reflux disease     Altered mental status  Resolved  Most likely secondary to drug overdose : Cocaine, cannabinoid and fentanyl  Mentation improved by giving Narcan  Continue to monitor. Follow-up with BMP, troponin and lactate     Acute respiratory failure with hypoxia  Secondary to #1  Initially patient was normal draining oxygen saturation on room air, afterwards patient was maintaining oxygen saturation on nasal cannula  Currently on 5 L, oxygen saturation above 95%  Resolved   Chest x-ray does show left lung involvement most likely secondary to pneumonia  We will get an echo  Follow-up with CT chest  Impression   Diffuse patchy left lung pneumonia with minimal involvement on the right. The esophagus is fluid-filled and markedly dilated measuring 5.5 cm distally. Stomach has a similar appearance. This may extend into the partially   visualized proximal most duodenum. Follow-up postcontrast CT abdomen/pelvis   recommended for further evaluation. Evidence of old granulomatous disease.      Follow-up with respiratory culture  Follow-up with lactate level : 4.8>4.2>4.9     Acute kidney injury  Secondary to rhabdomyolysis  Patient was found down with unknown time was likely leading to rhabdomyolysis  Myoglobin 6930, CK1 512  BUN 49, creatinine 1.95 -trending upward  Patient currently on continuous normal saline infusion along with Bolus IV of NS  Will follow-up with repeat [facility]  Nephrology consulted, appreciate recommendations     Rhabdomyolysis  Secondary to nontraumatic cause/found down   Patient was found down with unknown time was likely leading to rhabdomyolysis  Myoglobin 6930, CK1 512  BUN 49, creatinine 1.95 -trending upward  Patient currently on continuous normal saline infusion  Will follow-up with repeat [facility]  Nephrology consulted, appreciate recommendations     Elevated troponin  Most likely secondary to cocaine overdose, demand ischemia  Patient denies any chest pain  Troponin initially 125, trended down to 110->104  Cardiology consulted, appreciate recommendations  Cardiology recommends to trend troponin. 2D echo ordered follow-up with results  Trend troponin    Esophageal Dilatation   Patient strict NPO  CT chest shows     The esophagus is fluid-filled and markedly dilated measuring 5.5 cm distally. Stomach has a similar appearance. This may extend into the partially   visualized proximal most duodenum. Follow-up postcontrast CT abdomen/pelvis   recommended for further evaluation. Will get GI consult  F/u with Abdominal CT    Hyperkalemia  BMP showed potassium level 5.4  Repeat Potassium 5.1    Gastroesophageal reflux disease  Patient currently on Protonix p.o. 40 Mg     DVT ppx: Heparin on hold for now. GI ppx: Protonix p.o. 40 Mg     PT/OT/SW : PT OT consulted, appreciate recommendations  Discharge Planning:  consulted, appreciate recommendations      Diet: NPO  DVT ppx :  Heparin for hold now   GI ppx: Protonix     PT/OT/SW: PT/OT consulted, appreciate recommendations   Discharge Planning:  consulted, appreciate recommendations     Sneha Prince M.D. Internal Medicine Resident PGY-1  [facility].    4:32 AM 1/8/2023     Please note that part of this chart was generated using voice recognition dictation software. Although every effort was made to ensure the accuracy of this automated transcription, some errors in transcription may have occurred.

## 2023-01-08 NOTE — PLAN OF CARE
Problem: Discharge Planning  Goal: Discharge to home or other facility with appropriate resources  1/8/2023 1324 by Daylene Severance, RN  Outcome: Progressing  1/8/2023 0221 by Oralia Cuevas RN  Outcome: Progressing     Problem: Safety - Adult  Goal: Free from fall injury  1/8/2023 1324 by Daylene Severance, RN  Outcome: Progressing  1/8/2023 0221 by Oralia Cuevas RN  Outcome: Progressing     Problem: Pain  Goal: Verbalizes/displays adequate comfort level or baseline comfort level  Outcome: Progressing     Problem: Pain  Goal: Verbalizes/displays adequate comfort level or baseline comfort level  Outcome: Progressing     Problem: ABCDS Injury Assessment  Goal: Absence of physical injury  1/8/2023 1324 by Daylene Severance, RN  Outcome: Progressing  1/8/2023 0221 by Oralia Cuevas RN  Outcome: Progressing

## 2023-01-08 NOTE — CONSULTS
Renal Consult Note    Patient :  Roscoe Snider; 38 y.o. MRN# [id number]  Location:  [facility]  Attending:  Dudley Hall MD  Admit Date:  1/7/2023   Hospital Day: 1    Reason for Consult:     Asked by Dr Dudley Hall MD to see for rhabdomyolysis FUNMILAYO    History Obtained From:     patient, electronic medical record    History of Present Illness:     Roscoe Snider; 38 y.o. female with past medical history as mentioned below. Patient was noted to be found down and unresponsive with mentation changes and remembers going to bed the night before and waking up in the hospital.  Patient had positive polysubstance drug panel. Patient was also noted to have elevated myoglobin CK levels indicating possible rhabdomyolysis with elevated creatinine or decreased kidney function. H&P reviewed. Patient was given Narcan 2 mg x 2 and with improved mentation. Was placed on 4 L nasal cannula. Chest x-ray shows pneumonia. Patient did have hypotension with systolics as low as 29Z. Patient seen evaluated at bedside. Is on IV fluids no Smith placed but has 350 measured with at least 7 occurrences since admission. Lab work reviewed. Creatinine 1.7 normokalemia total CK level 14,416 myoglobin 2356. Continues on normal saline 0.9 percent at 100 mL an hour. Medications do mention ibuprofen 1 tablet every 8 hours. Nephrology is consulted to acute kidney injury. No history of recent contrast exposure  No h/o prolonged NSAIDs use in the past,   No h/o nephrolithiasis, No recent skin rashes or arthralgias   No hematuria or pyuria noticed in the recent past.   Doesn't report any reduction in the urine output recently. Non report of any obstructive urinary symptoms (urgency, frequency, weak stream, straining while urination). No h/o recurrent UTIs in the past.    Past History/Allergies? Social History:     Past Medical History:   Diagnosis Date    Agoraphobia     Anxiety     sees Dr. Kaelyn Mon at [facility] Diarrhea     Drug abuse, marijuana     Dysmenorrhea     Endometriosis 1/23/2013    new dx    Fibroid     GERD (gastroesophageal reflux disease)     History of nipple discharge     Hyperlipidemia     Hypertension     no longer on meds-anxiety induced HTN    Hypothyroidism     Irritable bowel syndrome     LGSIL (low grade squamous intraepithelial dysplasia) 10/11    Menorrhagia     Midline low back pain without sciatica 1/26/2016    Mood swings     Ovarian cyst     Pelvic adhesions     Pelvic pain     Prolactin increased 2/12, 11/12    Rectal bleeding     Type II or unspecified type diabetes mellitus without mention of complication, not stated as uncontrolled     Dr. Samia Peterson    Vocal cord polyps     Vomiting     Wellness examination     Dr. Danielle Whitehead last seen 5/13/2020     Past Surgical History:   Procedure Laterality Date    ABDOMINAL ADHESION SURGERY  9/23/13    CARPAL TUNNEL RELEASE Right 06/24/2020    CARPAL TUNNEL RELEASE Right 6/24/2020    CARPAL TUNNEL RELEASE (SUPINE) 3080 TABLE, ARM TABLE performed by Dionne Garcia DO at [place] [id number].  2/4/14    Benign biopsies    COLONOSCOPY  7-14-15    incomplete/poor prep, hemorrhoids.     ENTEROCELE REPAIR  9/23/13    ESOPHAGOSCOPY  1/22/15    HYSTERECTOMY (CERVIX STATUS UNKNOWN)  9/23/13    HETAL, Right Salpingectomy, Enterocele, SHON    LAPAROSCOPY  1/23/2013    Diagnostic converted to [address]       due to cyst- right    TUBAL LIGATION      UPPER GASTROINTESTINAL ENDOSCOPY      WISDOM TOOTH EXTRACTION      x4       Allergies   Allergen Reactions    Codeine     Chantix [Varenicline Tartrate] Rash    Magnesium Citrate Nausea And Vomiting       Social History     Socioeconomic History    Marital status:      Spouse name: Not on file    Number of children: Not on file    Years of education: Not on file    Highest education level: Not on file   Occupational History    Occupation: unemployeed   Tobacco Use Smoking status: Every Day     Packs/day: 1.00     Years: 15.00     Pack years: 15.00     Types: Cigarettes    Smokeless tobacco: Never   Vaping Use    Vaping Use: Never used   Substance and Sexual Activity    Alcohol use: No    Drug use: Yes     Types: Marijuana Drew Sow)     Comment: everyday    Sexual activity: Yes     Partners: Male     Birth control/protection: Surgical     Comment: HETAL Right Salpingectomy   Other Topics Concern    Not on file   Social History Narrative    Not on file     Social Determinants of Health     Financial Resource Strain: Low Risk     Difficulty of Paying Living Expenses: Not hard at all   Food Insecurity: No Food Insecurity    Worried About Running Out of Food in the Last Year: Never true    Ran Out of Food in the Last Year: Never true   Transportation Needs: Not on file   Physical Activity: Not on file   Stress: Not on file   Social Connections: Not on file   Intimate Partner Violence: Not on file   Housing Stability: Not on file       Family History:        Family History   Problem Relation Age of Onset    Kidney Disease Mother     Diabetes Mother     Heart Disease Mother     Lung Cancer Maternal Grandmother     Diabetes Maternal Grandmother     Lung Cancer Maternal Grandfather     Diabetes Maternal Grandfather     Breast Cancer Neg Hx     Cancer Neg Hx     Colon Cancer Neg Hx     Eclampsia Neg Hx     Hypertension Neg Hx     Ovarian Cancer Neg Hx      Labor Neg Hx     Spont Abortions Neg Hx     Stroke Neg Hx        Outpatient Medications:     Medications Prior to Admission: VENTOLIN  (90 Base) MCG/ACT inhaler, INHALE 2 PUFFS INTO THE LUNGS EVERY 4 HOURS AS NEEDED FOR WHEEZING  ibuprofen (ADVIL;MOTRIN) 800 MG tablet, Take 1 tablet by mouth every 8 hours as needed for Pain  Benzocaine-Menthol (SORE THROAT LOZENGES) 6-10 MG LOZG lozenge, Take 1 lozenge by mouth every 2 hours as needed for Sore Throat  fluticasone (FLONASE) 50 MCG/ACT nasal spray, 1 spray by Each Nostril route daily  pantoprazole (PROTONIX) 40 MG tablet, Take 1 tablet by mouth daily (with breakfast)  albuterol (PROVENTIL) (2.5 MG/3ML) 0.083% nebulizer solution, TAKE 3 MLS BY NEBULIZATION EVERY 6 HOURS AS NEEDED FOR WHEEZING  paliperidone (INVEGA) 6 MG extended release tablet, Take 1 tablet by mouth daily (Patient not taking: Reported on 6/27/2022)  budesonide-formoterol (SYMBICORT) 80-4.5 MCG/ACT AERO, Inhale 2 puffs into the lungs 2 times daily    Current Medications:     Scheduled Meds:    piperacillin-tazobactam  3,375 mg IntraVENous Q8H    pantoprazole (PROTONIX) 40 mg injection  40 mg IntraVENous Daily    sodium chloride  20 mL/kg IntraVENous Once    sodium chloride flush  5-40 mL IntraVENous 2 times per day    [Held by provider] heparin (porcine)  5,000 Units SubCUTAneous 3 times per day    insulin regular  10 Units IntraVENous Once    And    dextrose bolus  250 mL IntraVENous Once     Continuous Infusions:    sodium chloride 10 mL/hr at 01/08/23 0811    sodium chloride 100 mL/hr at 01/08/23 1144    dextrose       PRN Meds:  sodium chloride flush, sodium chloride, ondansetron **OR** ondansetron, polyethylene glycol, acetaminophen **OR** acetaminophen, glucose, dextrose bolus **OR** dextrose bolus, glucagon (rDNA), dextrose    Review of Systems:     Constitutional: No fever, no chills, no lethargy, no weakness. Found unresponsive. HEENT:  No headache, otalgia, itchy eyes, nasal discharge or sore throat. Cardiac:  No chest pain, dyspnea, orthopnea or PND. Chest:  No cough, phlegm or wheezing. Abdomen:  No abdominal pain, nausea or vomiting. Neuro:  No focal weakness, abnormal movements or seizure like activity. Skin:   No rashes, no itching. :   No hematuria, no pyuria, no dysuria, no flank pain. Extremities:  No swelling or joint pains. ROS was otherwise negative except as mentioned in the [address] Input/Output:       I/O last 3 completed shifts:   In: 10 [I.V.:10]  Out: -     Vital Signs: Temperature:  Temp: 98.6 °F (37 °C)  TMax:   Temp (24hrs), Av.6 °F (36.4 °C), Min:92.8 °F (33.8 °C), Max:98.7 °F (37.1 °C)    Respirations:  Resp: 23  Pulse:   Heart Rate: (!) 105  BP:    BP: 118/70  BP Range: Systolic (41CMF), ZAIRE:130 , Min:88 , EYK:399       Diastolic (80KNE), TBC:06, Min:66, Max:103      Physical Examination:     General:  AAO x 3, speaking in full sentences, no accessory muscle use. HEENT: Atraumatic, normocephalic, no throat congestion, moist mucosa. Eyes:   Pupils equal, round and reactive to light, EOMI. Neck:   No JVD, no thyromegaly, no lymphadenopathy. Chest:   Bilateral vesicular breath sounds, no rales or wheezes. Cardiac:  S1 S2 RR, no murmurs, gallops or rubs, JVP not raised. Abdomen: Soft, non-tender, non distended, BS audible. :   No suprapubic or flank tenderness. Neuro:   AAO x 3, No FND. SKIN:  No rashes, good skin turgor.   Extremities:  No edema, No clubbing, No cyanosis    Labs:       Recent Labs     23  1245 23  1033   WBC 26.7* 18.8*   RBC 5.82* 4.30   HGB 17.2* 12.8   HCT 56.5* 40.1   MCV 97.1 93.3   MCH 29.6 29.8   MCHC 30.4 31.9   RDW 13.9 14.4    169   MPV 10.5 11.1      BMP:   Recent Labs     23  1245 23  2314 23  0119 23  1033    138  --  137   K 5.0 5.4* 5.1 4.8    106  --  108*   CO2 17* 14*  --  15*   BUN 31* 49*  --  58*   CREATININE 1.71* 1.95*  --  1.72*   GLUCOSE 102* 112*  --  137*   CALCIUM 8.9 8.0*  --  8.1*      Magnesium:    Recent Labs     23  1245   MG 2.6     Albumin:    Recent Labs     23  1245   LABALBU 4.4     ADRIEL:      Lab Results   Component Value Date/Time    ADRIEL NEGATIVE 2016 11:54 AM     SPEP:  Lab Results   Component Value Date/Time    PROT 7.7 2023 12:45 PM     Hep BsAg:         Lab Results   Component Value Date/Time    HEPBSAG NONREACTIVE 2016 11:54 AM     Hep C AB:          Lab Results   Component Value Date/Time    HEPCAB NONREACTIVE 05/19/2016 11:54 AM       Urinalysis/Chemistries:      Lab Results   Component Value Date/Time    NITRU NEGATIVE 07/05/2022 12:24 AM    COLORU Yellow 07/05/2022 12:24 AM    PHUR 5.5 07/05/2022 12:24 AM    WBCUA 3 to 5 07/05/2022 12:24 AM    RBCUA 0 TO 2 07/05/2022 12:24 AM    MUCUS 3+ 03/14/2015 03:22 PM    TRICHOMONAS NOT REPORTED 03/14/2015 03:22 PM    YEAST NOT REPORTED 03/14/2015 03:22 PM    BACTERIA FEW 07/05/2022 12:24 AM    CLARITYU Clear 02/09/2021 10:21 AM    SPECGRAV 1.020 07/05/2022 12:24 AM    LEUKOCYTESUR NEGATIVE 07/05/2022 12:24 AM    UROBILINOGEN Normal 07/05/2022 12:24 AM    BILIRUBINUR NEGATIVE 07/05/2022 12:24 AM    BILIRUBINUR Negative 02/09/2021 10:21 AM    BILIRUBINUR NEGATIVE 09/22/2011 05:13 PM    BLOODU Negative 02/09/2021 10:21 AM    GLUCOSEU NEGATIVE 07/05/2022 12:24 AM    GLUCOSEU NEGATIVE 09/22/2011 05:13 PM    KETUA NEGATIVE 07/05/2022 12:24 AM    AMORPHOUS NOT REPORTED 03/14/2015 03:22 PM     Urine Creatinine:     Lab Results   Component Value Date/Time    LABCREA 160.2 05/15/2015 08:36 AM     Radiology:     CXR:     Assessment:     1. Acute Kidney Injury: Nonoliguric likely secondary to pigment nephropathy related to underlying rhabdomyolysis, prerenal factors and likely has some underlying ATN due to hypotension and further complicated by NSAIDs and polysubstance usage at home. Baseline creatinine seems to be 1.9-1.1 mg/dl. 2. Nontraumatic Rhabdomyolysis -elevated lab work CK levels myoglobin. 3.  Altered mental status  4. Metabolic acidosis. 5.  Polysubstance usage, urine tox positive for cannabinoids, cocaine, fentanyl. 6.  Diffuse left lung infiltrate-pneumonia. 7.  Acute respiratory failure with hypoxia  8. Hypotension. 9.  Hyperkalemia likely due to underlying renal dysfunction and metabolic acidosis, now improved. 10.  Elevated troponin. 11. GERD  12.  Lactic acidosis. Plan:   1. Continue current bicarb drip.   2.  Strict I's and O's and daily weights recorded in chart  3. Avoid IV contrast and nephrotoxic agents if at all possible  4. Will Check Renal Ultrasound to r/o element of obstruction and to assess the kidney size/echotexture. 5.  Comprehensive urine testing including Urinalysis, Urine sodium, potassium, chloride, Urine protein and creatinine to quantify the proteinuria if any at all. Will check urinary eosinophils as well. 6.  Will Order serum and urine protein electrophoresis to r/o element to occult paraprotein disease. 7.  Will order Hepatitis B and C, ADRIEL, Complement levels. 8.  Will follow. Nutrition   Please ensure that patient is on a renal diet/TF. Avoid nephrotoxic drugs/contrast exposure. Thank you for the consultation. Please do not hesitate to contact us for any further questions/concerns. We will continue to follow along with you. Electronically signed by Viviaan More CNP, APRN - CNP on 1/8/2023 at 11:56 AM .  [facility]       Attending Physician Statement  I have discussed the care of this patient, including pertinent history and exam findings, with the Resident/CNP. I have reviewed and edited the key elements of all parts of the encounter with the Resident/CNP. I agree with the assessment, plan and orders as documented by the Resident/CNP. Fernando Khan MD   Nephrology [address] [place] [address] Drive    This note is created with the assistance of a speech-recognition program. While intending to generate a document that actually reflects the content of the visit, no guarantees can be provided that every mistake has been identified and corrected by editing.

## 2023-01-08 NOTE — PROGRESS NOTES
Physical Therapy  Facility/Department: [address] STEPDOWN  Physical Therapy Initial Assessment    Name: Jennifer Stinson  : 1980  MRN: [id number]  Date of Service: 2023    Discharge Recommendations: Further therapy recommended at discharge. Chief Complaint   Patient presents with    Drug Overdose     78-year-old female with a PMH of anxiety, depression, drug abuse, GERD, T2DM, menorrhagia, hypertension, who presented to the hospital after being found down. Downtime unknown. Patient responded to Narcan. Work-up in the ED found drug screen was positive for cocaine, fentanyl, and cannabinoids. Additional labs consistent with rhabdomyolysis with myoglobin 6930, CK 1000 512 and 14,416. Troponin level 125. CTA chest showed patchy left lung pneumonia and fluid-filled esophagus with marked dilation prompting GI consult for esophageal dilation. Patient admitted for narcotic overdose. PT Equipment Recommendations  Equipment Needed: No      Patient Diagnosis(es): The encounter diagnosis was Opiate overdose, undetermined intent, initial encounter ([facility] [address] Past Medical History:  has a past medical history of Agoraphobia, Anxiety, Depression, Diarrhea, Drug abuse, marijuana, Dysmenorrhea, Endometriosis, Fibroid, GERD (gastroesophageal reflux disease), History of nipple discharge, Hyperlipidemia, Hypertension, Hypothyroidism, Irritable bowel syndrome, LGSIL (low grade squamous intraepithelial dysplasia), Menorrhagia, Midline low back pain without sciatica, Mood swings, Ovarian cyst, Pelvic adhesions, Pelvic pain, Prolactin increased, Rectal bleeding, Type II or unspecified type diabetes mellitus without mention of complication, not stated as uncontrolled, Vocal cord polyps, Vomiting, and Wellness examination. Past Surgical History:  has a past surgical history that includes Cholecystectomy; Tubal ligation; Jordan tooth extraction; Ovary removal; laparoscopy (2013); Hysterectomy (13);  Enterocele repair (9/23/13); Abdominal adhesion surgery (9/23/13); Upper gastrointestinal endoscopy; Esophagoscopy (1/22/15); Colonoscopy (2/4/14); Colonoscopy (7-14-15); Carpal tunnel release (Right, 06/24/2020); and Carpal tunnel release (Right, 6/24/2020). Assessment   Body Structures, Functions, Activity Limitations Requiring Skilled Therapeutic Intervention: Decreased functional mobility ; Decreased ADL status; Decreased body mechanics; Decreased endurance;Decreased safe awareness;Decreased strength;Decreased balance;Decreased coordination;Decreased high-level IADLs  Assessment: Pt ambulates 150 ft with IV pole support and CGA. Pt requires several standing rest breaks during ambulation d/t decreased endurance. Pt is a fall risk d/t decreased balance and endurance, benefitting from 24 hr support for functional mobility. pt would benefit from continued therapy to promote endurance, balance, and strengthening. Therapy Prognosis: Good  Decision Making: Medium Complexity  Barriers to Learning: none  Requires PT Follow-Up: Yes  Activity Tolerance  Activity Tolerance: Patient limited by endurance; Patient limited by fatigue     Plan   Physcial Therapy Plan  General Plan:  (5-6x)  Current Treatment Recommendations: Strengthening, Balance training, Functional mobility training, Transfer training, Stair training, IADL training, ADL/Self-care training, Gait training, Endurance training, Neuromuscular re-education, Equipment evaluation, education, & procurement, Therapeutic activities, Home exercise program, Safety education & training, Patient/Caregiver education & training  Safety Devices  Type of Devices:  All fall risk precautions in place, Call light within reach, Gait belt, Nurse notified, Left in chair (RN notified that pt was in chair with no chair alarm box in room.)  Restraints  Restraints Initially in Place: No     Restrictions  Restrictions/Precautions  Restrictions/Precautions: Fall Risk, Up as Tolerated  Required Braces or Orthoses?: No     Subjective   General  Patient assessed for rehabilitation services?: Yes  Response To Previous Treatment: Not applicable  Family / Caregiver Present: No  Follows Commands: Within Functional Limits  Subjective  Subjective: RN and pt agreeable to PT. pt agreeable and pleasant. pt supine in bed at start of session, c/o no pain. Social/Functional History  Social/Functional History  Lives With: Other (comment) (roomates)  Type of Home: House  Home Layout: Multi-level, Laundry in basement, Bed/Bath upstairs (RHR to second floor)  Home Access: Stairs to enter with rails  Entrance Stairs - Number of Steps: 3  Entrance Stairs - Rails: Right  Bathroom Shower/Tub: Walk-in shower  Bathroom Toilet: Standard  Home Equipment:  (no DME at baseline)  Receives Help From: Family  ADL Assistance: [address] [place] [address] Avenue: Independent  Homemaking Responsibilities: Yes  Ambulation Assistance: Independent  Transfer Assistance: Independent  Active : No  Patient's  Info: roomate drives  Occupation: On disability  Leisure & Hobbies: cook/clean  Additional Comments: Pt reports that her roomates can provide 24 hr support for her at d/c.   Vision/Hearing  Vision  Vision: Impaired  Vision Exceptions: Wears glasses for reading  Hearing  Hearing: Within functional limits    Cognition   Orientation  Overall Orientation Status: Within Functional Limits  Cognition  Overall Cognitive Status: WFL           Gross Assessment  Sensation: Intact (pt denies n/t)     AROM RLE (degrees)  RLE AROM: WFL  AROM LLE (degrees)  LLE AROM : WFL  AROM RUE (degrees)  RUE AROM : WFL  AROM LUE (degrees)  LUE AROM : WFL  Strength RLE  Strength RLE: WFL  Strength LLE  Strength LLE: WFL  Strength RUE  Strength RUE: WFL  Strength LUE  Strength LUE: WFL           Bed mobility  Supine to Sit: Stand by assistance  Sit to Supine: Stand by assistance  Scooting: Stand by assistance  Bed Mobility Comments: HOB elevated  Transfers  Sit to Stand: Contact guard assistance  Stand to Sit: Contact guard assistance  Comment: no AD used  Ambulation  Surface: Level tile  Device: No Device (IV pole for support)  Other Apparatus: O2 (4 L per NC)  Assistance: Contact guard assistance  Gait Deviations: Slow Mariann;Decreased step length;Decreased step height  Distance: 150 ft  Comments: Pt takes several standing rest breaks t/o d/t decreased endurance, uses hallway railing for support during break times. Pt impulsive t/o and needs cues for safety/line awareness with mobility. SpO2 post ambulation 88%, improves to 92 % with 2 minute sitting break. Mildly unsteady.   More Ambulation?: No  Stairs/Curb  Stairs?: No     Balance  Posture: Good  Sitting - Static: Good  Sitting - Dynamic: Good;-  Standing - Static: Fair  Standing - Dynamic: Fair  Comments: Assessed with                                                            AM-PAC Score  AM-PAC Inpatient Mobility Raw Score : 19 (01/08/23 1427)  AM-PAC Inpatient T-Scale Score : 45.44 (01/08/23 1427)  Mobility Inpatient CMS 0-100% Score: 41.77 (01/08/23 1427)  Mobility Inpatient CMS G-Code Modifier : CK (01/08/23 1427)               Goals  Short Term Goals  Time Frame for Short Term Goals: 14 visits  Short Term Goal 1: Complete transfers independently  Short Term Goal 2: Complete 300 ft of gait independently  Short Term Goal 3: Complete full flight of steps with RHR independently  Short Term Goal 4: Participate in 30 minutes of therapy to promote endurance       Education  Patient Education  Education Given To: Patient  Education Provided: Role of Therapy;Plan of Care  Education Method: Demonstration;Verbal  Barriers to Learning: None  Education Outcome: Verbalized understanding;Demonstrated understanding      Therapy Time   Individual Concurrent Group Co-treatment   Time In 1048         Time Out 1117         Minutes 29         Timed Code Treatment Minutes: Conrado Molina PT

## 2023-01-09 PROBLEM — R41.82 ALTERED MENTAL STATUS: Status: RESOLVED | Noted: 2023-01-07 | Resolved: 2023-01-09

## 2023-01-09 PROBLEM — T40.601A OPIATE OVERDOSE (HCC): Status: RESOLVED | Noted: 2023-01-07 | Resolved: 2023-01-09

## 2023-01-09 LAB
ABSOLUTE EOS #: 0 K/UL (ref 0–0.4)
ABSOLUTE IMMATURE GRANULOCYTE: 0 K/UL (ref 0–0.3)
ABSOLUTE LYMPH #: 0.85 K/UL (ref 1–4.8)
ABSOLUTE MONO #: 0.64 K/UL (ref 0.1–0.8)
ALBUMIN SERPL-MCNC: 2.8 G/DL (ref 3.5–5.2)
ALBUMIN/GLOBULIN RATIO: 1.1 (ref 1–2.5)
ALP BLD-CCNC: 62 U/L (ref 35–104)
ALT SERPL-CCNC: 129 U/L (ref 5–33)
ANION GAP SERPL CALCULATED.3IONS-SCNC: 9 MMOL/L (ref 9–17)
ANTI DNA DOUBLE STRANDED: <0.5 IU/ML
ANTI-NUCLEAR ANTIBODY (ANA): NEGATIVE
AST SERPL-CCNC: 249 U/L
BASOPHILS # BLD: 0 % (ref 0–2)
BASOPHILS ABSOLUTE: 0 K/UL (ref 0–0.2)
BILIRUB SERPL-MCNC: 0.5 MG/DL (ref 0.3–1.2)
BILIRUBIN DIRECT: 0.2 MG/DL
BILIRUBIN URINE: NEGATIVE
BILIRUBIN, INDIRECT: 0.3 MG/DL (ref 0–1)
BUN BLDV-MCNC: 35 MG/DL (ref 6–20)
CALCIUM SERPL-MCNC: 8.2 MG/DL (ref 8.6–10.4)
CHLORIDE BLD-SCNC: 107 MMOL/L (ref 98–107)
CHLORIDE, UR: 54 MMOL/L
CO2: 19 MMOL/L (ref 20–31)
COLOR: YELLOW
CREAT SERPL-MCNC: 0.94 MG/DL (ref 0.5–0.9)
CREATININE URINE: 83 MG/DL (ref 28–217)
CULTURE: ABNORMAL
DIRECT EXAM: ABNORMAL
EKG ATRIAL RATE: 108 BPM
EKG ATRIAL RATE: 111 BPM
EKG P AXIS: 62 DEGREES
EKG P AXIS: 71 DEGREES
EKG P-R INTERVAL: 126 MS
EKG P-R INTERVAL: 142 MS
EKG Q-T INTERVAL: 340 MS
EKG Q-T INTERVAL: 350 MS
EKG QRS DURATION: 90 MS
EKG QRS DURATION: 92 MS
EKG QTC CALCULATION (BAZETT): 462 MS
EKG QTC CALCULATION (BAZETT): 469 MS
EKG R AXIS: -4 DEGREES
EKG R AXIS: 9 DEGREES
EKG T AXIS: 41 DEGREES
EKG T AXIS: 52 DEGREES
EKG VENTRICULAR RATE: 108 BPM
EKG VENTRICULAR RATE: 111 BPM
ENA ANTIBODIES SCREEN: 0.2 U/ML
EOSINOPHILS RELATIVE PERCENT: 0 % (ref 1–4)
EPITHELIAL CELLS UA: ABNORMAL /HPF (ref 0–5)
GFR SERPL CREATININE-BSD FRML MDRD: >60 ML/MIN/1.73M2
GLUCOSE BLD-MCNC: 109 MG/DL (ref 65–105)
GLUCOSE BLD-MCNC: 86 MG/DL (ref 65–105)
GLUCOSE BLD-MCNC: 96 MG/DL (ref 70–99)
GLUCOSE URINE: NEGATIVE
HCT VFR BLD CALC: 37.2 % (ref 36.3–47.1)
HEMOGLOBIN: 11.8 G/DL (ref 11.9–15.1)
IMMATURE GRANULOCYTES: 0 %
KETONES, URINE: ABNORMAL
LACTIC ACID, SEPSIS WHOLE BLOOD: 1.4 MMOL/L (ref 0.5–1.9)
LEUKOCYTE ESTERASE, URINE: ABNORMAL
LV EF: 60 %
LVEF MODALITY: NORMAL
LYMPHOCYTES # BLD: 8 % (ref 24–44)
MCH RBC QN AUTO: 29.6 PG (ref 25.2–33.5)
MCHC RBC AUTO-ENTMCNC: 31.7 G/DL (ref 28.4–34.8)
MCV RBC AUTO: 93.5 FL (ref 82.6–102.9)
MONOCYTES # BLD: 6 % (ref 1–7)
MORPHOLOGY: NORMAL
MYOGLOBIN: 481 NG/ML (ref 25–58)
NITRITE, URINE: NEGATIVE
NRBC AUTOMATED: 0 PER 100 WBC
PDW BLD-RTO: 14.4 % (ref 11.8–14.4)
PH UA: 6 (ref 5–8)
PLATELET # BLD: 132 K/UL (ref 138–453)
PMV BLD AUTO: 10.8 FL (ref 8.1–13.5)
POTASSIUM SERPL-SCNC: 3.9 MMOL/L (ref 3.7–5.3)
PROTEIN UA: ABNORMAL
RBC # BLD: 3.98 M/UL (ref 3.95–5.11)
RBC UA: ABNORMAL /HPF (ref 0–2)
SEG NEUTROPHILS: 86 % (ref 36–66)
SEGMENTED NEUTROPHILS ABSOLUTE COUNT: 9.11 K/UL (ref 1.8–7.7)
SODIUM BLD-SCNC: 135 MMOL/L (ref 135–144)
SODIUM,UR: 22 MMOL/L
SPECIFIC GRAVITY UA: 1.02 (ref 1–1.03)
SPECIMEN DESCRIPTION: ABNORMAL
TOTAL CK: 6908 U/L (ref 26–192)
TOTAL PROTEIN, URINE: 31 MG/DL
TOTAL PROTEIN: 5.4 G/DL (ref 6.4–8.3)
TRICHOMONAS: ABNORMAL
TURBIDITY: CLEAR
URINE HGB: ABNORMAL
URINE TOTAL PROTEIN CREATININE RATIO: 0.37 (ref 0–0.2)
UROBILINOGEN, URINE: NORMAL
WBC # BLD: 10.6 K/UL (ref 3.5–11.3)
WBC UA: ABNORMAL /HPF (ref 0–5)

## 2023-01-09 PROCEDURE — 6360000002 HC RX W HCPCS: Performed by: STUDENT IN AN ORGANIZED HEALTH CARE EDUCATION/TRAINING PROGRAM

## 2023-01-09 PROCEDURE — 80048 BASIC METABOLIC PNL TOTAL CA: CPT

## 2023-01-09 PROCEDURE — 83605 ASSAY OF LACTIC ACID: CPT

## 2023-01-09 PROCEDURE — 6360000002 HC RX W HCPCS: Performed by: INTERNAL MEDICINE

## 2023-01-09 PROCEDURE — 83874 ASSAY OF MYOGLOBIN: CPT

## 2023-01-09 PROCEDURE — C9113 INJ PANTOPRAZOLE SODIUM, VIA: HCPCS | Performed by: INTERNAL MEDICINE

## 2023-01-09 PROCEDURE — 2580000003 HC RX 258: Performed by: STUDENT IN AN ORGANIZED HEALTH CARE EDUCATION/TRAINING PROGRAM

## 2023-01-09 PROCEDURE — 99232 SBSQ HOSP IP/OBS MODERATE 35: CPT | Performed by: INTERNAL MEDICINE

## 2023-01-09 PROCEDURE — 36415 COLL VENOUS BLD VENIPUNCTURE: CPT

## 2023-01-09 PROCEDURE — 80076 HEPATIC FUNCTION PANEL: CPT

## 2023-01-09 PROCEDURE — 2580000003 HC RX 258: Performed by: INTERNAL MEDICINE

## 2023-01-09 PROCEDURE — 2580000003 HC RX 258

## 2023-01-09 PROCEDURE — 82550 ASSAY OF CK (CPK): CPT

## 2023-01-09 PROCEDURE — 81001 URINALYSIS AUTO W/SCOPE: CPT

## 2023-01-09 PROCEDURE — 6370000000 HC RX 637 (ALT 250 FOR IP): Performed by: INTERNAL MEDICINE

## 2023-01-09 PROCEDURE — 93306 TTE W/DOPPLER COMPLETE: CPT

## 2023-01-09 PROCEDURE — 85025 COMPLETE CBC W/AUTO DIFF WBC: CPT

## 2023-01-09 PROCEDURE — 2060000000 HC ICU INTERMEDIATE R&B

## 2023-01-09 PROCEDURE — 82947 ASSAY GLUCOSE BLOOD QUANT: CPT

## 2023-01-09 PROCEDURE — 6360000002 HC RX W HCPCS

## 2023-01-09 RX ORDER — SODIUM BICARBONATE 650 MG/1
650 TABLET ORAL 2 TIMES DAILY
Status: DISCONTINUED | OUTPATIENT
Start: 2023-01-09 | End: 2023-01-10 | Stop reason: HOSPADM

## 2023-01-09 RX ORDER — METOCLOPRAMIDE HYDROCHLORIDE 5 MG/ML
10 INJECTION INTRAMUSCULAR; INTRAVENOUS
Status: DISCONTINUED | OUTPATIENT
Start: 2023-01-09 | End: 2023-01-10

## 2023-01-09 RX ADMIN — HEPARIN SODIUM 5000 UNITS: 5000 INJECTION INTRAVENOUS; SUBCUTANEOUS at 13:56

## 2023-01-09 RX ADMIN — SODIUM BICARBONATE 650 MG: 648 TABLET ORAL at 15:49

## 2023-01-09 RX ADMIN — PIPERACILLIN AND TAZOBACTAM 3375 MG: 3; .375 INJECTION, POWDER, FOR SOLUTION INTRAVENOUS at 05:57

## 2023-01-09 RX ADMIN — SODIUM CHLORIDE, PRESERVATIVE FREE 10 ML: 5 INJECTION INTRAVENOUS at 08:00

## 2023-01-09 RX ADMIN — HEPARIN SODIUM 5000 UNITS: 5000 INJECTION INTRAVENOUS; SUBCUTANEOUS at 20:31

## 2023-01-09 RX ADMIN — METOCLOPRAMIDE 10 MG: 5 INJECTION, SOLUTION INTRAMUSCULAR; INTRAVENOUS at 20:31

## 2023-01-09 RX ADMIN — METOCLOPRAMIDE 10 MG: 5 INJECTION, SOLUTION INTRAMUSCULAR; INTRAVENOUS at 05:55

## 2023-01-09 RX ADMIN — METOCLOPRAMIDE 10 MG: 5 INJECTION, SOLUTION INTRAMUSCULAR; INTRAVENOUS at 02:12

## 2023-01-09 RX ADMIN — SODIUM CHLORIDE, PRESERVATIVE FREE 40 MG: 5 INJECTION INTRAVENOUS at 07:59

## 2023-01-09 RX ADMIN — SODIUM CHLORIDE, PRESERVATIVE FREE 40 MG: 5 INJECTION INTRAVENOUS at 20:31

## 2023-01-09 RX ADMIN — SODIUM CHLORIDE, PRESERVATIVE FREE 10 ML: 5 INJECTION INTRAVENOUS at 20:31

## 2023-01-09 RX ADMIN — SODIUM BICARBONATE 650 MG: 648 TABLET ORAL at 20:31

## 2023-01-09 RX ADMIN — SODIUM CHLORIDE: 9 INJECTION, SOLUTION INTRAVENOUS at 13:12

## 2023-01-09 RX ADMIN — PIPERACILLIN AND TAZOBACTAM 3375 MG: 3; .375 INJECTION, POWDER, FOR SOLUTION INTRAVENOUS at 13:13

## 2023-01-09 RX ADMIN — METOCLOPRAMIDE 10 MG: 5 INJECTION, SOLUTION INTRAMUSCULAR; INTRAVENOUS at 16:52

## 2023-01-09 RX ADMIN — METOCLOPRAMIDE 10 MG: 5 INJECTION, SOLUTION INTRAMUSCULAR; INTRAVENOUS at 13:14

## 2023-01-09 ASSESSMENT — ENCOUNTER SYMPTOMS
CONSTIPATION: 0
CHOKING: 0
VOMITING: 0
SHORTNESS OF BREATH: 0
COUGH: 0
DIARRHEA: 0
NAUSEA: 0
CHEST TIGHTNESS: 0
ABDOMINAL PAIN: 0

## 2023-01-09 NOTE — PROGRESS NOTES
[facility]  Progress Note                   Date:   1/9/2023  Patient name: Courtney Allen  Date of admission:  1/7/2023 12:27 PM  MRN:   [id number]  YOB: 1980  PCP: Grace Marquez DO    Reason for Admission: Narcotic overdose, accidental or unintentional, initial encounter ([facility]) [T40.601A]  Opiate overdose, undetermined intent, initial encounter ([facility]) [T40.604A]  Altered mental status, unspecified [R41.82]    Subjective:       Clinical Changes /Abnormalities: Patient seen and examined in room. She denies chest pain and SOB. Labs/vitals/tele reviewed. Discussed with RN, no acute CV issues/concerns overnight. ECHO pending.     Review of Systems    Medications:   Scheduled Meds:   piperacillin-tazobactam  3,375 mg IntraVENous Q8H    pantoprazole (PROTONIX) 40 mg injection  40 mg IntraVENous Q12H    metoclopramide  10 mg IntraVENous Q6H    sodium chloride  20 mL/kg IntraVENous Once    sodium chloride flush  5-40 mL IntraVENous 2 times per day    [Held by provider] heparin (porcine)  5,000 Units SubCUTAneous 3 times per day    insulin regular  10 Units IntraVENous Once    And    dextrose bolus  250 mL IntraVENous Once     Continuous Infusions:   sodium bicarbonate infusion 150 mL/hr at 01/08/23 2206    sodium chloride 10 mL/hr at 01/08/23 1412    dextrose       CBC:   Recent Labs     01/07/23  1245 01/08/23  1033 01/09/23  0031   WBC 26.7* 18.8* 10.6   HGB 17.2* 12.8 11.8*    169 132*     BMP:    Recent Labs     01/07/23  2314 01/08/23  0119 01/08/23  1033 01/09/23  0031     --  137 135   K 5.4* 5.1 4.8 3.9     --  108* 107   CO2 14*  --  15* 19*   BUN 49*  --  58* 35*   CREATININE 1.95*  --  1.72* 0.94*   GLUCOSE 112*  --  137* 96     Hepatic:  Recent Labs     01/07/23  1245 01/09/23  0031   * 249*   ALT 83* 129*   BILITOT 0.3 0.5   ALKPHOS 220* 62     Troponin:   Recent Labs     01/07/23  1358 01/07/23  2314 01/08/23  1033   TROPHS 110* 104* 68*     BNP:  No results for input(s): BNP in the last 72 hours. Lipids: No results for input(s): CHOL, HDL in the last 72 hours. Invalid input(s): LDLCALCU  INR: No results for input(s): INR in the last 72 hours. Objective:   Vitals: BP (!) 146/92   Pulse 84   Temp 100.1 °F (37.8 °C) (Temporal)   Resp 14   Ht 5' 2\" (1.575 m)   Wt 166 lb 0.1 oz (75.3 kg)   LMP 05/30/2013   SpO2 94%   BMI 30.36 kg/m²   General appearance: alert and cooperative with exam  HEENT: Head: Normocephalic, no lesions, without obvious abnormality. Neck:no JVD, trachea midline, no adenopathy  Lungs: Clear to auscultation  Heart: Regular rate and rhythm, s1/s2 auscultated, no murmurs, SR HR 79  Abdomen: soft, non-tender, bowel sounds active  Extremities: no edema  Neurologic: not done        Assessment / Acute Cardiac Problems:   Elevated troponin  Substance abuse  FUNMILAYO  Rhabdomyolysis    Patient Active Problem List:     Hypercholesteremia     Panic anxiety syndrome     DM (diabetes mellitus) (HCC)     IBS (irritable bowel syndrome)     Mood swings     Hypothyroidism     Constipation     Endometriosis     Agoraphobia     HETAL RS with enterocele and Ovarian Preservation 9/23/13     Left ankle pain     Diarrhea     Vomiting     Rectal bleeding     Midline low back pain without sciatica     Irritable bowel syndrome without diarrhea     Gastroesophageal reflux disease      Abdominal pain, generalized     Carpal tunnel syndrome on right     Major depression with psychotic features ([address]     Bipolar disorder with psychotic features ([address]     Cocaine use     Hx of diabetes mellitus     Opiate overdose ([address]     Altered mental status     Acute respiratory failure with hypoxia (HCC)     Elevated troponin     Rhabdomyolysis     FUNMILAYO (acute kidney injury) ([address]     Hyperkalemia     Esophageal dilatation     Cocaine abuse ([address]     Metabolic acidosis     Polysubstance abuse ([address]     Arterial hypotension      Plan of Treatment:   ECHO pending. Stable. Troponin elevated, trending down. She denies chest pain and shortness of breath. Rhabdomyolysis/FUNMILAYO. Nephrology on board. Rest of care per primary.      Electronically signed by PHILIP Concepcion CNP on 1/9/2023 at 11:47 AM  [address]  [phone]

## 2023-01-09 NOTE — PROGRESS NOTES
[facility]  Internal Medicine Teaching Residency Program  Inpatient Daily Progress Note  ______________________________________________________________________________    Patient: Noy Bender  YOB: 1980   XVP:[id number]    Acct: [de-identified]     Room: [address] [place] [address]  Admit date: 1/7/2023  Today's date: 01/09/23  Number of days in the hospital: 2    SUBJECTIVE   Admitting Diagnosis: Altered mental status  CC: Altered mental status    No acute events happened overnight. Patient examined at bedside today. Labs and chart reviewed. Patient denies any symptom. Patient denies any shortness of breath, chest pain or belly pain. Patient denies any depressive symptom, intention to harm herself or others, patient also denies any suicidal ideation. Upon asking, patient states that she had no intention to harm or suicide herself when she had drug overdose. Blood pressure 120/60, heart rate 83/min, oxygen saturation of greater than 95% on 4 L nasal cannula, de-escalated from 6 L. Review of Systems   Constitutional:  Negative for appetite change, fatigue and fever. Respiratory:  Negative for cough, choking, chest tightness and shortness of breath. Cardiovascular:  Negative for chest pain. Gastrointestinal:  Negative for abdominal pain, constipation, diarrhea, nausea and vomiting. Neurological:  Negative for weakness, light-headedness and headaches. BRIEF HISTORY     Patient, 38years old female, presented to the hospital after being found down. Patient is unable to provide her history, stating that she does not remember what happened, she states that she can only remember that she went to her bed last night, followed by waking up in the hospital.     According to the charts, patient was found down, Narcan was given 2 Mg IV followed by 2 Mg IM with improvement in mentation.   At initial presentation patient was not maintaining her oxygen saturation on room air, 79% after which she was put on 4 L nasal cannula and her oxygen saturation improved. In ED, WBC count 26.7, hemoglobin 17.2, magnesium 2.6, troponin level initially 125, followed by 110. CK1 512, myoglobin 6930. Blood culture drawn twice negative. Lactate 4.8 followed by 4.2, urine drug screen positive for cocaine, fentanyl and cannabinoid. Chest x-ray showed diffuse left lung infiltrate most likely pneumonia.  : CT chest shows left sided pneumonea with dilated esophagus, will get abdominal CT and GI consult, BMP shows worsening FUNMILAYO    : Cardiology recommends echo to assess LVEF. Nephrology recommends to continue bicarb drip. CT abdomen findings suggestive of gastroparesis. GI recommends to start patient on Reglan and trial of clear liquid diet. Patient may be considered for EGD, pending patient's symptom cardiac work-up for elevated troponin. Protonix 40 Mg IV twice daily. HbA1c 6.2. CK trending upward and myoglobin, creatinine and urea trending downward. OBJECTIVE     Vital Signs:  BP (!) 146/92   Pulse 84   Temp 100.1 °F (37.8 °C) (Temporal)   Resp 14   Ht 5' 2\" (1.575 m)   Wt 166 lb 0.1 oz (75.3 kg)   LMP 2013   SpO2 94%   BMI 30.36 kg/m²     Temp (24hrs), Av.7 °F (37.1 °C), Min:98.1 °F (36.7 °C), Max:100.1 °F (37.8 °C)    In: 344.5   Out: 1300 [Urine:1300]    Physical Exam  Vitals and nursing note reviewed. Constitutional:       General: She is awake. Interventions: Nasal cannula in place. Cardiovascular:      Heart sounds: Normal heart sounds. Pulmonary:      Effort: Pulmonary effort is normal.      Breath sounds: Normal breath sounds. Chest:      Chest wall: No tenderness. Abdominal:      Palpations: Abdomen is soft. Tenderness: There is no abdominal tenderness. Musculoskeletal:      Right lower leg: No edema. Left lower leg: No edema.    Neurological:      Mental Status: She is alert and oriented to person, place, and time. Psychiatric:         Mood and Affect: Mood normal. Mood is not depressed. Behavior: Behavior is cooperative. Thought Content: Thought content does not include homicidal or suicidal ideation. Medications:  Scheduled Medications:    piperacillin-tazobactam  3,375 mg IntraVENous Q8H    pantoprazole (PROTONIX) 40 mg injection  40 mg IntraVENous Q12H    metoclopramide  10 mg IntraVENous Q6H    sodium chloride  20 mL/kg IntraVENous Once    sodium chloride flush  5-40 mL IntraVENous 2 times per day    [Held by provider] heparin (porcine)  5,000 Units SubCUTAneous 3 times per day    insulin regular  10 Units IntraVENous Once    And    dextrose bolus  250 mL IntraVENous Once     Continuous Infusions:    sodium bicarbonate infusion 150 mL/hr at 01/08/23 2206    sodium chloride 10 mL/hr at 01/09/23 1312    dextrose       PRN Medicationssodium chloride flush, 5-40 mL, PRN  sodium chloride, , PRN  ondansetron, 4 mg, Q8H PRN   Or  ondansetron, 4 mg, Q6H PRN  polyethylene glycol, 17 g, Daily PRN  acetaminophen, 650 mg, Q6H PRN   Or  acetaminophen, 650 mg, Q6H PRN  glucose, 4 tablet, PRN  dextrose bolus, 125 mL, PRN   Or  dextrose bolus, 250 mL, PRN  glucagon (rDNA), 1 mg, PRN  dextrose, , Continuous PRN        Diagnostic Labs:  CBC:   Recent Labs     01/07/23  1245 01/08/23  1033 01/09/23  0031   WBC 26.7* 18.8* 10.6   RBC 5.82* 4.30 3.98   HGB 17.2* 12.8 11.8*   HCT 56.5* 40.1 37.2   MCV 97.1 93.3 93.5   RDW 13.9 14.4 14.4    169 132*     BMP:   Recent Labs     01/07/23  2314 01/08/23  0119 01/08/23  1033 01/09/23  0031     --  137 135   K 5.4* 5.1 4.8 3.9     --  108* 107   CO2 14*  --  15* 19*   BUN 49*  --  58* 35*   CREATININE 1.95*  --  1.72* 0.94*     BNP: No results for input(s): BNP in the last 72 hours. PT/INR: No results for input(s): PROTIME, INR in the last 72 hours. APTT: No results for input(s): APTT in the last 72 hours.   CARDIAC ENZYMES: No results for input(s): CKMB, CKMBINDEX, TROPONINI in the last 72 hours. Invalid input(s): CKTOTAL;3  FASTING LIPID PANEL:  Lab Results   Component Value Date    CHOL 148 09/01/2021    HDL 37 (L) 09/01/2021    TRIG 164 (H) 09/01/2021     LIVER PROFILE:   Recent Labs     01/07/23  1245 01/09/23  0031   * 249*   ALT 83* 129*   BILIDIR  --  0.2   BILITOT 0.3 0.5   ALKPHOS 220* 62      MICROBIOLOGY:   Lab Results   Component Value Date/Time    CULTURE  01/08/2023 01:25 PM     PRESUMPTIVE ID: Susan albicans/dubliniensis HEAVY GROWTH       Imaging:    CT ABDOMEN PELVIS WO CONTRAST Additional Contrast? None    Result Date: 1/8/2023  1. Moderately dilated and fluid-filled distal esophagus and stomach, slightly improved when compared to the previous CT chest dated 01/07/2023. There is no abnormal small bowel distention or evidence of obstructing gastric mass, and findings may be related to gastroparesis. 2.  Unchanged patchy airspace consolidation at the visualized lung bases. 3.  Hepatic steatosis. CT CHEST WO CONTRAST    Result Date: 1/8/2023  Diffuse patchy left lung pneumonia with minimal involvement on the right. The esophagus is fluid-filled and markedly dilated measuring 5.5 cm distally. Stomach has a similar appearance. This may extend into the partially visualized proximal most duodenum. Follow-up postcontrast CT abdomen/pelvis recommended for further evaluation. Evidence of old granulomatous disease. US RENAL COMPLETE    Result Date: 1/8/2023  No acute pathology or hydronephrosis noted. XR CHEST PORTABLE    Result Date: 1/7/2023  Diffuse left lung infiltrate could represent pneumonia       ASSESSMENT & PLAN     Principal Problem (Resolved):     Altered mental status  Active Problems:    Acute respiratory failure with hypoxia (HCC)    Elevated troponin    Rhabdomyolysis    FUNMILAYO (acute kidney injury) ([address]    Hyperkalemia    Esophageal dilatation    Metabolic acidosis    Polysubstance abuse ([address] Arterial hypotension    Gastroesophageal reflux disease   Resolved Problems:    Opiate overdose (HCC)    Acute respiratory failure with hypoxia (HCC)  Plan:   Improved. Was likely secondary to drug overdose. Nasal cannula de-escalated from 6 L to 4 L, oxygen saturation above 95%. Follow-up with an echo  Lactic acid resolved, 1.6  Respiratory culture growing Candida albicans/dubliniensis heavy growth, will consult ID. Elevated troponin  Plan:   Most likely secondary to cocaine overdose, demand ischemia  Patient denies any chest pain  Troponin 125->110->104->68  Follow-up with repeat level  Cardiology following, appreciate recommendations. Follow-up with echo. Rhabdomyolysis  Plan:   Nephrology following, appreciate recommendations  Continue bicarb drip  Strict DIEGO's  CK trending down. 5573->54731->5878  Myoglobin trending down. 0050->1072  BUN/creatinine trending down 58>35/1.95>0.9      FUNMILAYO (acute kidney injury) ([facility]  Plan:   2/2 rhabdomyolysis  Nephrology following, appreciate recommendations  Continue bicarb drip  Strict DIEGO's  CK trending down. 3779->66875->8244  Myoglobin trending down. 5123->8999  BUN/creatinine trending down 58>35/1.95>0.9    Altered mental status  Plan:   Resolved  Notes likely secondary to polysubstance abuse, UDS showing cocaine positive, cannabinoid positive and fentanyl positive. Patient oriented x3, denies any suicidal intention and thoughts. Continue to monitor and follow-up with BMPs. Replace as needed    Esophageal dilatation  Plan:   CT chest showing dilated esophagus, CT abdomen suggestive of gastroparesis  GI consulted, appreciate recommendations. Patient on Reglan 10 Mg every 6 hours IV  Plan for EGD after cardiac work-up is done  Protonix 40 Mg IV twice daily    Metabolic acidosis  Plan:   Secondary to rhabdomyolysis, FUNMILAYO  Nephrology following, appreciate recommendations.   Patient currently on bicarb drip    Polysubstance abuse [facility])  Plan:   Patient counseled.    Gastroesophageal reflux disease   Plan:   Protonix 40 Mg IV twice daily    Opiate overdose (HCC)  Plan:   Improved.  Mentation improved by giving Narcan  UDS positive for cocaine, cannabinoid and fentanyl  Monitor for respiratory status    Plan for today:  Continue current treatment, appreciate nephrology and cardiology recommendations, follow-up with echo.    Diet: Adult diet clear liquid  DVT ppx : Heparin subcu  GI ppx: Protonix 40 Mg IV twice daily    PT/OT/SW: PT OT following, appreciate recommendations.  Patient is followed at risk with decreased balance and endurance, will benefit from  Ability.   Discharge Planning:  consulted, appreciate recommendations.    Keenan Hutchinson M.D.  Internal Medicine Resident PGY-1  [facility],  [place], [place].    1:21 PM 1/9/2023     Please note that part of this chart was generated using voice recognition dictation software. Although every effort was made to ensure the accuracy of this automated transcription, some errors in transcription may have occurred.

## 2023-01-09 NOTE — DISCHARGE INSTR - COC
Continuity of Care Form    Patient Name: Raissa Mart   :  1980  MRN:  [id number]    Admit date:  2023  Discharge date:  ***    Code Status Order: Full Code   Advance Directives:     Admitting Physician:  Ximena Cevallos MD  PCP: Porfirio West DO    Discharging Nurse: [facility]/Room#: 6942/8833-41  Discharging Unit Phone Number: ***    Emergency Contact:   Extended Emergency Contact Information  Primary Emergency Contact: [address] [place] [address] Phone: [phone]  Mobile Phone: [phone]  Relation: Other    Past Surgical History:  Past Surgical History:   Procedure Laterality Date    ABDOMINAL ADHESION SURGERY  13    CARPAL TUNNEL RELEASE Right 2020    CARPAL TUNNEL RELEASE Right 2020    CARPAL TUNNEL RELEASE (SUPINE) 3080 TABLE, ARM TABLE performed by Unique Araya DO at [facility]      COLONOSCOPY  14    Benign biopsies    COLONOSCOPY  7-14-15    incomplete/poor prep, hemorrhoids. ENTEROCELE REPAIR  13    ESOPHAGOSCOPY  1/22/15    HYSTERECTOMY (CERVIX STATUS UNKNOWN)  13    HETAL, Right Salpingectomy, Enterocele, SHON    LAPAROSCOPY  2013    Diagnostic converted to [address]       due to cyst- right    TUBAL LIGATION      UPPER GASTROINTESTINAL ENDOSCOPY      WISDOM TOOTH EXTRACTION      x4       Immunization History: There is no immunization history on file for this patient.     Active Problems:  Patient Active Problem List   Diagnosis Code    Hypercholesteremia E78.00    Panic anxiety syndrome F41.0    DM (diabetes mellitus) (HCC) E11.9    IBS (irritable bowel syndrome) K58.9    Mood swings R45.86    Hypothyroidism E03.9    Constipation K59.00    Endometriosis N80.9    Agoraphobia F40.00    HETAL RS with enterocele and Ovarian Preservation 13 Z90.710    Left ankle pain M25.572    Diarrhea R19.7    Vomiting R11.10    Rectal bleeding K62.5    Midline low back pain without sciatica M54.50 Irritable bowel syndrome without diarrhea K58.9    Gastroesophageal reflux disease  K21.9    Abdominal pain, generalized R10.84    Carpal tunnel syndrome on right G56.01    Major depression with psychotic features ([facility]) F32.3    Bipolar disorder with psychotic features ([facility]) F31.9    Cocaine use F14.90    Hx of diabetes mellitus Z86.39    [facility] ([facility] T40.601A    Altered mental status R41.82    Acute respiratory failure with hypoxia ([facility]) J96.01    Elevated troponin R77.8    Rhabdomyolysis M62.82    FUNMILAYO (acute kidney injury) ([facility] N17.9    Hyperkalemia E87.5    Esophageal dilatation K22.89    Cocaine abuse ([facility]) G65.97    Metabolic acidosis V54.24    Polysubstance abuse ([facility]) F19.10    Arterial hypotension I95.9       Isolation/Infection:   Isolation            No Isolation          Patient Infection Status       Infection Onset Added Last Indicated Last Indicated By Review Planned Expiration Resolved Resolved By    None active    Resolved    COVID-19 (Rule Out) 09/25/22 09/25/22 09/25/22 COVID-19, Rapid (Ordered)   09/25/22 Rule-Out Test Resulted    COVID-19 (Rule Out) 06/20/20 06/20/20 06/20/20 COVID-19 (Ordered)   06/22/20 Rule-Out Test Resulted            Nurse Assessment:  Last Vital Signs: BP (!) 146/92   Pulse 84   Temp 100.1 °F (37.8 °C) (Temporal)   Resp 14   Ht 5' 2\" (1.575 m)   Wt 166 lb 0.1 oz (75.3 kg)   LMP 05/30/2013   SpO2 94%   BMI 30.36 kg/m²     Last documented pain score (0-10 scale): Pain Level:  (no)  Last Weight:   Wt Readings from Last 1 Encounters:   01/09/23 166 lb 0.1 oz (75.3 kg)     Mental Status:  {IP PT MENTAL STATUS:69690}    IV Access:  {[facility] IV ACCESS:188037649}    Nursing Mobility/ADLs:  Walking   {CHP DME SFQI:316242684}  Transfer  {CHP DME UBSX:450798702}  Bathing  {CHP DME OIRX:110145966}  Dressing  {CHP DME SSVO:564892124}  Toileting  {CHP DME AUTS:009517831}  Feeding  {CHINA LUDWIG WHDJ:158259867}  Med Admin  {CH[employer] LUDWIG JDWQ:911704267}  Med Delivery   { AARON MED ZSLTXBKE:882822192}    Wound Care Documentation and Therapy:  Incision 20 Wrist Right (Active)   Number of days: 929        Elimination:  Continence: Bowel: {YES / BW:56386}  Bladder: {YES / DN:89802}  Urinary Catheter: {Urinary Catheter:412199944}   Colostomy/Ileostomy/Ileal Conduit: {YES / UY:65838}       Date of Last BM: ***    Intake/Output Summary (Last 24 hours) at 2023 1323  Last data filed at 2023 0946  Gross per 24 hour   Intake 344.52 ml   Output 950 ml   Net -605.48 ml     I/O last 3 completed shifts:   In: 154.5 [I.V.:10; IV Piggyback:144.5]  Out: 900 [Urine:900]    Safety Concerns:     [employer] Safety Concerns:198860113}    Impairments/Disabilities:      [employer] Impairments/Disabilities:170028833}    Nutrition Therapy:  Current Nutrition Therapy:   [employer] Diet List:810433306}    Routes of Feeding: {CHP DME Other Feedings:519315083}  Liquids: {Slp liquid thickness:55309}  Daily Fluid Restriction: {CHP DME Yes amt example:507018705}  Last Modified Barium Swallow with Video (Video Swallowing Test): {Done Not Done WO:666075375}    Treatments at the Time of Hospital Discharge:   Respiratory Treatments: ***  Oxygen Therapy:  {Therapy; copd oxygen:15266}  Ventilator:    { CC Vent EKSQ:728136184}    Rehab Therapies: {THERAPEUTIC INTERVENTION:8834521304}  Weight Bearing Status/Restrictions: [employer] Weight Bearin}  Other Medical Equipment (for information only, NOT a DME order):  {EQUIPMENT:458109551}  Other Treatments: ***    Patient's personal belongings (please select all that are sent with patient):  {P DME Belongings:523503253}    RN SIGNATURE:  {Esignature:924832845}    CASE MANAGEMENT/SOCIAL WORK SECTION    Inpatient Status Date: ***    Readmission Risk Assessment Score:  Readmission Risk              Risk of Unplanned Readmission:  21           Discharging to Facility/ Agency   Name:   Address:  Phone:  Fax:    Dialysis Facility (if applicable)   Name:  Address:  Dialysis Schedule:  Phone:  Fax:    / signature: {Esignature:706833967}    PHYSICIAN SECTION    Prognosis: {Prognosis:5433827387}    Condition at Discharge: Heather Snell Patient Condition:072555154}    Rehab Potential (if transferring to Rehab): {Prognosis:3037618329}    Recommended Labs or Other Treatments After Discharge: ***    Physician Certification: I certify the above information and transfer of Valerio Crawley  is necessary for the continuing treatment of the diagnosis listed and that she requires {Admit to Appropriate Level of Care:63115} for {GREATER/LESS:915554270} 30 days.      Update Admission H&P: [facility]}    PHYSICIAN SIGNATURE:  {Esignature:524891850}

## 2023-01-09 NOTE — PROGRESS NOTES
[facility]. [facility] Progress Note    Saurabh Munguia is a 38 y.o. female patient. Hospitalization Day:2      Chief consult reason: Esophageal dilation      Subjective: Patient seen and examined. Patient reports tolerating clear liquid diet, no nausea or vomiting. Patient denies bowel movement today but denies constipation as well. Objective:   VITALS:  BP (!) 146/92   Pulse 84   Temp 100.1 °F (37.8 °C) (Temporal)   Resp 14   Ht 5' 2\" (1.575 m)   Wt 166 lb 0.1 oz (75.3 kg)   LMP 2013   SpO2 94%   BMI 30.36 kg/m²   TEMPERATURE:  Current - Temp: 100.1 °F (37.8 °C);  Max - Temp  Av.7 °F (37.1 °C)  Min: 98.1 °F (36.7 °C)  Max: 100.1 °F (37.8 °C)    Physical Assessment:  General appearance:  alert, cooperative and no distress  Mental Status:  oriented to person, place and time and normal affect  Lungs:  clear to auscultation bilaterally, normal effort  Heart:  regular rate and rhythm, no murmur  Abdomen:  soft, obese, nontender, nondistended, normal bowel sounds, no masses  Extremities:  no edema, no redness, No clubbing  Skin:  warm, dry, no gross lesions or rashes    CURRENT MEDICATIONS:  Scheduled Meds:   piperacillin-tazobactam  3,375 mg IntraVENous Q8H    pantoprazole (PROTONIX) 40 mg injection  40 mg IntraVENous Q12H    metoclopramide  10 mg IntraVENous Q6H    sodium chloride  20 mL/kg IntraVENous Once    sodium chloride flush  5-40 mL IntraVENous 2 times per day    heparin (porcine)  5,000 Units SubCUTAneous 3 times per day    insulin regular  10 Units IntraVENous Once    And    dextrose bolus  250 mL IntraVENous Once     Continuous Infusions:   sodium bicarbonate infusion 150 mL/hr at 23 2206    sodium chloride 10 mL/hr at 23 1312    dextrose       PRN Meds:sodium chloride flush, sodium chloride, ondansetron **OR** ondansetron, polyethylene glycol, acetaminophen **OR** acetaminophen, glucose, dextrose bolus **OR** dextrose bolus, glucagon (rDNA), dextrose      Data Review:  LABS and IMAGING:     CBC  Recent Labs     01/07/23  1245 01/08/23  1033 01/09/23  0031   WBC 26.7* 18.8* 10.6   HGB 17.2* 12.8 11.8*   HCT 56.5* 40.1 37.2   MCV 97.1 93.3 93.5   MCHC 30.4 31.9 31.7   RDW 13.9 14.4 14.4    169 132*       Immature PLTs   No results found for: PLTFLUORE    ANEMIA STUDIES  No results for input(s): LABIRON, TIBC, IRON, FERRITIN, NXAQORVA99, FOLATE, OCCULTBLD in the last 72 hours. BMP  Recent Labs     01/07/23  2314 01/08/23  0119 01/08/23  1033 01/09/23  0031     --  137 135   K 5.4* 5.1 4.8 3.9     --  108* 107   CO2 14*  --  15* 19*   BUN 49*  --  58* 35*   CREATININE 1.95*  --  1.72* 0.94*   GLUCOSE 112*  --  137* 96   CALCIUM 8.0*  --  8.1* 8.2*       LFTS  Recent Labs     01/07/23  1245 01/09/23  0031   ALKPHOS 220* 62   ALT 83* 129*   * 249*   BILITOT 0.3 0.5   BILIDIR  --  0.2   LABALBU 4.4 2.8*       AMYLASE/LIPASE/AMMONIA  No results for input(s): AMYLASE, LIPASE, AMMONIA in the last 72 hours.     Acute Hepatitis Panel   Lab Results   Component Value Date/Time    HEPBSAG NONREACTIVE 01/08/2023 07:22 PM    HEPCAB NONREACTIVE 01/08/2023 07:22 PM    HEPBIGM NONREACTIVE 01/08/2023 07:22 PM    HEPAIGM NONREACTIVE 01/08/2023 07:22 PM       HCV Genotype   No results found for: HEPATITISCGENOTYPE    HCV Quantitative   No results found for: HCVQNT    LIVER WORK UP:    AFP  Lab Results   Component Value Date/Time    AFP 1.4 05/19/2016 11:54 AM       Alpha 1 antitrypsin   No results found for: A1A    Anti - Liver/Kidney Ab  No results found for: LIVER-KIDNEYMICROSOMALAB    ADRIEL  Lab Results   Component Value Date/Time    ADRIEL NEGATIVE 01/08/2023 07:22 PM       AMA  Lab Results   Component Value Date/Time    MITOAB 1.7 05/19/2016 11:54 AM       ASMA  Lab Results   Component Value Date/Time    SMOOTHMUSCAB 4 05/19/2016 11:54 AM       Ceruloplasmin  No results found for: CERULOPLSM    Celiac panel  No results found for: TISSTRNTIIGG, TTGIGA, IGA    IgG  No results found for: IGG    IgM  No results found for: IGM    GGT   No results found for: LABGGT    PT/INR  No results for input(s): PROTIME, INR in the last 72 hours.    Cancer Markers:  CEA:  No results found for: CEA  Ca 125:  No results found for:   Ca 19-9:   No results found for:   AFP:   Lab Results   Component Value Date/Time    AFP 1.4 05/19/2016 11:54 AM       Lactic acid:No results for input(s): LACTACIDWB in the last 72 hours.      Radiology Review:    CT ABDOMEN PELVIS WO CONTRAST Additional Contrast? None    Result Date: 1/8/2023  EXAMINATION: CT OF THE ABDOMEN AND PELVIS WITHOUT CONTRAST 1/8/2023 8:46 am TECHNIQUE: CT of the abdomen and pelvis was performed without the administration of intravenous contrast. Multiplanar reformatted images are provided for review. Automated exposure control, iterative reconstruction, and/or weight based adjustment of the mA/kV was utilized to reduce the radiation dose to as low as reasonably achievable. COMPARISON: CT chest dated 01/07/2023.  CT abdomen and pelvis dated 01/15/2020. HISTORY: ORDERING SYSTEM PROVIDED HISTORY: esophagus stomach dilatation TECHNOLOGIST PROVIDED HISTORY: Esophagus stomach dilatation Is the patient pregnant?->No Reason for Exam: STOMACH ESOPHAGUS DILATATION FINDINGS: Lower Chest: Heart is normal in size.  No pericardial effusion.  Distal esophagus is again noted to be fluid-filled and dilated, although improved when compared to 01/07/2023.  Patchy airspace consolidation at the visualized lung bases is not significantly changed from 01/07/2023.  No pleural effusion. Organs: Liver is diffusely hypoattenuating.  Gallbladder is surgically absent.  Spleen, pancreas, and adrenal glands have a grossly unremarkable unenhanced appearance. Kidneys are symmetric in size and attenuation.  No renal or ureteral calculus.  No hydronephrosis or perinephric inflammation. GI/Bowel: Stomach is moderately distended and  fluid-filled, improved when compared to 01/07/2023. No abnormal small bowel distention. There is scattered colonic diverticulosis. No focal pericolonic inflammation. No free air or ascites. Appendix is normal. Pelvis: Urinary bladder is within normal limits. Uterus appears to be surgically absent. Peritoneum/Retroperitoneum: The abdominal aorta is normal in caliber. There is no retroperitoneal or mesenteric lymphadenopathy. Bones/Soft Tissues: There is no acute or suspicious osseous abnormality. Visualized superficial soft tissues are within normal limits. 1.  Moderately dilated and fluid-filled distal esophagus and stomach, slightly improved when compared to the previous CT chest dated 01/07/2023. There is no abnormal small bowel distention or evidence of obstructing gastric mass, and findings may be related to gastroparesis. 2.  Unchanged patchy airspace consolidation at the visualized lung bases. 3.  Hepatic steatosis. CT CHEST WO CONTRAST    Result Date: 1/8/2023  EXAMINATION: CT OF THE CHEST WITHOUT CONTRAST 1/7/2023 10:41 pm TECHNIQUE: CT of the chest was performed without the administration of intravenous contrast. Multiplanar reformatted images are provided for review. Automated exposure control, iterative reconstruction, and/or weight based adjustment of the mA/kV was utilized to reduce the radiation dose to as low as reasonably achievable. COMPARISON: Portable chest obtained approximately 9 hours earlier. HISTORY: ORDERING SYSTEM PROVIDED HISTORY: MULTIFOCAL pna TECHNOLOGIST PROVIDED HISTORY: MULTIFOCAL pna Is the patient pregnant?->No Reason for Exam: MULTIFOCAL pna FINDINGS: Mediastinum: The esophagus is fluid-filled and markedly dilated measuring 5.5 cm diameter distally. At the level of the distal trachea, it measures 3.3 x 2.1 cm. No evidence of mediastinal, hilar or axillary lymphadenopathy. Aorta is normal in caliber without acute abnormality. The central airways are clear.   A calcified subcarinal lymph node is noted. Lungs/pleura: Patchy airspace opacity is present throughout the left lung and to a minimal degree on the right.  A partially calcified granuloma is present posteriorly in the right lower lobe inferiorly.  No pneumothorax or pleural effusion. Upper Abdomen: Limited imaging of the upper abdomen discloses moderately to markedly dilated stomach, which is entirely fluid-filled.  The proximal most portion of the duodenum is partially visualized and may also be dilated. Multiple calcified splenic granulomas are noted. Soft Tissues/Bones: No acute bone or soft tissue abnormality evident.     Diffuse patchy left lung pneumonia with minimal involvement on the right. The esophagus is fluid-filled and markedly dilated measuring 5.5 cm distally. Stomach has a similar appearance.  This may extend into the partially visualized proximal most duodenum.  Follow-up postcontrast CT abdomen/pelvis recommended for further evaluation. Evidence of old granulomatous disease.     US RENAL COMPLETE    Result Date: 1/8/2023  EXAM: US Retroperitoneal Complete, Renal EXAM DATE/TIME: 1/8/2023 9:04 am CLINICAL HISTORY: ORDERING SYSTEM PROVIDED  FUNMILAYO  TECHNOLOGIST PROVIDED HISTORY:  FUNMILAYO TECHNIQUE: Real-time complete ultrasound of the retroperitoneum with image documentation. COMPARISON: CT scan of the abdomen and pelvis 01/08/2023 and 01/15/2020 FINDINGS: Right kidney:  No acute findings.  No stones.  No hydronephrosis.  The right kidney measures 10.3 x 4.4 x 4.1 cm. Left kidney:  No acute findings.  No stones.  No hydronephrosis.  The left kidney measures 10.8 x 4.5 x 3.6 cm. Bladder:  1.9 x 1.8 x 1.2 cm hypoechoic area posterior to the left bladder is of indeterminate etiology but may be related to the vaginal cuff as the patient has undergone prior hysterectomy.  No abnormalities or significant changes noted on CT scans dating back to 2020.  Prevoid volume of the urinary bladder was 124 mL.  Neither  ureteral jet was noted on evaluation of the urinary bladder. No acute pathology or hydronephrosis noted. XR CHEST PORTABLE    Result Date: 1/7/2023  EXAMINATION: ONE XRAY VIEW OF THE CHEST 1/7/2023 12:57 pm COMPARISON: 09/25/2022 HISTORY: ORDERING SYSTEM PROVIDED HISTORY: OD, narcan TECHNOLOGIST PROVIDED HISTORY: OD, narcan Reason for Exam: port upright FINDINGS: Heart size normal.  Diffuse infiltrate throughout the left lung. No pneumothorax. No pleural effusion. Diffuse left lung infiltrate could represent pneumonia         ENDOSCOPY     Principal Problem (Resolved): Altered mental status  Active Problems:    Acute respiratory failure with hypoxia (HCC)    Elevated troponin    Rhabdomyolysis    FUNMILAYO (acute kidney injury) ([facility]    Hyperkalemia    Esophageal dilatation    Metabolic acidosis    Polysubstance abuse (HCC)    Arterial hypotension    Gastroesophageal reflux disease   Resolved Problems:    Opiate overdose (HCC)       GI Assessment:  39-year-old female with a PMH of anxiety, depression, drug abuse, GERD, T2DM, menorrhagia, hypertension, who presented to the hospital after being found down. Downtime unknown. Patient responded to Narcan. Work-up in the ED found drug screen was positive for cocaine, fentanyl, and cannabinoids. Additional labs consistent with rhabdomyolysis with myoglobin 6930, CK 1000 512 and 14,416. Troponin level 125. CTA chest showed patchy left lung pneumonia and fluid-filled esophagus with marked dilation prompting GI consult for esophageal dilation. Patient admitted for narcotic overdose. 1. Esophageal and gastric distention - suspect underlying gastroparesis. -Patient reports EGD 10/2022 that showed reflux.    - Patient denies any recent nausea or vomiting  2. GERD - Dunn's esophagus on prior EGD 2015  3. Suspected overdose -patient was positive for cocaine, fentanyl, and cannabinoids  4.   Abnormal LFTs -possibly secondary to rhabdo vs fatty liver vs other. Will continue to trend lab  5. Elevated troponin levels  6. FUNMILAYO        Plan of care:  Change Reglan to 10 mg IV before meals and at bedtime  Advance diet to low fiber, easy to chew foods  Continue Protonix 40 mg IV twice daily. If tolerating diet tomorrow, changed to p.o. Patient to follow-up with her established gastroenterologist Dr. Kota Joiner - at discharge  Will follow      Time spent reviewing chart, seeing patient, and discussing with attending: Around 30 minutes    This plan was formulated in collaboration with Dr. Marci Prieto  Please feel free to contact me with any questions or concerns. Thank you for allowing me to participate in the care of your patient. Ramiro Mcgowan, PHILIP - CNP on 1/9/2023 at 1:49 PM   [facility] [place] [facility]    Please note that this note was generated using a voice recognition dictation software. Although every effort was made to ensure the accuracy of this automated transcription, some errors in transcription may have occurred.

## 2023-01-09 NOTE — PROGRESS NOTES
Nephrology Progress Note      SUBJECTIVE       Pt was seen and examined. No acute issues overnite. Stable hemodynamics . Urine output 900 mL in the last 24 hours. Labs reviewed. BMP is unremarkable. Creatinine improved 0.94 from 1.72. CK and myoglobin are trending down and are currently at 6908 and 481 respectively. Hemoglobin is 11.8. Respiratory culture is positive for mixed bacterial morphotypes. Slight bump in troponin. CPK improving. OBJECTIVE      CURRENT TEMPERATURE:  Temp: 98.7 °F (37.1 °C)  MAXIMUM TEMPERATURE OVER 24HRS:  Temp (24hrs), Av.5 °F (36.9 °C), Min:98.1 °F (36.7 °C), Max:98.7 °F (37.1 °C)    CURRENT RESPIRATORY RATE:  Resp: 18  CURRENT PULSE:  Heart Rate: 100  CURRENT BLOOD PRESSURE:  BP: 126/77  24HR BLOOD PRESSURE RANGE:  Systolic (58PAG), YLB:805 , Min:95 , URK:100   ; Diastolic (34OJQ), NSV:34, Min:59, Max:90    24HR INTAKE/OUTPUT:    Intake/Output Summary (Last 24 hours) at 2023 2673  Last data filed at 2023 0423  Gross per 24 hour   Intake 144.52 ml   Output 750 ml   Net -605.48 ml     WEIGHT :Patient Vitals for the past 96 hrs (Last 3 readings):   Weight   23 0552 166 lb 0.1 oz (75.3 kg)   23 1049 164 lb (74.4 kg)   23 1407 167 lb (75.8 kg)     PHYSICAL EXAM      GENERAL APPEARANCE:Awake, alert, in no acute distress  SKIN: warm and dry, no rash or erythema  EYES: conjunctivae normal and sclera anicteric  ENT: no thrush no pharyngeal congestion   NECK:   No JVD. No carotid bruits and no carotid lymphadenopathy . PULMONARY: lungs are clear to auscultation. No Wheezing, no ronchi . CADRDIOVASCULAR: S1 and S2 normal NO S3 and NO S4 . No rubs , no murmur. ABDOMEN: soft nontender, bowel sounds present, no organomegaly, no ascites.        EXTREMITIES: no cyanosis, clubbing or edema     CURRENT MEDICATIONS      piperacillin-tazobactam (ZOSYN) 3,375 mg in dextrose 5 % 50 mL IVPB extended infusion (mini-bag), Q8H  pantoprazole (PROTONIX) 40 mg in sodium chloride (PF) 0.9 % 10 mL injection, Q12H  sodium bicarbonate 75 mEq in sodium chloride 0.45 % 1,075 mL infusion, Continuous  metoclopramide (REGLAN) injection 10 mg, Q6H  0.9 % sodium chloride bolus, Once  sodium chloride flush 0.9 % injection 5-40 mL, 2 times per day  sodium chloride flush 0.9 % injection 5-40 mL, PRN  0.9 % sodium chloride infusion, PRN  ondansetron (ZOFRAN-ODT) disintegrating tablet 4 mg, Q8H PRN   Or  ondansetron (ZOFRAN) injection 4 mg, Q6H PRN  polyethylene glycol (GLYCOLAX) packet 17 g, Daily PRN  acetaminophen (TYLENOL) tablet 650 mg, Q6H PRN   Or  acetaminophen (TYLENOL) suppository 650 mg, Q6H PRN  [Held by provider] heparin (porcine) injection 5,000 Units, 3 times per day  insulin regular (HUMULIN R;NOVOLIN R) injection 10 Units, Once   And  dextrose bolus 10% 250 mL, Once  glucose chewable tablet 16 g, PRN  dextrose bolus 10% 125 mL, PRN   Or  dextrose bolus 10% 250 mL, PRN  glucagon (rDNA) injection 1 mg, PRN  dextrose 10 % infusion, Continuous PRN          LABS      CBC:   Recent Labs     01/07/23  1245 01/08/23  1033 01/09/23  0031   WBC 26.7* 18.8* 10.6   RBC 5.82* 4.30 3.98   HGB 17.2* 12.8 11.8*   HCT 56.5* 40.1 37.2   MCV 97.1 93.3 93.5   MCH 29.6 29.8 29.6   MCHC 30.4 31.9 31.7   RDW 13.9 14.4 14.4    169 132*   MPV 10.5 11.1 10.8      BMP:   Recent Labs     01/07/23  2314 01/08/23  0119 01/08/23  1033 01/09/23  0031     --  137 135   K 5.4* 5.1 4.8 3.9     --  108* 107   CO2 14*  --  15* 19*   BUN 49*  --  58* 35*   CREATININE 1.95*  --  1.72* 0.94*   GLUCOSE 112*  --  137* 96   CALCIUM 8.0*  --  8.1* 8.2*      BNP:No results found for: BNP  PHOSPHORUS:  No results for input(s): PHOS in the last 72 hours.  MAGNESIUM:   Recent Labs     01/07/23  1245   MG 2.6     ALBUMIN:   Recent Labs     01/07/23  1245 01/09/23  0031   LABALBU 4.4 2.8*     IRON:    Lab Results   Component Value Date/Time    IRON 54 02/11/2021 12:00 AM     IRON SATURATION:  No results found  for: LABIRON  TIBC:    Lab Results   Component Value Date/Time    TIBC 361 02/11/2021 12:00 AM     FERRITIN:  No results found for: FERRITIN  ADRIEL:   Lab Results   Component Value Date    ADRIEL NEGATIVE 05/19/2016       SPEP:   Lab Results   Component Value Date/Time    PROT 5.4 01/09/2023 12:31 AM    ALBCAL PENDING 01/08/2023 07:22 PM    ALBPCT PENDING 01/08/2023 07:22 PM    LABALPH PENDING 01/08/2023 07:22 PM    LABALPH PENDING 01/08/2023 07:22 PM    A1PCT PENDING 01/08/2023 07:22 PM    A2PCT PENDING 01/08/2023 07:22 PM    Velinda Deutscher PENDING 01/08/2023 07:22 PM    BETAPCT PENDING 01/08/2023 07:22 PM    Elaine Look PENDING 01/08/2023 07:22 PM    GGPCT PENDING 01/08/2023 07:22 PM    PATH PENDING 01/08/2023 07:22 PM     UPEP:   Lab Results   Component Value Date/Time    TPU 31 01/08/2023 06:07 AM      HEPBSAG:  Lab Results   Component Value Date/Time    HEPBSAG NONREACTIVE 01/08/2023 07:22 PM     HEPCAB:  Lab Results   Component Value Date/Time    HEPCAB NONREACTIVE 01/08/2023 07:22 PM     C3:   Lab Results   Component Value Date    C3 90 01/08/2023     C4:   Lab Results   Component Value Date    C4 25 01/08/2023     MPO ANCA: No results found for: MPO .   PR3 ANCA:  No results found for: PR3  URINE SODIUM:    Lab Results   Component Value Date/Time    RUDY 22 01/08/2023 06:07 AM      URINE CREATININE:    Lab Results   Component Value Date/Time    LABCREA 83.0 01/08/2023 07:15 PM     URINE EOSINOPHILS: No results found for: UREO  URINE PROTEIN:    Lab Results   Component Value Date/Time    TPU 31 01/08/2023 06:07 AM     URINALYSIS:  U/A:   Lab Results   Component Value Date/Time    NITRU NEGATIVE 07/05/2022 12:24 AM    COLORU Yellow 07/05/2022 12:24 AM    PHUR 5.5 07/05/2022 12:24 AM    WBCUA 3 to 5 07/05/2022 12:24 AM    RBCUA 0 TO 2 07/05/2022 12:24 AM    MUCUS 3+ 03/14/2015 03:22 PM    TRICHOMONAS NOT REPORTED 03/14/2015 03:22 PM    YEAST NOT REPORTED 03/14/2015 03:22 PM    BACTERIA FEW 07/05/2022 12:24 AM    CLARITYU Clear 02/09/2021 10:21 AM    SPECGRAV 1.020 07/05/2022 12:24 AM    LEUKOCYTESUR NEGATIVE 07/05/2022 12:24 AM    UROBILINOGEN Normal 07/05/2022 12:24 AM    BILIRUBINUR NEGATIVE 07/05/2022 12:24 AM    BILIRUBINUR Negative 02/09/2021 10:21 AM    BILIRUBINUR NEGATIVE 09/22/2011 05:13 PM    BLOODU Negative 02/09/2021 10:21 AM    GLUCOSEU NEGATIVE 07/05/2022 12:24 AM    GLUCOSEU NEGATIVE 09/22/2011 05:13 PM    KETUA NEGATIVE 07/05/2022 12:24 AM    AMORPHOUS NOT REPORTED 03/14/2015 03:22 PM     ANTIGBM:No results found for: GBMABIGG      RADIOLOGY      CT ABDOMEN PELVIS WO CONTRAST Additional Contrast? None    Result Date: 1/8/2023  1. Moderately dilated and fluid-filled distal esophagus and stomach, slightly improved when compared to the previous CT chest dated 01/07/2023. There is no abnormal small bowel distention or evidence of obstructing gastric mass, and findings may be related to gastroparesis. 2.  Unchanged patchy airspace consolidation at the visualized lung bases. 3.  Hepatic steatosis. CT CHEST WO CONTRAST    Result Date: 1/8/2023  Diffuse patchy left lung pneumonia with minimal involvement on the right. The esophagus is fluid-filled and markedly dilated measuring 5.5 cm distally. Stomach has a similar appearance. This may extend into the partially visualized proximal most duodenum. Follow-up postcontrast CT abdomen/pelvis recommended for further evaluation. Evidence of old granulomatous disease. US RENAL COMPLETE    Result Date: 1/8/2023  No acute pathology or hydronephrosis noted. XR CHEST PORTABLE    Result Date: 1/7/2023  Diffuse left lung infiltrate could represent pneumonia         ASSESSMENT      1. Acute Kidney Injury: Nonoliguric likely secondary to pigment nephropathy versus ischemic ATN. More likely to have ischemic ATN than pigment nephropathy the fact that renal functions recovered so quickly. Hypotension and NSAIDs and polysubstance usage at home, likely contributing factors.   Baseline creatinine seems to be 0.9 peaked up to 1.7 now down to 0.9  2. Nontraumatic Rhabdomyolysis -elevated lab work CK levels myoglobin. 3.  Altered mental status  4. Metabolic acidosis. 5.  Polysubstance usage, urine tox positive for cannabinoids, cocaine, fentanyl. 6.  Diffuse left lung infiltrate-pneumonia. 7.  Acute respiratory failure with hypoxia  8. Hypotension. 9.  Hyperkalemia likely due to underlying renal dysfunction and metabolic acidosis, now improved. 10.  Elevated troponin. 11. GERD  12.  Lactic acidosis. PLAN      1. Discontinue IV fluids  2. Strict I's and O's and daily weights recorded in chart  3. Avoid IV contrast and nephrotoxic agents if at all possible  4. Renal Ultrasound 1/8/22 - No acute pathology or hydronephrosis noted. 5.  Comprehensive urine testing including Urinalysis shows moderate leukocyte esterase, 10 to 20 WBC, 1+ protein and moderate Hb. Urine sodium - 22, potassium, chloride -54, Urine protein and creatinine to quantify the proteinuria if any at all. Will check urinary eosinophils as well. 6.  Will check serum and urine protein electrophoresis to r/o element to occult paraprotein disease. 7.   Hepatitis B and C negative, ADRIEL - pending, Complement levels. C3 - 90 and C4 - 25.  8.  Add oral bicarbonate, which can be discontinued on discharge  9. Nephrology signing off please call for questions  10. No need for nephrology follow-up    Please do not hesitate to call with questions. This note is created with the assistance of a speech-recognition program. While intending to generate a document that actually reflects the content of the visit, no guarantees can be provided that every mistake has been identified and corrected by editing    Electronically signed by Pau Mari MD on 1/9/2023 at 8:32 AM    Attending Physician Statement  I have discussed the care of Marissa Bolden, including pertinent history and exam findings with the resident/fellow. I have reviewed the key elements of all parts of the encounter with the resident/fellow. I have seen and examined the patient with the resident/fellow. I agree with the assessment and plan and status of the problem list as documented.       .  Electronically signed by Bonnie Pollock MD on 1/9/2023 at 2:31 PM

## 2023-01-09 NOTE — CARE COORDINATION
Received social work consult regarding drug overdose. Met with pt to complete assessment and offer resources. Pt very tearful during conversation. Pt states she does not recall any events that led up to her admission. Pt claims she was cooking dinner and that her boyfriend of 8 years [place] left to go  biscuits. Pt admits to daily marijuana use but denies all other drug use. Writer discussed drugs in system with pt and pt began to cry harder stating that she has no idea how drugs were in her system and that she has never used cocaine. Pt also denies that boyfriend uses. Pt denies need for treatment, and adamantly denies drug use other than marijuana. Pt also denies alcohol use and depression/SI. [facility] mental health diagnosis of social anxiety and panic disorder. Pt claims she only uses marijuana to deal with her anxiety. Pt has been linked with [facility] and [facility] in the past.  Pt claims she has a new pt appointment at [facility]. [facility] and confirmed appointment. Pt has phone assessment on January 11th at [address] and in person appointment on January 12th with Leonidas Meigs at [facility] on [address]. Psych consult has been ordered. Will follow.

## 2023-01-09 NOTE — PLAN OF CARE
Problem: Discharge Planning  Goal: Discharge to home or other facility with appropriate resources  1/9/2023 1342 by Teresita García RN  Outcome: Progressing  1/9/2023 0451 by Maira Coronel RN  Outcome: Progressing     Problem: Safety - Adult  Goal: Free from fall injury  1/9/2023 1342 by Teresita García RN  Outcome: Progressing  1/9/2023 0451 by Maira Coronel RN  Outcome: Progressing     Problem: ABCDS Injury Assessment  Goal: Absence of physical injury  1/9/2023 1342 by Teresita García RN  Outcome: Progressing  1/9/2023 0451 by Maira Coronel RN  Outcome: Progressing

## 2023-01-10 VITALS
TEMPERATURE: 98.9 F | WEIGHT: 166.01 LBS | SYSTOLIC BLOOD PRESSURE: 137 MMHG | HEART RATE: 61 BPM | DIASTOLIC BLOOD PRESSURE: 97 MMHG | HEIGHT: 62 IN | BODY MASS INDEX: 30.55 KG/M2 | RESPIRATION RATE: 24 BRPM | OXYGEN SATURATION: 92 %

## 2023-01-10 PROBLEM — R41.0 DISORIENTATION: Status: ACTIVE | Noted: 2023-01-10

## 2023-01-10 LAB
ABSOLUTE EOS #: 0.04 K/UL (ref 0–0.44)
ABSOLUTE IMMATURE GRANULOCYTE: 0.07 K/UL (ref 0–0.3)
ABSOLUTE LYMPH #: 0.98 K/UL (ref 1.1–3.7)
ABSOLUTE MONO #: 0.76 K/UL (ref 0.1–1.2)
ALBUMIN SERPL-MCNC: 2.9 G/DL (ref 3.5–5.2)
ALBUMIN/GLOBULIN RATIO: 1.1 (ref 1–2.5)
ALP BLD-CCNC: 71 U/L (ref 35–104)
ALT SERPL-CCNC: 102 U/L (ref 5–33)
ANION GAP SERPL CALCULATED.3IONS-SCNC: 8 MMOL/L (ref 9–17)
AST SERPL-CCNC: 115 U/L
BASOPHILS # BLD: 0 % (ref 0–2)
BASOPHILS ABSOLUTE: <0.03 K/UL (ref 0–0.2)
BILIRUB SERPL-MCNC: 0.8 MG/DL (ref 0.3–1.2)
BILIRUBIN DIRECT: 0.3 MG/DL
BILIRUBIN, INDIRECT: 0.5 MG/DL (ref 0–1)
BUN BLDV-MCNC: 13 MG/DL (ref 6–20)
CALCIUM SERPL-MCNC: 8.4 MG/DL (ref 8.6–10.4)
CHLORIDE BLD-SCNC: 102 MMOL/L (ref 98–107)
CO2: 26 MMOL/L (ref 20–31)
CREAT SERPL-MCNC: 0.58 MG/DL (ref 0.5–0.9)
EOSINOPHILS RELATIVE PERCENT: 1 % (ref 1–4)
GFR SERPL CREATININE-BSD FRML MDRD: >60 ML/MIN/1.73M2
GLUCOSE BLD-MCNC: 104 MG/DL (ref 65–105)
GLUCOSE BLD-MCNC: 118 MG/DL (ref 65–105)
GLUCOSE BLD-MCNC: 98 MG/DL (ref 70–99)
HCT VFR BLD CALC: 34.9 % (ref 36.3–47.1)
HEMOGLOBIN: 11.6 G/DL (ref 11.9–15.1)
IMMATURE GRANULOCYTES: 1 %
LYMPHOCYTES # BLD: 11 % (ref 24–43)
MAGNESIUM: 1.8 MG/DL (ref 1.6–2.6)
MCH RBC QN AUTO: 29.4 PG (ref 25.2–33.5)
MCHC RBC AUTO-ENTMCNC: 33.2 G/DL (ref 28.4–34.8)
MCV RBC AUTO: 88.6 FL (ref 82.6–102.9)
MONOCYTES # BLD: 9 % (ref 3–12)
NRBC AUTOMATED: 0 PER 100 WBC
P E INTERPRETATION, U: NORMAL
PATHOLOGIST: NORMAL
PDW BLD-RTO: 13.8 % (ref 11.8–14.4)
PLATELET # BLD: ABNORMAL K/UL (ref 138–453)
PLATELET, FLUORESCENCE: 138 K/UL (ref 138–453)
PLATELET, IMMATURE FRACTION: 4.3 % (ref 1.1–10.3)
POTASSIUM SERPL-SCNC: 3.5 MMOL/L (ref 3.7–5.3)
RBC # BLD: 3.94 M/UL (ref 3.95–5.11)
SEG NEUTROPHILS: 79 % (ref 36–65)
SEGMENTED NEUTROPHILS ABSOLUTE COUNT: 6.93 K/UL (ref 1.5–8.1)
SODIUM BLD-SCNC: 136 MMOL/L (ref 135–144)
SPECIMEN TYPE: NORMAL
TOTAL PROTEIN: 5.5 G/DL (ref 6.4–8.3)
URINE TOTAL PROTEIN: 31 MG/DL
WBC # BLD: 8.8 K/UL (ref 3.5–11.3)

## 2023-01-10 PROCEDURE — 6370000000 HC RX 637 (ALT 250 FOR IP): Performed by: INTERNAL MEDICINE

## 2023-01-10 PROCEDURE — 80048 BASIC METABOLIC PNL TOTAL CA: CPT

## 2023-01-10 PROCEDURE — 2580000003 HC RX 258: Performed by: STUDENT IN AN ORGANIZED HEALTH CARE EDUCATION/TRAINING PROGRAM

## 2023-01-10 PROCEDURE — 99239 HOSP IP/OBS DSCHRG MGMT >30: CPT | Performed by: STUDENT IN AN ORGANIZED HEALTH CARE EDUCATION/TRAINING PROGRAM

## 2023-01-10 PROCEDURE — 80076 HEPATIC FUNCTION PANEL: CPT

## 2023-01-10 PROCEDURE — 6370000000 HC RX 637 (ALT 250 FOR IP)

## 2023-01-10 PROCEDURE — 6370000000 HC RX 637 (ALT 250 FOR IP): Performed by: STUDENT IN AN ORGANIZED HEALTH CARE EDUCATION/TRAINING PROGRAM

## 2023-01-10 PROCEDURE — 6360000002 HC RX W HCPCS: Performed by: STUDENT IN AN ORGANIZED HEALTH CARE EDUCATION/TRAINING PROGRAM

## 2023-01-10 PROCEDURE — 85025 COMPLETE CBC W/AUTO DIFF WBC: CPT

## 2023-01-10 PROCEDURE — 6360000002 HC RX W HCPCS

## 2023-01-10 PROCEDURE — 85055 RETICULATED PLATELET ASSAY: CPT

## 2023-01-10 PROCEDURE — 36415 COLL VENOUS BLD VENIPUNCTURE: CPT

## 2023-01-10 PROCEDURE — APPSS30 APP SPLIT SHARED TIME 16-30 MINUTES: Performed by: INTERNAL MEDICINE

## 2023-01-10 PROCEDURE — 6360000002 HC RX W HCPCS: Performed by: INTERNAL MEDICINE

## 2023-01-10 PROCEDURE — 82947 ASSAY GLUCOSE BLOOD QUANT: CPT

## 2023-01-10 PROCEDURE — 83735 ASSAY OF MAGNESIUM: CPT

## 2023-01-10 PROCEDURE — 2580000003 HC RX 258

## 2023-01-10 RX ORDER — SODIUM BICARBONATE 650 MG/1
650 TABLET ORAL 2 TIMES DAILY
Qty: 60 TABLET | Refills: 11 | Status: ON HOLD | OUTPATIENT
Start: 2023-01-10 | End: 2023-01-14 | Stop reason: ALTCHOICE

## 2023-01-10 RX ORDER — METOCLOPRAMIDE 5 MG/1
5 TABLET ORAL
Status: DISCONTINUED | OUTPATIENT
Start: 2023-01-10 | End: 2023-01-10 | Stop reason: HOSPADM

## 2023-01-10 RX ORDER — AMOXICILLIN AND CLAVULANATE POTASSIUM 875; 125 MG/1; MG/1
1 TABLET, FILM COATED ORAL EVERY 12 HOURS SCHEDULED
Status: DISCONTINUED | OUTPATIENT
Start: 2023-01-10 | End: 2023-01-10 | Stop reason: HOSPADM

## 2023-01-10 RX ORDER — PANTOPRAZOLE SODIUM 40 MG/1
40 TABLET, DELAYED RELEASE ORAL
Status: DISCONTINUED | OUTPATIENT
Start: 2023-01-11 | End: 2023-01-10 | Stop reason: HOSPADM

## 2023-01-10 RX ORDER — METOCLOPRAMIDE 5 MG/1
5 TABLET ORAL
Qty: 84 TABLET | Refills: 0 | Status: ON HOLD | OUTPATIENT
Start: 2023-01-10 | End: 2023-01-15 | Stop reason: HOSPADM

## 2023-01-10 RX ORDER — AMOXICILLIN AND CLAVULANATE POTASSIUM 875; 125 MG/1; MG/1
1 TABLET, FILM COATED ORAL EVERY 12 HOURS SCHEDULED
Qty: 6 TABLET | Refills: 0 | Status: ON HOLD | OUTPATIENT
Start: 2023-01-10 | End: 2023-01-14

## 2023-01-10 RX ORDER — FLUCONAZOLE 100 MG/1
100 TABLET ORAL DAILY
Qty: 7 TABLET | Refills: 0 | Status: ON HOLD | OUTPATIENT
Start: 2023-01-10 | End: 2023-01-14

## 2023-01-10 RX ORDER — PANTOPRAZOLE SODIUM 40 MG/1
40 TABLET, DELAYED RELEASE ORAL 2 TIMES DAILY
Status: DISCONTINUED | OUTPATIENT
Start: 2023-01-10 | End: 2023-01-10

## 2023-01-10 RX ADMIN — AMOXICILLIN AND CLAVULANATE POTASSIUM 1 TABLET: 875; 125 TABLET, FILM COATED ORAL at 11:47

## 2023-01-10 RX ADMIN — NYSTATIN 500000 UNITS: 100000 SUSPENSION ORAL at 11:47

## 2023-01-10 RX ADMIN — METOCLOPRAMIDE 5 MG: 5 TABLET ORAL at 11:47

## 2023-01-10 RX ADMIN — PIPERACILLIN AND TAZOBACTAM 3375 MG: 3; .375 INJECTION, POWDER, FOR SOLUTION INTRAVENOUS at 01:27

## 2023-01-10 RX ADMIN — SODIUM CHLORIDE, PRESERVATIVE FREE 10 ML: 5 INJECTION INTRAVENOUS at 09:02

## 2023-01-10 RX ADMIN — PANTOPRAZOLE SODIUM 40 MG: 40 TABLET, DELAYED RELEASE ORAL at 09:02

## 2023-01-10 RX ADMIN — PIPERACILLIN AND TAZOBACTAM 3375 MG: 3; .375 INJECTION, POWDER, FOR SOLUTION INTRAVENOUS at 09:05

## 2023-01-10 RX ADMIN — SODIUM CHLORIDE: 9 INJECTION, SOLUTION INTRAVENOUS at 09:03

## 2023-01-10 RX ADMIN — METOCLOPRAMIDE 10 MG: 5 INJECTION, SOLUTION INTRAMUSCULAR; INTRAVENOUS at 06:20

## 2023-01-10 RX ADMIN — SODIUM BICARBONATE 650 MG: 648 TABLET ORAL at 09:02

## 2023-01-10 RX ADMIN — HEPARIN SODIUM 5000 UNITS: 5000 INJECTION INTRAVENOUS; SUBCUTANEOUS at 06:20

## 2023-01-10 ASSESSMENT — ENCOUNTER SYMPTOMS
CHOKING: 0
VOMITING: 0
ABDOMINAL PAIN: 0
NAUSEA: 0
SHORTNESS OF BREATH: 0
COUGH: 0
DIARRHEA: 0
CHEST TIGHTNESS: 0
CONSTIPATION: 0

## 2023-01-10 NOTE — PLAN OF CARE
Summary   Left ventricle is normal in size. Global left ventricular systolic function   is normal.   Calculated EF via 3D Heart Model is 60 %. Mild tricuspid regurgitation. Mild pulmonary hypertension with an estimated right ventricular systolic   pressure of 45 mmHg. IVC Increased diameter, but still has inspiratory variation suggesting upper   normal or mildly elevated RA filling pressure (i.e. CVP) .      Signature   ----------------------------------------------------------------------------    Electronically signed by Светлана Thomas(Sonographer) on 01/09/2023    04:37 PM   ----------------------------------------------------------------------------     ----------------------------------------------------------------------------    Electronically signed by Herbert Alexander(Interpreting physician) on 01/09/2023    08:17 PM   ----------------------------------------------------------------------------          ECHO reviewed as above -Preserved LV Function - no further cardiac workup needed , cardiology is signing off , please call us back if needed       Electronically signed by PHILIP Mayorga NP on 1/9/23

## 2023-01-10 NOTE — PLAN OF CARE
Problem: Discharge Planning  Goal: Discharge to home or other facility with appropriate resources  1/9/2023 2258 by Julia Ballard RN  Outcome: Progressing  1/9/2023 1342 by Bc Gonzáles RN  Outcome: Progressing     Problem: Safety - Adult  Goal: Free from fall injury  1/9/2023 2258 by Julia Ballard RN  Outcome: Progressing  1/9/2023 1342 by Bc Gonzáles RN  Outcome: Progressing     Problem: ABCDS Injury Assessment  Goal: Absence of physical injury  1/9/2023 2258 by Julia Ballard RN  Outcome: Progressing  1/9/2023 1342 by Bc Gonzáles RN  Outcome: Progressing     Problem: Pain  Goal: Verbalizes/displays adequate comfort level or baseline comfort level  1/9/2023 2258 by Julia Ballard RN  Outcome: Progressing  1/9/2023 1342 by Bc Gonzáles RN  Outcome: Progressing

## 2023-01-10 NOTE — DISCHARGE SUMMARY
[facility]     Department of Internal Medicine - Staff Internal Medicine Teaching Service    INPATIENT DISCHARGE SUMMARY      Patient Identification:  Killian Gaitan is a 38 y.o. female. :  1980  MRN: [id number]     Acct: [de-identified]   PCP: Sarina Apgar, DO  Admit Date:  2023  Discharge date and time: 1/10/2023  1:45 PM   Attending Provider: No att. providers found                                     [address] Problem Lists:  Principal Problem (Resolved): Altered mental status  Active Problems:    Opiate overdose (HCC)    Acute respiratory failure with hypoxia (HCC)    Elevated troponin    Rhabdomyolysis    FUNMILAYO (acute kidney injury) ([address]    Hyperkalemia    Esophageal dilatation    Metabolic acidosis    Polysubstance abuse ([address]    Arterial hypotension    Gastroesophageal reflux disease       HOSPITAL STAY     Brief Inpatient course:   Patient, 38years old female, presented to the hospital after being found down. According to the charts, patient was found down, Narcan was given 2 Mg IV followed by 2 Mg IM with improvement in mentation. At initial presentation patient was not maintaining her oxygen saturation on room air, 79% after which she was put on 4 L nasal cannula and her oxygen saturation improved. In ED, WBC count 26.7, hemoglobin 17.2, magnesium 2.6, troponin level initially 125, followed by 110. CK1 512, myoglobin 6930. Blood culture drawn twice negative. Lactate 4.8 followed by 4.2, urine drug screen positive for cocaine, fentanyl and cannabinoid. Chest x-ray showed diffuse left lung infiltrate most likely pneumonia.  : CT chest shows left sided pneumonea with dilated esophagus, abdominal CT and GI consult, BMP shows worsening FUNMILAYO     : Cardiology recommends echo to assess LVEF. Nephrology recommends to continue bicarb drip. CT abdomen findings suggestive of gastroparesis.   GI recommends to start patient on Reglan and trial of clear liquid diet. CK , myoglobin, creatinine and urea trending downward. 01/10 : Patient maintaing oxygen saturation at room air, patient tolerating her diet well, patient oriented x4. Patient hemodynamically and medically stable for being discharged     Procedures/ Significant Interventions:    N/A    Consults:     Consults:     Final Specialist Recommendations/Findings:   IP CONSULT TO INTERNAL MEDICINE  IP CONSULT TO CASE MANAGEMENT  IP CONSULT TO SOCIAL WORK  IP CONSULT TO CARDIOLOGY  IP CONSULT TO NEPHROLOGY  IP CONSULT TO GI      Any Hospital Acquired Infections: none    Discharge Functional Status:  stable    DISCHARGE PLAN     Disposition: home    Patient Instructions:   Discharge Medication List as of 1/10/2023  1:07 PM        START taking these medications    Details   sodium bicarbonate 650 MG tablet Take 1 tablet by mouth 2 times daily, Disp-60 tablet, R-11Normal      amoxicillin-clavulanate (AUGMENTIN) 875-125 MG per tablet Take 1 tablet by mouth every 12 hours for 6 doses, Disp-6 tablet, R-0Normal      metoclopramide (REGLAN) 5 MG tablet Take 1 tablet by mouth 4 times daily (before meals and nightly) for 21 days Take for 3 weeks then take break  for 1 week, Disp-84 tablet, R-0Normal      fluconazole (DIFLUCAN) 100 MG tablet Take 1 tablet by mouth daily for 7 days, Disp-7 tablet, R-0Normal           CONTINUE these medications which have NOT CHANGED    Details   VENTOLIN  (90 Base) MCG/ACT inhaler INHALE 2 PUFFS INTO THE LUNGS EVERY 4 HOURS AS NEEDED FOR WHEEZING, Disp-18 g, R-3Normal      Benzocaine-Menthol (SORE THROAT LOZENGES) 6-10 MG LOZG lozenge Take 1 lozenge by mouth every 2 hours as needed for Sore Throat, Disp-36 lozenge, R-0Print      fluticasone (FLONASE) 50 MCG/ACT nasal spray 1 spray by Each Nostril route daily, Disp-16 g, R-0Print      pantoprazole (PROTONIX) 40 MG tablet Take 1 tablet by mouth daily (with breakfast), Disp-30 tablet, R-3RXRD. Refill Due on 05/19/22. Queued by [employer] . Normal      albuterol (PROVENTIL) (2.5 MG/3ML) 0.083% nebulizer solution TAKE 3 MLS BY NEBULIZATION EVERY 6 HOURS AS NEEDED FOR WHEEZING, Disp-360 mL, R-3Normal      paliperidone (INVEGA) 6 MG extended release tablet Take 1 tablet by mouth daily, Disp-30 tablet, R-3Normal      budesonide-formoterol (SYMBICORT) 80-4.5 MCG/ACT AERO Inhale 2 puffs into the lungs 2 times daily, Disp-10.2 g, R-3Normal           STOP taking these medications       ibuprofen (ADVIL;MOTRIN) 800 MG tablet Comments:   Reason for Stopping:               Activity: activity as tolerated    Diet: regular diet    Follow-up:    [facility]  [address] [place] [id number]    Call on 1/11/2023  phone assessment at [facility]  SUZANNE/Kei Higgins  [address] [facility]    Follow up  Appointment on 1/12/2023 9am at [address], [address]    Follow up  [facility] follow up    MD Griselda Fuentes. [address] [facility]  [phone]    Follow up in 1 week(s)  for esophageal dilation , possible  EGD , patient has been started on Reglan in hospital      Patient Instructions:   Please continue taking Protonix daily and reglan before meals and nightly, take for 3 weeks, take break for 1 week in between and start taking again, follow up with Dr. Klaudia Antoine , your GI doctor for the same. May need to get EGD to evaluate for esophageal dilation. We have started you on bicarbonate tablets, kidney function is improving, please make sure you get another lab test in a week to make sure it is good, follow up with PCP for same.     Complete course of antibiotics Augmentin and Diflucan for pneumonia for which you were being treated in hospital.    Follow up labs: BMP  Follow up imaging: N/A    Note that over 30 minutes was spent in preparing discharge papers, discussing discharge with patient, medication review, etc.      Oneil Martin MD  Internal Medicine Resident, PGY-1  [facility],  [place], [place].  1/10/2023, 3:43 PM

## 2023-01-10 NOTE — CARE COORDINATION
Discharge [address] [place], [place] Case Management Department  Written by: Adalid Thompson RN    Patient Name: Deric Colmenares  Attending Provider: Oswaldo Pedro MD  Admit Date: 2023 12:27 PM  MRN: [id number]  Account: [de-identified]                     : 1980  Discharge Date: 1/10/23      Disposition: home    Met with patient to discuss transitional planning. Plan is home independently with family support. Patient has outpatient appointment already arranged with [place] that was set up by social work. Patient's mother to provide ride home.  Patient agreeable to plan     Adalid Thompson RN

## 2023-01-10 NOTE — PROGRESS NOTES
CLINICAL PHARMACY NOTE: MEDS TO BEDS    Total # of Prescriptions Filled: 4   The following medications were delivered to the patient:  Sodium bicarb. 650 mg tab  Amocicillin- pot clav 875 -125 mg tab  Fluconazole 100 mg tab  Metoclopramide 5 mgtab    Additional Documentation: E.L del.

## 2023-01-10 NOTE — PROGRESS NOTES
[facility]  Internal Medicine Teaching Residency Program  Inpatient Daily Progress Note  ______________________________________________________________________________    Patient: Courtney Allen  YOB: 1980   MRN:[id number]    Acct: [id number]     Room: 0425/0425-01  Admit date: 1/7/2023  Today's date: 01/10/23  Number of days in the hospital: 3    SUBJECTIVE   Admitting Diagnosis: Altered mental status  CC: Altered mental status    No acute events happened overnight.  Patient examined at bedside today.  Labs and chart reviewed.  Patient denies any symptom.  Patient denies any shortness of breath, chest pain or belly pain.  Patient denies any depressive symptom, intention to harm herself or others, patient also denies any suicidal ideation.  Upon asking, patient states that she had no intention to harm or suicide herself when she had drug overdose.  Blood pressure 130/86, heart rate 63/min, oxygen saturation of greater than 95% on RA.  Patient remained afebrile    Review of Systems   Constitutional:  Negative for appetite change, fatigue and fever.   Respiratory:  Negative for cough, choking, chest tightness and shortness of breath.    Cardiovascular:  Negative for chest pain.   Gastrointestinal:  Negative for abdominal pain, constipation, diarrhea, nausea and vomiting.   Neurological:  Negative for weakness, light-headedness and headaches.       BRIEF HISTORY     Patient, 42 years old female, presented to the hospital after being found down.  Patient is unable to provide her history, stating that she does not remember what happened, she states that she can only remember that she went to her bed last night, followed by waking up in the hospital.     According to the charts, patient was found down, Narcan was given 2 Mg IV followed by 2 Mg IM with improvement in mentation.  At initial presentation patient was not maintaining her oxygen saturation on room  air, 79% after which she was put on 4 L nasal cannula and her oxygen saturation improved. In ED, WBC count 26.7, hemoglobin 17.2, magnesium 2.6, troponin level initially 125, followed by 110. CK1 512, myoglobin 6930. Blood culture drawn twice negative. Lactate 4.8 followed by 4.2, urine drug screen positive for cocaine, fentanyl and cannabinoid. Chest x-ray showed diffuse left lung infiltrate most likely pneumonia.  : CT chest shows left sided pneumonea with dilated esophagus, will get abdominal CT and GI consult, BMP shows worsening FUNMILAYO    : Cardiology recommends echo to assess LVEF. Nephrology recommends to continue bicarb drip. CT abdomen findings suggestive of gastroparesis. GI recommends to start patient on Reglan and trial of clear liquid diet. Patient may be considered for EGD, pending patient's symptom cardiac work-up for elevated troponin. Protonix 40 Mg IV twice daily. HbA1c 6.2. CK trending upward and myoglobin, creatinine and urea trending downward. OBJECTIVE     Vital Signs:  BP (!) 137/97   Pulse 61   Temp 98.9 °F (37.2 °C) (Temporal)   Resp 24   Ht 5' 2\" (1.575 m)   Wt 166 lb 0.1 oz (75.3 kg)   LMP 2013   SpO2 92%   BMI 30.36 kg/m²     Temp (24hrs), Av.6 °F (37 °C), Min:98.2 °F (36.8 °C), Max:98.9 °F (37.2 °C)    In: 711.4   Out: 950 [Urine:950]    Physical Exam  Vitals and nursing note reviewed. Constitutional:       General: She is awake. Interventions: Nasal cannula in place. Cardiovascular:      Heart sounds: Normal heart sounds. Pulmonary:      Effort: Pulmonary effort is normal.      Breath sounds: Normal breath sounds. Chest:      Chest wall: No tenderness. Abdominal:      Palpations: Abdomen is soft. Tenderness: There is no abdominal tenderness. Musculoskeletal:      Right lower leg: No edema. Left lower leg: No edema. Neurological:      Mental Status: She is alert and oriented to person, place, and time.   Psychiatric:         Mood and Affect: Mood normal. Mood is not depressed.         Behavior: Behavior is cooperative.         Thought Content: Thought content does not include homicidal or suicidal ideation.     Medications:  Scheduled Medications:    amoxicillin-clavulanate  1 tablet Oral 2 times per day    nystatin  5 mL Oral 4x Daily    metoclopramide  5 mg Oral 4x Daily AC & HS    [START ON 1/11/2023] pantoprazole  40 mg Oral QAM AC    sodium bicarbonate  650 mg Oral BID    sodium chloride  20 mL/kg IntraVENous Once    sodium chloride flush  5-40 mL IntraVENous 2 times per day    heparin (porcine)  5,000 Units SubCUTAneous 3 times per day    insulin regular  10 Units IntraVENous Once    And    dextrose bolus  250 mL IntraVENous Once     Continuous Infusions:    sodium chloride Stopped (01/10/23 0904)    dextrose       PRN Medicationssodium chloride flush, 5-40 mL, PRN  sodium chloride, , PRN  ondansetron, 4 mg, Q8H PRN   Or  ondansetron, 4 mg, Q6H PRN  polyethylene glycol, 17 g, Daily PRN  acetaminophen, 650 mg, Q6H PRN   Or  acetaminophen, 650 mg, Q6H PRN  glucose, 4 tablet, PRN  dextrose bolus, 125 mL, PRN   Or  dextrose bolus, 250 mL, PRN  glucagon (rDNA), 1 mg, PRN  dextrose, , Continuous PRN      Diagnostic Labs:  CBC:   Recent Labs     01/08/23  1033 01/09/23  0031 01/10/23  0749   WBC 18.8* 10.6 8.8   RBC 4.30 3.98 3.94*   HGB 12.8 11.8* 11.6*   HCT 40.1 37.2 34.9*   MCV 93.3 93.5 88.6   RDW 14.4 14.4 13.8    132* See Reflexed IPF Result       BMP:   Recent Labs     01/08/23  1033 01/09/23  0031 01/10/23  0749    135 136   K 4.8 3.9 3.5*   * 107 102   CO2 15* 19* 26   BUN 58* 35* 13   CREATININE 1.72* 0.94* 0.58       BNP: No results for input(s): BNP in the last 72 hours.  PT/INR: No results for input(s): PROTIME, INR in the last 72 hours.  APTT: No results for input(s): APTT in the last 72 hours.  CARDIAC ENZYMES: No results for input(s): CKMB, CKMBINDEX, TROPONINI in the last 72  hours.    Invalid input(s): CKTOTAL;3  FASTING LIPID PANEL:  Lab Results   Component Value Date    CHOL 148 09/01/2021    HDL 37 (L) 09/01/2021    TRIG 164 (H) 09/01/2021     LIVER PROFILE:   Recent Labs     01/09/23  0031 01/10/23  0749   * 115*   * 102*   BILIDIR 0.2 0.3*   BILITOT 0.5 0.8   ALKPHOS 62 71        MICROBIOLOGY:   Lab Results   Component Value Date/Time    CULTURE  01/08/2023 01:25 PM     PRESUMPTIVE ID: Susan albicans/dubliniensis HEAVY GROWTH       Imaging:    CT ABDOMEN PELVIS WO CONTRAST Additional Contrast? None    Result Date: 1/8/2023  1. Moderately dilated and fluid-filled distal esophagus and stomach, slightly improved when compared to the previous CT chest dated 01/07/2023. There is no abnormal small bowel distention or evidence of obstructing gastric mass, and findings may be related to gastroparesis. 2.  Unchanged patchy airspace consolidation at the visualized lung bases. 3.  Hepatic steatosis. CT CHEST WO CONTRAST    Result Date: 1/8/2023  Diffuse patchy left lung pneumonia with minimal involvement on the right. The esophagus is fluid-filled and markedly dilated measuring 5.5 cm distally. Stomach has a similar appearance. This may extend into the partially visualized proximal most duodenum. Follow-up postcontrast CT abdomen/pelvis recommended for further evaluation. Evidence of old granulomatous disease. US RENAL COMPLETE    Result Date: 1/8/2023  No acute pathology or hydronephrosis noted. XR CHEST PORTABLE    Result Date: 1/7/2023  Diffuse left lung infiltrate could represent pneumonia       ASSESSMENT & PLAN     Principal Problem (Resolved):     Altered mental status  Active Problems:    Opiate overdose (HCC)    Acute respiratory failure with hypoxia (HCC)    Elevated troponin    Rhabdomyolysis    FUNMILAYO (acute kidney injury) (HCC)    Hyperkalemia    Esophageal dilatation    Metabolic acidosis    Polysubstance abuse (HCC)    Arterial hypotension Gastroesophageal reflux disease     Acute respiratory failure with hypoxia (HCC)  Plan:   Improved/Resolved  Was likely secondary to drug overdose. Maintaing oxygen saturation >95% on RA  Follow-up with an echo  Left ventricle is normal in size. Global left ventricular systolic function  is normal. Calculated EF via 3D Heart Model is 60 %. Lactic acid resolved, 1.6  Respiratory culture growing Candida albicans/dubliniensis heavy growth, will consult ID. Elevated troponin  Plan:   Most likely secondary to cocaine overdose, demand ischemia  Patient denies any chest pain  Troponin 125->110->104->68  Follow-up with repeat level  Cardiology following, appreciate recommendations. Follow-up with echo. Left ventricle is normal in size. Global left ventricular systolic function  is normal. Calculated EF via 3D Heart Model is 60 %. No cardiology workup required, Cardio signed off    Rhabdomyolysis  Plan:   Nephrology signed off  Renal functions improving    D/C fluids  Strict DIEGO's  CK trending down. 8038->96949->4130  Myoglobin trending down. 3581->5454  BUN/creatinine trending down 58>35>13/1.95>0.9>0.5  Oral Bicarb started      FUNMILAYO (acute kidney injury) ([facility] [address]  Plan:   2/2 rhabdomyolysis  Nephrology signed off  Renal functions improving    D/C fluids  Strict DIEGO's  CK trending down. 2725->87305->4610  Myoglobin trending down. 0970->7904  BUN/creatinine trending down 58>35>13/1.95>0.9>0.5  Oral Bicarb started    Altered mental status  Plan:   Resolved  Notes likely secondary to polysubstance abuse, UDS showing cocaine positive, cannabinoid positive and fentanyl positive. Patient oriented x3, denies any suicidal intention and thoughts. Continue to monitor and follow-up with BMPs. Replace as needed    Esophageal dilatation  Plan:   CT chest showing dilated esophagus, CT abdomen suggestive of gastroparesis  GI consulted, appreciate recommendations.   Reglan to 5 mg PO before meals and at bedtime  On discharge no more than 3 weeks duration then 1 week holiday. Low fiber, easy to chew foods  EGD may be outpatient  To follow-up with her established gastroenterologist Dr. Agus Magdaleno - at discharge for ongoing management of her GI issues and Reglan  Protonix 40 Mg PO daily    Metabolic acidosis  Plan:   Secondary to rhabdomyolysis, FUNMILAYO  Resolved     Polysubstance abuse ([facility] [address]  Plan:   Patient counseled. Gastroesophageal reflux disease   Plan:   Protonix 40 Mg PO daily    Opiate overdose ([facility] [address]  Plan:   Improved. Mentation improved by giving Narcan  UDS positive for cocaine, cannabinoid and fentanyl  Monitor for respiratory status    Plan for today: Discharge  Continue current treatment, appreciate nephrology and cardiology recommendations, follow-up with echo. Diet: Adult diet clear liquid  DVT ppx : Heparin subcu  GI ppx: Protonix 40 Mg IV twice daily    PT/OT/SW: PT OT following, appreciate recommendations. Patient is followed at risk with decreased balance and endurance, will benefit from  Ability. oJby Vazquez M.D. Internal Medicine Resident PGY-1  [address]  12:58 PM 1/10/2023     Please note that part of this chart was generated using voice recognition dictation software. Although every effort was made to ensure the accuracy of this automated transcription, some errors in transcription may have occurred.

## 2023-01-10 NOTE — PROGRESS NOTES
[facility]. [facility]enterology Progress Note    Tom Masters is a 38 y.o. female patient. Hospitalization Day:3      Chief consult reason: Esophageal dilation      Subjective: Patient seen and examined. Patient overall feels well. Denies any abdominal pain or abdominal distention. Patient currently tolerating diet. Patient's had several loose bowel movements. Objective:   VITALS:  /82   Pulse 76   Temp 98.2 °F (36.8 °C) (Oral)   Resp 20   Ht 5' 2\" (1.575 m)   Wt 166 lb 0.1 oz (75.3 kg)   LMP 2013   SpO2 96%   BMI 30.36 kg/m²   TEMPERATURE:  Current - Temp: 98.2 °F (36.8 °C);  Max - Temp  Av.8 °F (37.1 °C)  Min: 98.2 °F (36.8 °C)  Max: 100.1 °F (37.8 °C)    Physical Assessment:  General appearance:  alert, cooperative and no distress  Mental Status:  oriented to person, place and time and normal affect  Lungs:  clear to auscultation bilaterally, normal effort  Heart:  regular rate and rhythm, no murmur  Abdomen:  soft, obese, nontender, nondistended, normal bowel sounds, no masses  Extremities:  no edema, no redness, No clubbing  Skin:  warm, dry, no gross lesions or rashes    CURRENT MEDICATIONS:  Scheduled Meds:   metoclopramide  10 mg IntraVENous 4x Daily AC & HS    sodium bicarbonate  650 mg Oral BID    piperacillin-tazobactam  3,375 mg IntraVENous Q8H    pantoprazole (PROTONIX) 40 mg injection  40 mg IntraVENous Q12H    sodium chloride  20 mL/kg IntraVENous Once    sodium chloride flush  5-40 mL IntraVENous 2 times per day    heparin (porcine)  5,000 Units SubCUTAneous 3 times per day    insulin regular  10 Units IntraVENous Once    And    dextrose bolus  250 mL IntraVENous Once     Continuous Infusions:   sodium chloride 10 mL/hr at 23 1755    dextrose       PRN Meds:sodium chloride flush, sodium chloride, ondansetron **OR** ondansetron, polyethylene glycol, acetaminophen **OR** acetaminophen, glucose, dextrose bolus **OR** dextrose bolus, glucagon (rDNA), dextrose      Data Review:  LABS and IMAGING:     CBC  Recent Labs     01/07/23  1245 01/08/23  1033 01/09/23  0031   WBC 26.7* 18.8* 10.6   HGB 17.2* 12.8 11.8*   HCT 56.5* 40.1 37.2   MCV 97.1 93.3 93.5   MCHC 30.4 31.9 31.7   RDW 13.9 14.4 14.4    169 132*         Immature PLTs   No results found for: PLTFLUORE    ANEMIA STUDIES  No results for input(s): LABIRON, TIBC, IRON, FERRITIN, CHSIBAYO57, FOLATE, OCCULTBLD in the last 72 hours. BMP  Recent Labs     01/07/23  2314 01/08/23  0119 01/08/23  1033 01/09/23  0031     --  137 135   K 5.4* 5.1 4.8 3.9     --  108* 107   CO2 14*  --  15* 19*   BUN 49*  --  58* 35*   CREATININE 1.95*  --  1.72* 0.94*   GLUCOSE 112*  --  137* 96   CALCIUM 8.0*  --  8.1* 8.2*         LFTS  Recent Labs     01/07/23  1245 01/09/23  0031   ALKPHOS 220* 62   ALT 83* 129*   * 249*   BILITOT 0.3 0.5   BILIDIR  --  0.2   LABALBU 4.4 2.8*         AMYLASE/LIPASE/AMMONIA  No results for input(s): AMYLASE, LIPASE, AMMONIA in the last 72 hours.     Acute Hepatitis Panel   Lab Results   Component Value Date/Time    HEPBSAG NONREACTIVE 01/08/2023 07:22 PM    HEPCAB NONREACTIVE 01/08/2023 07:22 PM    HEPBIGM NONREACTIVE 01/08/2023 07:22 PM    HEPAIGM NONREACTIVE 01/08/2023 07:22 PM       HCV Genotype   No results found for: HEPATITISCGENOTYPE    HCV Quantitative   No results found for: HCVQNT    LIVER WORK UP:    AFP  Lab Results   Component Value Date/Time    AFP 1.4 05/19/2016 11:54 AM       Alpha 1 antitrypsin   No results found for: A1A    Anti - Liver/Kidney Ab  No results found for: LIVER-KIDNEYMICROSOMALAB    ADRIEL  Lab Results   Component Value Date/Time    ADRIEL NEGATIVE 01/08/2023 07:22 PM       AMA  Lab Results   Component Value Date/Time    MITOAB 1.7 05/19/2016 11:54 AM       ASMA  Lab Results   Component Value Date/Time    SMOOTHMUSCAB 4 05/19/2016 11:54 AM       Ceruloplasmin  No results found for: CERULOPLSM    Celiac panel  No results found for: TISSTRNTIIGG, TTGIGA, IGA    IgG  No results found for: IGG    IgM  No results found for: IGM    GGT   No results found for: LABGGT    PT/INR  No results for input(s): PROTIME, INR in the last 72 hours. Cancer Markers:  CEA:  No results found for: CEA  Ca 125:  No results found for:   Ca 19-9:   No results found for:   AFP:   Lab Results   Component Value Date/Time    AFP 1.4 05/19/2016 11:54 AM       Lactic acid:No results for input(s): LACTACIDWB in the last 72 hours. Radiology Review:    CT ABDOMEN PELVIS WO CONTRAST Additional Contrast? None    Result Date: 1/8/2023  EXAMINATION: CT OF THE ABDOMEN AND PELVIS WITHOUT CONTRAST 1/8/2023 8:46 am TECHNIQUE: CT of the abdomen and pelvis was performed without the administration of intravenous contrast. Multiplanar reformatted images are provided for review. Automated exposure control, iterative reconstruction, and/or weight based adjustment of the mA/kV was utilized to reduce the radiation dose to as low as reasonably achievable. COMPARISON: CT chest dated 01/07/2023. CT abdomen and pelvis dated 01/15/2020. HISTORY: ORDERING SYSTEM PROVIDED HISTORY: esophagus stomach dilatation TECHNOLOGIST PROVIDED HISTORY: Esophagus stomach dilatation Is the patient pregnant?->No Reason for Exam: STOMACH ESOPHAGUS DILATATION FINDINGS: Lower Chest: Heart is normal in size. No pericardial effusion. Distal esophagus is again noted to be fluid-filled and dilated, although improved when compared to 01/07/2023. Patchy airspace consolidation at the visualized lung bases is not significantly changed from 01/07/2023. No pleural effusion. Organs: Liver is diffusely hypoattenuating. Gallbladder is surgically absent. Spleen, pancreas, and adrenal glands have a grossly unremarkable unenhanced appearance. Kidneys are symmetric in size and attenuation. No renal or ureteral calculus. No hydronephrosis or perinephric inflammation.  GI/Bowel: Stomach is moderately distended and fluid-filled, improved when compared to 01/07/2023. No abnormal small bowel distention. There is scattered colonic diverticulosis. No focal pericolonic inflammation. No free air or ascites. Appendix is normal. Pelvis: Urinary bladder is within normal limits. Uterus appears to be surgically absent. Peritoneum/Retroperitoneum: The abdominal aorta is normal in caliber. There is no retroperitoneal or mesenteric lymphadenopathy. Bones/Soft Tissues: There is no acute or suspicious osseous abnormality. Visualized superficial soft tissues are within normal limits. 1.  Moderately dilated and fluid-filled distal esophagus and stomach, slightly improved when compared to the previous CT chest dated 01/07/2023. There is no abnormal small bowel distention or evidence of obstructing gastric mass, and findings may be related to gastroparesis. 2.  Unchanged patchy airspace consolidation at the visualized lung bases. 3.  Hepatic steatosis. CT CHEST WO CONTRAST    Result Date: 1/8/2023  EXAMINATION: CT OF THE CHEST WITHOUT CONTRAST 1/7/2023 10:41 pm TECHNIQUE: CT of the chest was performed without the administration of intravenous contrast. Multiplanar reformatted images are provided for review. Automated exposure control, iterative reconstruction, and/or weight based adjustment of the mA/kV was utilized to reduce the radiation dose to as low as reasonably achievable. COMPARISON: Portable chest obtained approximately 9 hours earlier. HISTORY: ORDERING SYSTEM PROVIDED HISTORY: MULTIFOCAL pna TECHNOLOGIST PROVIDED HISTORY: MULTIFOCAL pna Is the patient pregnant?->No Reason for Exam: MULTIFOCAL pna FINDINGS: Mediastinum: The esophagus is fluid-filled and markedly dilated measuring 5.5 cm diameter distally. At the level of the distal trachea, it measures 3.3 x 2.1 cm. No evidence of mediastinal, hilar or axillary lymphadenopathy. Aorta is normal in caliber without acute abnormality. The central airways are clear.   A calcified subcarinal lymph node is noted. Lungs/pleura: Patchy airspace opacity is present throughout the left lung and to a minimal degree on the right. A partially calcified granuloma is present posteriorly in the right lower lobe inferiorly. No pneumothorax or pleural effusion. Upper Abdomen: Limited imaging of the upper abdomen discloses moderately to markedly dilated stomach, which is entirely fluid-filled. The proximal most portion of the duodenum is partially visualized and may also be dilated. Multiple calcified splenic granulomas are noted. Soft Tissues/Bones: No acute bone or soft tissue abnormality evident. Diffuse patchy left lung pneumonia with minimal involvement on the right. The esophagus is fluid-filled and markedly dilated measuring 5.5 cm distally. Stomach has a similar appearance. This may extend into the partially visualized proximal most duodenum. Follow-up postcontrast CT abdomen/pelvis recommended for further evaluation. Evidence of old granulomatous disease. US RENAL COMPLETE    Result Date: 1/8/2023  EXAM: US Retroperitoneal Complete, Renal EXAM DATE/TIME: 1/8/2023 9:04 am CLINICAL HISTORY: ORDERING SYSTEM PROVIDED  FUNMILAYO  TECHNOLOGIST PROVIDED HISTORY:  FUNMILAYO TECHNIQUE: Real-time complete ultrasound of the retroperitoneum with image documentation. COMPARISON: CT scan of the abdomen and pelvis 01/08/2023 and 01/15/2020 FINDINGS: Right kidney:  No acute findings. No stones. No hydronephrosis. The right kidney measures 10.3 x 4.4 x 4.1 cm. Left kidney:  No acute findings. No stones. No hydronephrosis. The left kidney measures 10.8 x 4.5 x 3.6 cm. Bladder:  1.9 x 1.8 x 1.2 cm hypoechoic area posterior to the left bladder is of indeterminate etiology but may be related to the vaginal cuff as the patient has undergone prior hysterectomy. No abnormalities or significant changes noted on CT scans dating back to 2020. Prevoid volume of the urinary bladder was 124 mL.   Neither ureteral jet was noted on evaluation of the urinary bladder. No acute pathology or hydronephrosis noted. XR CHEST PORTABLE    Result Date: 1/7/2023  EXAMINATION: ONE XRAY VIEW OF THE CHEST 1/7/2023 12:57 pm COMPARISON: 09/25/2022 HISTORY: ORDERING SYSTEM PROVIDED HISTORY: OD, narcan TECHNOLOGIST PROVIDED HISTORY: OD, narcan Reason for Exam: port upright FINDINGS: Heart size normal.  Diffuse infiltrate throughout the left lung. No pneumothorax. No pleural effusion. Diffuse left lung infiltrate could represent pneumonia         ENDOSCOPY     Principal Problem (Resolved): Altered mental status  Active Problems:    Opiate overdose (HCC)    Acute respiratory failure with hypoxia (HCC)    Elevated troponin    Rhabdomyolysis    FUNMILAYO (acute kidney injury) (HCC)    Hyperkalemia    Esophageal dilatation    Metabolic acidosis    Polysubstance abuse (HCC)    Arterial hypotension    Gastroesophageal reflux disease        GI Assessment:  53-year-old female with a PMH of anxiety, depression, drug abuse, GERD, T2DM, menorrhagia, hypertension, who presented to the hospital after being found down. Downtime unknown. Patient responded to Narcan. Work-up in the ED found drug screen was positive for cocaine, fentanyl, and cannabinoids. Additional labs consistent with rhabdomyolysis with myoglobin 6930, CK 1000 512 and 14,416. Troponin level 125. CTA chest showed patchy left lung pneumonia and fluid-filled esophagus with marked dilation prompting GI consult for esophageal dilation. Patient admitted for narcotic overdose. 1. Esophageal and gastric distention - suspect underlying gastroparesis. -Patient reports EGD 10/2022 that showed reflux.    - Patient denies any recent nausea or vomiting  2. GERD - Dunn's esophagus on prior EGD 2015  3. Suspected overdose -patient was positive for cocaine, fentanyl, and cannabinoids  4. Abnormal LFTs -possibly secondary to rhabdo vs fatty liver vs other.   Will continue to trend lab  5. Elevated troponin levels  6. FUNMILAYO        Plan of care:  Change Reglan to 5 mg PO before meals and at bedtime.  Recommend no more than 3 weeks duration then 1 week holiday.  This was discussed with patient.  Also discussed potential side effects that included tardive dyskinesia.  Patient is aware if she has any abnormal signs of twitching or unusual anxiety, she is to discontinue Reglan.  Low fiber, easy to chew foods  Protonix 40 mg Daily --   Patient to follow-up with her established gastroenterologist Dr. Noel - at discharge for ongoing management of her GI issues and Reglan  Okay to be discharged from GI perspective-discussed with primary team - GI will sign off      Time spent reviewing chart, seeing patient, and discussing with attending: Around 30 minutes    This plan was formulated in collaboration with Dr. Luther  Please feel free to contact me with any questions or concerns.   Thank you for allowing me to participate in the care of your patient.      Maddie Friedman, APRN - CNP on 1/10/2023 at 7:06 AM   [facility]    Please note that this note was generated using a voice recognition dictation software.  Although every effort was made to ensure the accuracy of this automated transcription, some errors in transcription may have occurred.

## 2023-01-10 NOTE — DISCHARGE INSTRUCTIONS
Please continue taking Protonix daily and reglan before meals and nightly, take for 3 weeks, take break for 1 week in between and start taking again, follow up with Dr. Gabriel Hansen , your GI doctor for the same. May need to get EGD to evaluate for esophageal dilation. We have started you on bicarbonate tablets, kidney function is improving, please make sure you get another lab test in a week to make sure it is good, follow up with PCP for same.     Complete course of antibiotics Augmentin and Diflucan for pneumonia for which you were being treated in hospital.

## 2023-01-11 LAB
ALBUMIN (CALCULATED): 3.4 G/DL (ref 3.2–5.2)
ALBUMIN PERCENT: 64 % (ref 45–65)
ALPHA 1 PERCENT: 4 % (ref 3–6)
ALPHA 2 PERCENT: 12 % (ref 6–13)
ALPHA-1-GLOBULIN: 0.2 G/DL (ref 0.1–0.4)
ALPHA-2-GLOBULIN: 0.6 G/DL (ref 0.5–0.9)
BETA GLOBULIN: 0.6 G/DL (ref 0.5–1.1)
BETA PERCENT: 11 % (ref 11–19)
GAMMA GLOBULIN %: 10 % (ref 9–20)
GAMMA GLOBULIN: 0.5 G/DL (ref 0.5–1.5)
PATHOLOGIST: ABNORMAL
PATHOLOGIST: NORMAL
PROTEIN ELECTROPHORESIS, SERUM: ABNORMAL
SERUM IFX INTERP: NORMAL
TOTAL PROT. SUM,%: 101 % (ref 98–102)
TOTAL PROT. SUM: 5.3 G/DL (ref 6.3–8.2)
TOTAL PROTEIN: 5.3 G/DL (ref 6.4–8.3)

## 2023-01-12 LAB
CULTURE: NORMAL
CULTURE: NORMAL
Lab: NORMAL
Lab: NORMAL
SPECIMEN DESCRIPTION: NORMAL
SPECIMEN DESCRIPTION: NORMAL

## 2023-01-13 ENCOUNTER — APPOINTMENT (OUTPATIENT)
Dept: GENERAL RADIOLOGY | Age: 43
DRG: 139 | End: 2023-01-13
Payer: MEDICAID

## 2023-01-13 ENCOUNTER — HOSPITAL ENCOUNTER (INPATIENT)
Age: 43
LOS: 2 days | Discharge: HOME | DRG: 139 | End: 2023-01-15
Attending: EMERGENCY MEDICINE | Admitting: INTERNAL MEDICINE
Payer: MEDICAID

## 2023-01-13 DIAGNOSIS — J44.9 CHRONIC OBSTRUCTIVE PULMONARY DISEASE, UNSPECIFIED COPD TYPE (HCC): Primary | ICD-10-CM

## 2023-01-13 DIAGNOSIS — J18.9 PNEUMONIA OF LEFT LUNG DUE TO INFECTIOUS ORGANISM, UNSPECIFIED PART OF LUNG: ICD-10-CM

## 2023-01-13 DIAGNOSIS — J18.9 COMMUNITY ACQUIRED PNEUMONIA, UNSPECIFIED LATERALITY: ICD-10-CM

## 2023-01-13 DIAGNOSIS — J84.9 INTERSTITIAL PNEUMONIA OF BOTH LUNGS (HCC): ICD-10-CM

## 2023-01-13 PROBLEM — J16.0 CAP (COMMUNITY ACQUIRED PNEUMONIA) DUE TO CHLAMYDIA SPECIES: Status: ACTIVE | Noted: 2023-01-13

## 2023-01-13 LAB
ABSOLUTE EOS #: 0.2 K/UL (ref 0–0.4)
ABSOLUTE LYMPH #: 1.5 K/UL (ref 1–4.8)
ABSOLUTE MONO #: 0.8 K/UL (ref 0.1–1.3)
ACETAMINOPHEN LEVEL: <5 UG/ML (ref 10–30)
ALBUMIN SERPL-MCNC: 3.4 G/DL (ref 3.5–5.2)
ALP BLD-CCNC: 76 U/L (ref 35–104)
ALT SERPL-CCNC: 55 U/L (ref 5–33)
AMPHETAMINE SCREEN URINE: NEGATIVE
ANION GAP SERPL CALCULATED.3IONS-SCNC: 7 MMOL/L (ref 9–17)
AST SERPL-CCNC: 33 U/L
BACTERIA: NORMAL
BARBITURATE SCREEN URINE: NEGATIVE
BASOPHILS # BLD: 0 % (ref 0–2)
BASOPHILS ABSOLUTE: 0 K/UL (ref 0–0.2)
BENZODIAZEPINE SCREEN, URINE: NEGATIVE
BILIRUB SERPL-MCNC: 0.3 MG/DL (ref 0.3–1.2)
BILIRUBIN URINE: NEGATIVE
BUN BLDV-MCNC: 10 MG/DL (ref 6–20)
CALCIUM SERPL-MCNC: 9.1 MG/DL (ref 8.6–10.4)
CANNABINOID SCREEN URINE: POSITIVE
CASTS UA: NORMAL /LPF
CHLORIDE BLD-SCNC: 105 MMOL/L (ref 98–107)
CO2: 29 MMOL/L (ref 20–31)
COCAINE METABOLITE, URINE: NEGATIVE
COLOR: YELLOW
CREAT SERPL-MCNC: 0.72 MG/DL (ref 0.5–0.9)
EOSINOPHILS RELATIVE PERCENT: 2 % (ref 0–4)
EPITHELIAL CELLS UA: NORMAL /HPF
ETHANOL PERCENT: <0.01 %
ETHANOL: <10 MG/DL
FENTANYL URINE: POSITIVE
GFR SERPL CREATININE-BSD FRML MDRD: >60 ML/MIN/1.73M2
GLUCOSE BLD-MCNC: 168 MG/DL (ref 70–99)
GLUCOSE URINE: NEGATIVE
HCT VFR BLD CALC: 35.2 % (ref 36–46)
HEMOGLOBIN: 11.7 G/DL (ref 12–16)
INFLUENZA A: NOT DETECTED
INFLUENZA B: NOT DETECTED
KETONES, URINE: NEGATIVE
LEUKOCYTE ESTERASE, URINE: ABNORMAL
LIPASE: 159 U/L (ref 13–60)
LYMPHOCYTES # BLD: 19 % (ref 24–44)
MCH RBC QN AUTO: 29.6 PG (ref 26–34)
MCHC RBC AUTO-ENTMCNC: 33.4 G/DL (ref 31–37)
MCV RBC AUTO: 88.8 FL (ref 80–100)
METHADONE SCREEN, URINE: NEGATIVE
MONOCYTES # BLD: 10 % (ref 1–7)
MYOGLOBIN: 79 NG/ML (ref 25–58)
NITRITE, URINE: NEGATIVE
OPIATES, URINE: NEGATIVE
OXYCODONE SCREEN URINE: NEGATIVE
PDW BLD-RTO: 14.7 % (ref 11.5–14.9)
PH UA: 5.5 (ref 5–8)
PHENCYCLIDINE, URINE: NEGATIVE
PLATELET # BLD: 237 K/UL (ref 150–450)
PMV BLD AUTO: 7.7 FL (ref 6–12)
POTASSIUM SERPL-SCNC: 4 MMOL/L (ref 3.7–5.3)
PROTEIN UA: NEGATIVE
RBC # BLD: 3.96 M/UL (ref 4–5.2)
RBC UA: NORMAL /HPF
SALICYLATE LEVEL: <1 MG/DL (ref 3–10)
SARS-COV-2 RNA, RT PCR: NOT DETECTED
SEG NEUTROPHILS: 69 % (ref 36–66)
SEGMENTED NEUTROPHILS ABSOLUTE COUNT: 5.5 K/UL (ref 1.3–9.1)
SODIUM BLD-SCNC: 141 MMOL/L (ref 135–144)
SOURCE: NORMAL
SPECIFIC GRAVITY UA: 1.02 (ref 1–1.03)
SPECIMEN DESCRIPTION: NORMAL
TEST INFORMATION: ABNORMAL
TOTAL CK: 389 U/L (ref 26–192)
TOTAL PROTEIN: 6.3 G/DL (ref 6.4–8.3)
TRICYCLIC ANTIDEP,URINE: NEGATIVE
TROPONIN, HIGH SENSITIVITY: 13 NG/L (ref 0–14)
TROPONIN, HIGH SENSITIVITY: 14 NG/L (ref 0–14)
TSH SERPL DL<=0.05 MIU/L-ACNC: 1.53 UIU/ML (ref 0.3–5)
TURBIDITY: CLEAR
URINE HGB: NEGATIVE
UROBILINOGEN, URINE: NORMAL
WBC # BLD: 8 K/UL (ref 3.5–11)
WBC UA: NORMAL /HPF

## 2023-01-13 PROCEDURE — 81001 URINALYSIS AUTO W/SCOPE: CPT

## 2023-01-13 PROCEDURE — 87636 SARSCOV2 & INF A&B AMP PRB: CPT

## 2023-01-13 PROCEDURE — 6370000000 HC RX 637 (ALT 250 FOR IP): Performed by: STUDENT IN AN ORGANIZED HEALTH CARE EDUCATION/TRAINING PROGRAM

## 2023-01-13 PROCEDURE — 6360000002 HC RX W HCPCS: Performed by: STUDENT IN AN ORGANIZED HEALTH CARE EDUCATION/TRAINING PROGRAM

## 2023-01-13 PROCEDURE — 83690 ASSAY OF LIPASE: CPT

## 2023-01-13 PROCEDURE — 2580000003 HC RX 258: Performed by: STUDENT IN AN ORGANIZED HEALTH CARE EDUCATION/TRAINING PROGRAM

## 2023-01-13 PROCEDURE — 80307 DRUG TEST PRSMV CHEM ANLYZR: CPT

## 2023-01-13 PROCEDURE — 82550 ASSAY OF CK (CPK): CPT

## 2023-01-13 PROCEDURE — 36415 COLL VENOUS BLD VENIPUNCTURE: CPT

## 2023-01-13 PROCEDURE — 83874 ASSAY OF MYOGLOBIN: CPT

## 2023-01-13 PROCEDURE — 84443 ASSAY THYROID STIM HORMONE: CPT

## 2023-01-13 PROCEDURE — 80179 DRUG ASSAY SALICYLATE: CPT

## 2023-01-13 PROCEDURE — 99285 EMERGENCY DEPT VISIT HI MDM: CPT

## 2023-01-13 PROCEDURE — 6370000000 HC RX 637 (ALT 250 FOR IP): Performed by: NURSE PRACTITIONER

## 2023-01-13 PROCEDURE — 2580000003 HC RX 258: Performed by: NURSE PRACTITIONER

## 2023-01-13 PROCEDURE — 96374 THER/PROPH/DIAG INJ IV PUSH: CPT

## 2023-01-13 PROCEDURE — 93005 ELECTROCARDIOGRAM TRACING: CPT

## 2023-01-13 PROCEDURE — 71045 X-RAY EXAM CHEST 1 VIEW: CPT

## 2023-01-13 PROCEDURE — 87807 RSV ASSAY W/OPTIC: CPT

## 2023-01-13 PROCEDURE — 84484 ASSAY OF TROPONIN QUANT: CPT

## 2023-01-13 PROCEDURE — 85025 COMPLETE CBC W/AUTO DIFF WBC: CPT

## 2023-01-13 PROCEDURE — 80143 DRUG ASSAY ACETAMINOPHEN: CPT

## 2023-01-13 PROCEDURE — 1200000000 HC SEMI PRIVATE

## 2023-01-13 PROCEDURE — 80053 COMPREHEN METABOLIC PANEL: CPT

## 2023-01-13 PROCEDURE — G0480 DRUG TEST DEF 1-7 CLASSES: HCPCS

## 2023-01-13 RX ORDER — ONDANSETRON 4 MG/1
4 TABLET, ORALLY DISINTEGRATING ORAL EVERY 8 HOURS PRN
Status: DISCONTINUED | OUTPATIENT
Start: 2023-01-13 | End: 2023-01-15 | Stop reason: HOSPADM

## 2023-01-13 RX ORDER — DIPHENHYDRAMINE HYDROCHLORIDE 50 MG/ML
25 INJECTION INTRAMUSCULAR; INTRAVENOUS ONCE
Status: COMPLETED | OUTPATIENT
Start: 2023-01-13 | End: 2023-01-13

## 2023-01-13 RX ORDER — BENZONATATE 100 MG/1
100 CAPSULE ORAL ONCE
Status: COMPLETED | OUTPATIENT
Start: 2023-01-13 | End: 2023-01-13

## 2023-01-13 RX ORDER — IPRATROPIUM BROMIDE AND ALBUTEROL SULFATE 2.5; .5 MG/3ML; MG/3ML
1 SOLUTION RESPIRATORY (INHALATION)
Status: DISCONTINUED | OUTPATIENT
Start: 2023-01-14 | End: 2023-01-15 | Stop reason: HOSPADM

## 2023-01-13 RX ORDER — ONDANSETRON 2 MG/ML
4 INJECTION INTRAMUSCULAR; INTRAVENOUS EVERY 6 HOURS PRN
Status: DISCONTINUED | OUTPATIENT
Start: 2023-01-13 | End: 2023-01-15 | Stop reason: HOSPADM

## 2023-01-13 RX ORDER — SODIUM CHLORIDE 9 MG/ML
INJECTION, SOLUTION INTRAVENOUS CONTINUOUS
Status: DISCONTINUED | OUTPATIENT
Start: 2023-01-13 | End: 2023-01-14

## 2023-01-13 RX ORDER — SODIUM CHLORIDE 0.9 % (FLUSH) 0.9 %
10 SYRINGE (ML) INJECTION PRN
Status: DISCONTINUED | OUTPATIENT
Start: 2023-01-13 | End: 2023-01-15 | Stop reason: HOSPADM

## 2023-01-13 RX ORDER — SODIUM CHLORIDE 0.9 % (FLUSH) 0.9 %
5-40 SYRINGE (ML) INJECTION EVERY 12 HOURS SCHEDULED
Status: DISCONTINUED | OUTPATIENT
Start: 2023-01-13 | End: 2023-01-15 | Stop reason: HOSPADM

## 2023-01-13 RX ORDER — ENOXAPARIN SODIUM 100 MG/ML
40 INJECTION SUBCUTANEOUS DAILY
Status: DISCONTINUED | OUTPATIENT
Start: 2023-01-14 | End: 2023-01-15 | Stop reason: HOSPADM

## 2023-01-13 RX ORDER — ACETAMINOPHEN 650 MG/1
650 SUPPOSITORY RECTAL EVERY 6 HOURS PRN
Status: DISCONTINUED | OUTPATIENT
Start: 2023-01-13 | End: 2023-01-15 | Stop reason: HOSPADM

## 2023-01-13 RX ORDER — ALBUTEROL SULFATE 2.5 MG/3ML
2.5 SOLUTION RESPIRATORY (INHALATION)
Status: DISCONTINUED | OUTPATIENT
Start: 2023-01-13 | End: 2023-01-15 | Stop reason: HOSPADM

## 2023-01-13 RX ORDER — ACETAMINOPHEN 325 MG/1
650 TABLET ORAL EVERY 6 HOURS PRN
Status: DISCONTINUED | OUTPATIENT
Start: 2023-01-13 | End: 2023-01-15 | Stop reason: HOSPADM

## 2023-01-13 RX ORDER — 0.9 % SODIUM CHLORIDE 0.9 %
1000 INTRAVENOUS SOLUTION INTRAVENOUS ONCE
Status: COMPLETED | OUTPATIENT
Start: 2023-01-13 | End: 2023-01-13

## 2023-01-13 RX ORDER — SODIUM CHLORIDE 9 MG/ML
INJECTION, SOLUTION INTRAVENOUS PRN
Status: DISCONTINUED | OUTPATIENT
Start: 2023-01-13 | End: 2023-01-15 | Stop reason: HOSPADM

## 2023-01-13 RX ADMIN — SODIUM CHLORIDE, PRESERVATIVE FREE 10 ML: 5 INJECTION INTRAVENOUS at 23:45

## 2023-01-13 RX ADMIN — SODIUM CHLORIDE: 9 INJECTION, SOLUTION INTRAVENOUS at 23:45

## 2023-01-13 RX ADMIN — CEFTRIAXONE SODIUM 1000 MG: 1 INJECTION, POWDER, FOR SOLUTION INTRAMUSCULAR; INTRAVENOUS at 20:38

## 2023-01-13 RX ADMIN — DIPHENHYDRAMINE HYDROCHLORIDE 25 MG: 50 INJECTION, SOLUTION INTRAMUSCULAR; INTRAVENOUS at 18:38

## 2023-01-13 RX ADMIN — AZITHROMYCIN 500 MG: 500 INJECTION, POWDER, LYOPHILIZED, FOR SOLUTION INTRAVENOUS at 21:14

## 2023-01-13 RX ADMIN — SODIUM CHLORIDE 1000 ML: 9 INJECTION, SOLUTION INTRAVENOUS at 18:40

## 2023-01-13 RX ADMIN — Medication 1 LOZENGE: at 18:38

## 2023-01-13 RX ADMIN — BENZONATATE 100 MG: 100 CAPSULE ORAL at 18:40

## 2023-01-13 RX ADMIN — ACETAMINOPHEN 650 MG: 325 TABLET ORAL at 23:17

## 2023-01-13 ASSESSMENT — LIFESTYLE VARIABLES
HOW MANY STANDARD DRINKS CONTAINING ALCOHOL DO YOU HAVE ON A TYPICAL DAY: PATIENT DOES NOT DRINK
HOW OFTEN DO YOU HAVE A DRINK CONTAINING ALCOHOL: NEVER

## 2023-01-13 ASSESSMENT — PAIN SCALES - GENERAL
PAINLEVEL_OUTOF10: 7
PAINLEVEL_OUTOF10: 10

## 2023-01-13 ASSESSMENT — PAIN - FUNCTIONAL ASSESSMENT: PAIN_FUNCTIONAL_ASSESSMENT: NONE - DENIES PAIN

## 2023-01-13 ASSESSMENT — PAIN DESCRIPTION - LOCATION: LOCATION: THROAT

## 2023-01-13 NOTE — ED TRIAGE NOTES
Mode of arrival (squad #, walk in, police, etc) : Walk in        Chief complaint(s): Shortness of breath        Arrival Note (brief scenario, treatment PTA, etc). : Pt arrives to ED c/o shortness of breath. Patient was recently admitted to [facility]. V's for an accidental overdose. Patient was recently discharged but reports still feeling short of breath. Patient also reports right hand pain and swelling.
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
## 2023-01-14 ENCOUNTER — APPOINTMENT (OUTPATIENT)
Dept: GENERAL RADIOLOGY | Age: 43
DRG: 139 | End: 2023-01-14
Payer: MEDICAID

## 2023-01-14 ENCOUNTER — APPOINTMENT (OUTPATIENT)
Dept: CT IMAGING | Age: 43
DRG: 139 | End: 2023-01-14
Payer: MEDICAID

## 2023-01-14 PROBLEM — J18.9 CAP (COMMUNITY ACQUIRED PNEUMONIA): Status: ACTIVE | Noted: 2023-01-13

## 2023-01-14 PROBLEM — J18.9 PNEUMONIA OF LEFT LUNG DUE TO INFECTIOUS ORGANISM: Status: ACTIVE | Noted: 2023-01-14

## 2023-01-14 LAB
ANION GAP SERPL CALCULATED.3IONS-SCNC: 7 MMOL/L (ref 9–17)
BUN BLDV-MCNC: 10 MG/DL (ref 6–20)
CALCIUM SERPL-MCNC: 8.4 MG/DL (ref 8.6–10.4)
CHLORIDE BLD-SCNC: 106 MMOL/L (ref 98–107)
CO2: 25 MMOL/L (ref 20–31)
CREAT SERPL-MCNC: 0.63 MG/DL (ref 0.5–0.9)
GFR SERPL CREATININE-BSD FRML MDRD: >60 ML/MIN/1.73M2
GLUCOSE BLD-MCNC: 101 MG/DL (ref 70–99)
HCT VFR BLD CALC: 33.2 % (ref 36–46)
HEMOGLOBIN: 10.9 G/DL (ref 12–16)
MCH RBC QN AUTO: 29.3 PG (ref 26–34)
MCHC RBC AUTO-ENTMCNC: 32.8 G/DL (ref 31–37)
MCV RBC AUTO: 89.4 FL (ref 80–100)
PDW BLD-RTO: 14.6 % (ref 11.5–14.9)
PLATELET # BLD: 211 K/UL (ref 150–450)
PMV BLD AUTO: 8.3 FL (ref 6–12)
POTASSIUM SERPL-SCNC: 3.6 MMOL/L (ref 3.7–5.3)
RBC # BLD: 3.71 M/UL (ref 4–5.2)
RSV ANTIGEN: NEGATIVE
SODIUM BLD-SCNC: 138 MMOL/L (ref 135–144)
SOURCE: NORMAL
WBC # BLD: 4.8 K/UL (ref 3.5–11)

## 2023-01-14 PROCEDURE — 36415 COLL VENOUS BLD VENIPUNCTURE: CPT

## 2023-01-14 PROCEDURE — 97166 OT EVAL MOD COMPLEX 45 MIN: CPT

## 2023-01-14 PROCEDURE — 97162 PT EVAL MOD COMPLEX 30 MIN: CPT

## 2023-01-14 PROCEDURE — 94640 AIRWAY INHALATION TREATMENT: CPT

## 2023-01-14 PROCEDURE — 6370000000 HC RX 637 (ALT 250 FOR IP): Performed by: NURSE PRACTITIONER

## 2023-01-14 PROCEDURE — 71046 X-RAY EXAM CHEST 2 VIEWS: CPT

## 2023-01-14 PROCEDURE — 85027 COMPLETE CBC AUTOMATED: CPT

## 2023-01-14 PROCEDURE — 2580000003 HC RX 258: Performed by: NURSE PRACTITIONER

## 2023-01-14 PROCEDURE — 80048 BASIC METABOLIC PNL TOTAL CA: CPT

## 2023-01-14 PROCEDURE — 71250 CT THORAX DX C-: CPT

## 2023-01-14 PROCEDURE — 97530 THERAPEUTIC ACTIVITIES: CPT

## 2023-01-14 PROCEDURE — 6360000002 HC RX W HCPCS: Performed by: NURSE PRACTITIONER

## 2023-01-14 PROCEDURE — 94761 N-INVAS EAR/PLS OXIMETRY MLT: CPT

## 2023-01-14 PROCEDURE — 1200000000 HC SEMI PRIVATE

## 2023-01-14 PROCEDURE — 6370000000 HC RX 637 (ALT 250 FOR IP): Performed by: STUDENT IN AN ORGANIZED HEALTH CARE EDUCATION/TRAINING PROGRAM

## 2023-01-14 RX ORDER — DEXLANSOPRAZOLE 60 MG/1
60 CAPSULE, DELAYED RELEASE ORAL NIGHTLY
Status: DISCONTINUED | OUTPATIENT
Start: 2023-01-14 | End: 2023-01-15 | Stop reason: HOSPADM

## 2023-01-14 RX ORDER — ALBUTEROL SULFATE 90 UG/1
2 AEROSOL, METERED RESPIRATORY (INHALATION) EVERY 4 HOURS PRN
Status: DISCONTINUED | OUTPATIENT
Start: 2023-01-14 | End: 2023-01-15 | Stop reason: HOSPADM

## 2023-01-14 RX ORDER — KETOROLAC TROMETHAMINE 30 MG/ML
30 INJECTION, SOLUTION INTRAMUSCULAR; INTRAVENOUS EVERY 6 HOURS PRN
Status: DISCONTINUED | OUTPATIENT
Start: 2023-01-14 | End: 2023-01-14

## 2023-01-14 RX ORDER — CEFDINIR 300 MG/1
300 CAPSULE ORAL EVERY 12 HOURS SCHEDULED
Status: DISCONTINUED | OUTPATIENT
Start: 2023-01-14 | End: 2023-01-15

## 2023-01-14 RX ORDER — DEXLANSOPRAZOLE 60 MG/1
60 CAPSULE, DELAYED RELEASE ORAL NIGHTLY
Status: ON HOLD | COMMUNITY
End: 2023-01-15 | Stop reason: SDUPTHER

## 2023-01-14 RX ORDER — CYCLOBENZAPRINE HCL 10 MG
5 TABLET ORAL 3 TIMES DAILY PRN
Status: DISCONTINUED | OUTPATIENT
Start: 2023-01-14 | End: 2023-01-15 | Stop reason: HOSPADM

## 2023-01-14 RX ORDER — MIRTAZAPINE 15 MG/1
7.5 TABLET, FILM COATED ORAL NIGHTLY
Status: DISCONTINUED | OUTPATIENT
Start: 2023-01-14 | End: 2023-01-15 | Stop reason: HOSPADM

## 2023-01-14 RX ORDER — AZITHROMYCIN 250 MG/1
500 TABLET, FILM COATED ORAL DAILY
Status: DISCONTINUED | OUTPATIENT
Start: 2023-01-14 | End: 2023-01-15 | Stop reason: HOSPADM

## 2023-01-14 RX ORDER — DICYCLOMINE HYDROCHLORIDE 10 MG/1
10 CAPSULE ORAL 4 TIMES DAILY PRN
Status: DISCONTINUED | OUTPATIENT
Start: 2023-01-14 | End: 2023-01-15 | Stop reason: HOSPADM

## 2023-01-14 RX ORDER — BUDESONIDE AND FORMOTEROL FUMARATE DIHYDRATE 80; 4.5 UG/1; UG/1
2 AEROSOL RESPIRATORY (INHALATION) 2 TIMES DAILY
Status: DISCONTINUED | OUTPATIENT
Start: 2023-01-14 | End: 2023-01-15 | Stop reason: HOSPADM

## 2023-01-14 RX ORDER — RISPERIDONE 0.5 MG/1
0.5 TABLET ORAL 2 TIMES DAILY
Status: DISCONTINUED | OUTPATIENT
Start: 2023-01-14 | End: 2023-01-15 | Stop reason: HOSPADM

## 2023-01-14 RX ORDER — METOCLOPRAMIDE 5 MG/1
5 TABLET ORAL
Status: DISCONTINUED | OUTPATIENT
Start: 2023-01-14 | End: 2023-01-15 | Stop reason: HOSPADM

## 2023-01-14 RX ADMIN — MIRTAZAPINE 7.5 MG: 15 TABLET, FILM COATED ORAL at 20:41

## 2023-01-14 RX ADMIN — SODIUM CHLORIDE, PRESERVATIVE FREE 10 ML: 5 INJECTION INTRAVENOUS at 20:41

## 2023-01-14 RX ADMIN — CYCLOBENZAPRINE 5 MG: 10 TABLET, FILM COATED ORAL at 21:50

## 2023-01-14 RX ADMIN — METOCLOPRAMIDE 5 MG: 5 TABLET ORAL at 05:48

## 2023-01-14 RX ADMIN — BUDESONIDE AND FORMOTEROL FUMARATE DIHYDRATE 2 PUFF: 80; 4.5 AEROSOL RESPIRATORY (INHALATION) at 19:36

## 2023-01-14 RX ADMIN — METOCLOPRAMIDE 5 MG: 5 TABLET ORAL at 12:44

## 2023-01-14 RX ADMIN — ENOXAPARIN SODIUM 40 MG: 100 INJECTION SUBCUTANEOUS at 09:10

## 2023-01-14 RX ADMIN — AZITHROMYCIN MONOHYDRATE 500 MG: 250 TABLET ORAL at 12:44

## 2023-01-14 RX ADMIN — RISPERIDONE 0.5 MG: 0.5 TABLET ORAL at 21:50

## 2023-01-14 RX ADMIN — CYCLOBENZAPRINE 5 MG: 10 TABLET, FILM COATED ORAL at 09:26

## 2023-01-14 RX ADMIN — KETOROLAC TROMETHAMINE 30 MG: 30 INJECTION, SOLUTION INTRAMUSCULAR; INTRAVENOUS at 00:49

## 2023-01-14 RX ADMIN — IPRATROPIUM BROMIDE AND ALBUTEROL SULFATE 1 AMPULE: 2.5; .5 SOLUTION RESPIRATORY (INHALATION) at 08:05

## 2023-01-14 RX ADMIN — CEFDINIR 300 MG: 300 CAPSULE ORAL at 21:50

## 2023-01-14 RX ADMIN — IPRATROPIUM BROMIDE AND ALBUTEROL SULFATE 1 AMPULE: 2.5; .5 SOLUTION RESPIRATORY (INHALATION) at 11:55

## 2023-01-14 RX ADMIN — CEFDINIR 300 MG: 300 CAPSULE ORAL at 12:44

## 2023-01-14 RX ADMIN — METOCLOPRAMIDE 5 MG: 5 TABLET ORAL at 00:49

## 2023-01-14 RX ADMIN — IPRATROPIUM BROMIDE AND ALBUTEROL SULFATE 1 AMPULE: 2.5; .5 SOLUTION RESPIRATORY (INHALATION) at 19:36

## 2023-01-14 RX ADMIN — BUDESONIDE AND FORMOTEROL FUMARATE DIHYDRATE 2 PUFF: 80; 4.5 AEROSOL RESPIRATORY (INHALATION) at 08:07

## 2023-01-14 RX ADMIN — CYCLOBENZAPRINE 5 MG: 10 TABLET, FILM COATED ORAL at 00:54

## 2023-01-14 ASSESSMENT — PAIN DESCRIPTION - ORIENTATION: ORIENTATION: RIGHT;MID

## 2023-01-14 ASSESSMENT — ENCOUNTER SYMPTOMS
CHOKING: 1
DIARRHEA: 0
NAUSEA: 0
ABDOMINAL PAIN: 0
SORE THROAT: 1
COUGH: 1
CONSTIPATION: 0
BACK PAIN: 0
VOMITING: 0
RHINORRHEA: 0
SHORTNESS OF BREATH: 1

## 2023-01-14 ASSESSMENT — PAIN DESCRIPTION - LOCATION: LOCATION: BACK;HAND

## 2023-01-14 ASSESSMENT — PAIN SCALES - WONG BAKER: WONGBAKER_NUMERICALRESPONSE: 0

## 2023-01-14 ASSESSMENT — PAIN DESCRIPTION - DESCRIPTORS: DESCRIPTORS: ACHING

## 2023-01-14 ASSESSMENT — PAIN - FUNCTIONAL ASSESSMENT: PAIN_FUNCTIONAL_ASSESSMENT: ACTIVITIES ARE NOT PREVENTED

## 2023-01-14 ASSESSMENT — PAIN SCALES - GENERAL
PAINLEVEL_OUTOF10: 10
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
## 2023-01-14 NOTE — PROGRESS NOTES
Physical Therapy  Facility/Department: [facility] MED SURG  Physical Therapy Initial Assessment    Name: Akil Sanders  : 1980  MRN: [id number]  Date of Service: 2023    Discharge Recommendations:  Home independently   PT Equipment Recommendations  Equipment Needed: No      Patient Diagnosis(es): The encounter diagnosis was Pneumonia of left lung due to infectious organism, unspecified part of lung. Past Medical History:  has a past medical history of Agoraphobia, Anxiety, Depression, Diarrhea, Drug abuse, marijuana, Dysmenorrhea, Endometriosis, Fibroid, GERD (gastroesophageal reflux disease), History of nipple discharge, Hyperlipidemia, Hypertension, Hypothyroidism, Irritable bowel syndrome, LGSIL (low grade squamous intraepithelial dysplasia), Menorrhagia, Midline low back pain without sciatica, Mood swings, Ovarian cyst, Pelvic adhesions, Pelvic pain, Prolactin increased, Rectal bleeding, Type II or unspecified type diabetes mellitus without mention of complication, not stated as uncontrolled, Vocal cord polyps, Vomiting, and Wellness examination. Past Surgical History:  has a past surgical history that includes Cholecystectomy; Tubal ligation; [place] tooth extraction; Ovary removal; laparoscopy (2013); Hysterectomy (13); Enterocele repair (13); Abdominal adhesion surgery (13); Upper gastrointestinal endoscopy; Esophagoscopy (1/22/15); Colonoscopy (14); Colonoscopy (7-14-15); Carpal tunnel release (Right, 2020); and Carpal tunnel release (Right, 2020). Assessment   Body Structures, Functions, Activity Limitations Requiring Skilled Therapeutic Intervention: Decreased functional mobility   Assessment: Pt presented relatively symptom free. However, edema was grossly noted within the LEs. Pt demonstrated independence with bed mobility and transfers. However, an antalgic gait was present.   Treatment Diagnosis: Impaired gait mobility  Specific Instructions for Next Treatment: Initiated a gait and balance program for the LEs. Therapy Prognosis: Excellent  Decision Making: Medium Complexity  History: Obesity  Exam: Impaired gait  Clinical Presentation: Pt's symptoms are stable  Barriers to Learning: None  Activity Tolerance  Activity Tolerance: Patient tolerated evaluation without incident     Plan   Physcial Therapy Plan  General Plan: 3-5 times per week  Therapy Duration: 4-7 Days  Specific Instructions for Next Treatment: Initiated a gait and balance program for the LEs. Current Treatment Recommendations: Functional mobility training, Gait training  Safety Devices  Type of Devices: All fall risk precautions in place, Bed alarm in place, Call light within reach, Gait belt, Left in bed, Sitter present  Restraints  Restraints Initially in Place: No     Restrictions  Restrictions/Precautions  Restrictions/Precautions: Fall Risk, General Precautions, Other (comment) (Suicide precautions)  Required Braces or Orthoses?: Yes (Carpal tunnel wrist splint)  Implants present? :  (Pt denies)  Required Braces or Orthoses  Left Upper Extremity Brace/Splint: Wrist cock up  Left Upper Extremity Brace/Splint: Wears for carpal tunnel, pt reports she left it at home  Position Activity Restriction  Other position/activity restrictions: OT/PT eval and treat, suicide preecautions     Subjective   Pain: No complaints of pain  General  Chart Reviewed: Yes  Patient assessed for rehabilitation services?: Yes  Family / Caregiver Present: No  Referring Practitioner: Kareen Hurley MD  Referral Date : 01/14/23  Diagnosis: CAP (community acquired pneumonia)  Follows Commands: Within Functional Limits  Subjective  Subjective: Pt stated that her legs are currently swollen.  No complaints of pain    Social/Functional History  Social/Functional History  Lives With: Friend(s)  Type of Home: House  Home Layout: Multi-level, Bed/Bath upstairs, Laundry in basement  Home Access: Stairs to enter with rails  Entrance Stairs - Number of Steps: 3 JUDY to back door, 3 JUDY to front door  Entrance Stairs - Rails: Both  Bathroom Shower/Tub: Tub/Shower unit, Walk-in shower, Doors  Bathroom Toilet: Standard  Bathroom Equipment: Hand-held shower  Bathroom Accessibility: Accessible  Home Equipment:  (Pt reports no DME)  Has the patient had two or more falls in the past year or any fall with injury in the past year?: No  ADL Assistance: [facility]: Independent  Homemaking Responsibilities: Yes  Ambulation Assistance: Independent  Transfer Assistance: Independent  Active : No  Patient's  Info: Roommate or mom drives  Mode of Transportation: Car  Type of Occupation: House Keeping  IADL Comments: Pt reports sleeping on regular flat bed  Additional Comments: Pt reports having a roommate named Nicole Gabriel who helps her as able - reports that her roommate works but is able to assist  Vision/Hearing  Vision  Vision: Impaired  Vision Exceptions: Wears glasses at all times  Hearing  Hearing: Within functional limits    Cognition   Orientation  Overall Orientation Status: Within Functional Limits  Orientation Level: Oriented X4  Cognition  Overall Cognitive Status: WFL     Objective   Gross Assessment  AROM: Within functional limits  Strength:  Within functional limits  Coordination: Within functional limits  Tone: Normal  Sensation: Impaired (Decreased sensation with light touch (B) LE)   Bed mobility  Rolling to Left: Independent  Rolling to Right: Independent  Supine to Sit: Independent  Sit to Supine: Independent  Scooting: Independent  Transfers  Sit to Stand: Modified independent  Stand to Sit: Modified independent  Bed to Chair: Modified independent  Ambulation  Surface: Level tile  Device: No Device  Assistance: Stand by assistance  Quality of Gait: \"rigid\" legs with little to no hip or knee motions - antalgic but not unsafe  Gait Deviations: Slow Mariann  Distance: 100 ft    AM-PAC Score  AM-PAC Inpatient Mobility Raw Score : 20 (01/14/23 1452)  AM-PAC Inpatient T-Scale Score : 47.67 (01/14/23 1452)  Mobility Inpatient CMS 0-100% Score: 35.83 (01/14/23 1452)  Mobility Inpatient CMS G-Code Modifier : CJ (01/14/23 1452)    Goals  Short Term Goals  Time Frame for Short Term Goals: 6 visits  Short Term Goal 1: Pt will be able to stand/ambulate 300 ft independently without an antalgic gait. Short Term Goal 2: Pt will be able to tolerate a 30 minute treatment session without a change in symptoms. Patient Goals   Patient Goals :  To get better and go home    Education  Patient Education  Education Given To: Patient  Education Provided: Role of Therapy;Plan of Care  Education Method: Verbal  Barriers to Learning: None  Education Outcome: Verbalized understanding    Therapy Time   Individual Concurrent Group Co-treatment   Time In 1450         Time Out 1530         Minutes [address] [place], [place] DPT

## 2023-01-14 NOTE — ED NOTES
TRANSFER - OUT REPORT:    Verbal report given to Татьяна E Wiliam Temple on Luly Jackson  being transferred to Room 2050 for routine progression of patient care       Report consisted of patient's Situation, Background, Assessment and   Recommendations(SBAR). Information from the following report(s) ED Encounter Summary was reviewed with the receiving nurse. [place] Assessment: Presents to emergency department  because of falls (Syncope, seizure, or loss of consciousness): No, Age > 79: No, Altered Mental Status, Intoxication with alcohol or substance confusion (Disorientation, impaired judgment, poor safety awaremess, or inability to follow instructions): No, Impaired Mobility: Ambulates or transfers with assistive devices or assistance; Unable to ambulate or transer.: No, Nursing Judgement: No  Lines:   Peripheral IV 01/13/23 Left;Ventral Forearm (Active)        Opportunity for questions and clarification was provided.       Patient transported with:  Diaz Brewer RN  71/42/22 [id number]

## 2023-01-14 NOTE — PROGRESS NOTES
Updated Adron Fetch, patients mother of condition and possible discharge plan today. Awaiting psychiatry to come to eval patient.
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
## 2023-01-14 NOTE — PROGRESS NOTES
Per Lab, they do not need a new sample sent down for pcr testing. They will be using her PCR from  yesterday for lab work.

## 2023-01-14 NOTE — PLAN OF CARE
Problem: Discharge Planning  Goal: Discharge to home or other facility with appropriate resources  1/14/2023 1025 by Jayden Quintero RN  Outcome: Progressing  Flowsheets (Taken 1/14/2023 4908)  Discharge to home or other facility with appropriate resources: Identify barriers to discharge with patient and caregiver   Pt to discharge home once medically stable. Problem: Pain  Goal: Verbalizes/displays adequate comfort level or baseline comfort level  1/14/2023 1025 by Jayden Quintero RN  Outcome: Progressing  Flowsheets (Taken 1/14/2023 0807)  Verbalizes/displays adequate comfort level or baseline comfort level: Encourage patient to monitor pain and request assistance   Pt rated pain as high as 10 this shift. Prn flexaril helpful for pain control. Problem: Chronic Conditions and Co-morbidities  Goal: Patient's chronic conditions and co-morbidity symptoms are monitored and maintained or improved  Outcome: Progressing  Flowsheets (Taken 1/14/2023 0916)  Care Plan - Patient's Chronic Conditions and Co-Morbidity Symptoms are Monitored and Maintained or Improved: Monitor and assess patient's chronic conditions and comorbid symptoms for stability, deterioration, or improvement   Monitoring. Pt currently resting. Pain is being controled with prn pain medications. Problem: Safety - Adult  Goal: Free from fall injury  Outcome: Progressing   No injury noted this shift. Problem: ABCDS Injury Assessment  Goal: Absence of physical injury  Outcome: Progressing  Flowsheets (Taken 1/14/2023 0915)  Absence of Physical Injury: Implement safety measures based on patient assessment   No injury noted this shift.

## 2023-01-15 VITALS
HEART RATE: 74 BPM | RESPIRATION RATE: 16 BRPM | OXYGEN SATURATION: 99 % | TEMPERATURE: 98.8 F | DIASTOLIC BLOOD PRESSURE: 71 MMHG | SYSTOLIC BLOOD PRESSURE: 136 MMHG

## 2023-01-15 PROBLEM — J84.9 INTERSTITIAL PNEUMONIA OF BOTH LUNGS (HCC): Status: ACTIVE | Noted: 2023-01-15

## 2023-01-15 LAB
ANION GAP SERPL CALCULATED.3IONS-SCNC: 7 MMOL/L (ref 9–17)
BUN BLDV-MCNC: 9 MG/DL (ref 6–20)
CALCIUM SERPL-MCNC: 8.5 MG/DL (ref 8.6–10.4)
CHLORIDE BLD-SCNC: 107 MMOL/L (ref 98–107)
CO2: 23 MMOL/L (ref 20–31)
CREAT SERPL-MCNC: 0.68 MG/DL (ref 0.5–0.9)
GFR SERPL CREATININE-BSD FRML MDRD: >60 ML/MIN/1.73M2
GLUCOSE BLD-MCNC: 106 MG/DL (ref 70–99)
HCT VFR BLD CALC: 33.7 % (ref 36–46)
HEMOGLOBIN: 11.2 G/DL (ref 12–16)
MCH RBC QN AUTO: 29.3 PG (ref 26–34)
MCHC RBC AUTO-ENTMCNC: 33.1 G/DL (ref 31–37)
MCV RBC AUTO: 88.6 FL (ref 80–100)
PDW BLD-RTO: 15.1 % (ref 11.5–14.9)
PLATELET # BLD: 266 K/UL (ref 150–450)
PMV BLD AUTO: 7.9 FL (ref 6–12)
POTASSIUM SERPL-SCNC: 3.8 MMOL/L (ref 3.7–5.3)
RBC # BLD: 3.81 M/UL (ref 4–5.2)
SODIUM BLD-SCNC: 137 MMOL/L (ref 135–144)
WBC # BLD: 5.7 K/UL (ref 3.5–11)

## 2023-01-15 PROCEDURE — 6360000002 HC RX W HCPCS: Performed by: INTERNAL MEDICINE

## 2023-01-15 PROCEDURE — 36415 COLL VENOUS BLD VENIPUNCTURE: CPT

## 2023-01-15 PROCEDURE — 94640 AIRWAY INHALATION TREATMENT: CPT

## 2023-01-15 PROCEDURE — 80048 BASIC METABOLIC PNL TOTAL CA: CPT

## 2023-01-15 PROCEDURE — 85027 COMPLETE CBC AUTOMATED: CPT

## 2023-01-15 PROCEDURE — 6370000000 HC RX 637 (ALT 250 FOR IP): Performed by: NURSE PRACTITIONER

## 2023-01-15 PROCEDURE — 94761 N-INVAS EAR/PLS OXIMETRY MLT: CPT

## 2023-01-15 PROCEDURE — 6360000002 HC RX W HCPCS: Performed by: NURSE PRACTITIONER

## 2023-01-15 PROCEDURE — 6370000000 HC RX 637 (ALT 250 FOR IP): Performed by: STUDENT IN AN ORGANIZED HEALTH CARE EDUCATION/TRAINING PROGRAM

## 2023-01-15 PROCEDURE — 2580000003 HC RX 258: Performed by: NURSE PRACTITIONER

## 2023-01-15 PROCEDURE — 2580000003 HC RX 258: Performed by: INTERNAL MEDICINE

## 2023-01-15 RX ORDER — RISPERIDONE 0.5 MG/1
0.5 TABLET ORAL 2 TIMES DAILY
Qty: 60 TABLET | Refills: 3 | Status: SHIPPED | OUTPATIENT
Start: 2023-01-15

## 2023-01-15 RX ORDER — MIRTAZAPINE 7.5 MG/1
7.5 TABLET, FILM COATED ORAL NIGHTLY
Qty: 30 TABLET | Refills: 3 | Status: SHIPPED | OUTPATIENT
Start: 2023-01-15

## 2023-01-15 RX ORDER — AZITHROMYCIN 500 MG/1
500 TABLET, FILM COATED ORAL DAILY
Qty: 3 TABLET | Refills: 0 | Status: SHIPPED | OUTPATIENT
Start: 2023-01-16 | End: 2023-01-19

## 2023-01-15 RX ORDER — DEXLANSOPRAZOLE 60 MG/1
60 CAPSULE, DELAYED RELEASE ORAL NIGHTLY
Qty: 30 CAPSULE | Refills: 0 | Status: SHIPPED | OUTPATIENT
Start: 2023-01-15

## 2023-01-15 RX ORDER — CEFDINIR 300 MG/1
300 CAPSULE ORAL 2 TIMES DAILY
Qty: 6 CAPSULE | Refills: 0 | Status: SHIPPED | OUTPATIENT
Start: 2023-01-15 | End: 2023-01-18

## 2023-01-15 RX ORDER — IPRATROPIUM BROMIDE AND ALBUTEROL SULFATE 2.5; .5 MG/3ML; MG/3ML
3 SOLUTION RESPIRATORY (INHALATION) 3 TIMES DAILY
Qty: 270 ML | Refills: 2 | Status: SHIPPED | OUTPATIENT
Start: 2023-01-15 | End: 2023-04-15

## 2023-01-15 RX ORDER — BUDESONIDE AND FORMOTEROL FUMARATE DIHYDRATE 80; 4.5 UG/1; UG/1
2 AEROSOL RESPIRATORY (INHALATION) 2 TIMES DAILY
Qty: 10.2 G | Refills: 3 | Status: SHIPPED | OUTPATIENT
Start: 2023-01-15

## 2023-01-15 RX ADMIN — RISPERIDONE 0.5 MG: 0.5 TABLET ORAL at 08:12

## 2023-01-15 RX ADMIN — ENOXAPARIN SODIUM 40 MG: 100 INJECTION SUBCUTANEOUS at 08:11

## 2023-01-15 RX ADMIN — SODIUM CHLORIDE, PRESERVATIVE FREE 10 ML: 5 INJECTION INTRAVENOUS at 08:14

## 2023-01-15 RX ADMIN — CEFDINIR 300 MG: 300 CAPSULE ORAL at 11:03

## 2023-01-15 RX ADMIN — BUDESONIDE AND FORMOTEROL FUMARATE DIHYDRATE 2 PUFF: 80; 4.5 AEROSOL RESPIRATORY (INHALATION) at 10:35

## 2023-01-15 RX ADMIN — IPRATROPIUM BROMIDE AND ALBUTEROL SULFATE 1 AMPULE: 2.5; .5 SOLUTION RESPIRATORY (INHALATION) at 15:41

## 2023-01-15 RX ADMIN — AZITHROMYCIN MONOHYDRATE 500 MG: 250 TABLET ORAL at 08:11

## 2023-01-15 RX ADMIN — IPRATROPIUM BROMIDE AND ALBUTEROL SULFATE 1 AMPULE: 2.5; .5 SOLUTION RESPIRATORY (INHALATION) at 10:35

## 2023-01-15 RX ADMIN — CEFTRIAXONE SODIUM 1000 MG: 1 INJECTION, POWDER, FOR SOLUTION INTRAMUSCULAR; INTRAVENOUS at 16:25

## 2023-01-15 ASSESSMENT — ENCOUNTER SYMPTOMS
CHOKING: 1
CONSTIPATION: 0
ABDOMINAL PAIN: 0
BACK PAIN: 0
NAUSEA: 0
COUGH: 1
DIARRHEA: 0
SHORTNESS OF BREATH: 1

## 2023-01-15 NOTE — PROGRESS NOTES
Per Dr. Kiya Lancaster, patient is ok for discharge. Nebulizer ordered as well as treatments. Follow up in 2-4 weeks.

## 2023-01-15 NOTE — PROGRESS NOTES
Department of Psychiatry  Consult Service  Progress note        REASON FOR CONSULT: Suicidal ideation; history of substance use    CONSULTING PHYSICIAN: PHILIP Lewis CNP    History obtained from: Patient    Interim history  -The patient states that her current combination of psychotropic medication is working well. She denies suicidal ideation. She denied auditory or visual hallucinations. Her mood is improving. Her anxiety is well controlled. She is sleeping well at night. She is marcos for safety upon discharge. No changes have been made to her medications    HISTORY OF PRESENT ILLNESS:          The patient is a 38 y.o. female with significant psychiatric history of bipolar disorder, generalized anxiety disorder with panic attacks, and cocaine use disorder who is admitted medically for pneumonia. She was seen at bedside with one-to-one safety sitter present. Patient was cooperative and agreeable to conversation. She presented as moderately anxious with loud, pressured speech and was hyperverbal. She was intermittently tearful and is currently grieving the death of her boyfriend, who  by accidental fentanyl-laced cocaine on 23. Patient admits that she initially made suicidal statements and expressed having little to live for after his death. She retold story of observing boyfriend using the cocaine on the table and then asking patient to clean it up. Patient recalls cleaning up the table and then the next thing she knew she was waking up in the ED. She tested positive for cocaine and fentanyl in ED on 2023, but adamantly denies purposeful ingestion. She believes she inhaled it while cleaning. She does have a history of cocaine use disorder, but is denying any recent use. She denies regular alcohol intake. Patient is reporting today that she is no longer having suicidal thoughts.   She expresses that these thoughts were stated in an intense moment of grief and she has no plan or intention to end her life. She acknowledges having a supportive group of friends and family around her. When she is discharged back to her home her stepmother will be staying with her for an extended period of time along with her live-in friend Aquilino Abreu. She also reports having an intake appointment scheduled with [facility] on 2/11/2023. She is known to [facility] and was last admitted in August 2021. Patient endorses a history of mental illness with past diagnosis of bipolar disorder. She describes manic episodes, which include rapid thoughts and speech, little to no sleep for more than 3 days, and increase in goal-directed activity, impulsivity, distractibility, irritability, and impaired judgment. She feels her last symptoms of viki occurred at the end of last month and lasted for about 4 days. Patient states she is on disability benefits due to bipolar disorder history. Patient is also reporting generalized anxiety disorder with panic attacks and social anxiety with agoraphobia. She shares she has little desire to leave her home and will do so only if it is absolutely necessary. She describes obsessive cleaning and organizing as a way to manage symptoms of anxiety. She endorses some possible OCD traits and reports feeling compelled to clean out the cat litter box 5 times a day and she is not happy until this is done. She feels that at times the need and compulsive desire to clean and organize does have a negative effect on her life. Patient has been prescribed psychotropic medications in the past and reports that Risperdal has been the most effective in helping to control her symptoms of viki. She is not currently taking any psychiatric medications but is open to restarting. She is currently reporting distressing, racing thoughts and poor sleep with difficulty falling asleep and staying asleep. Patient is denying any significant symptoms of depression at this time.  Current presentation is indicative of active grieving process. Patient identifies several protective factors in her life and identifies her son needing her for a court date on this coming Wednesday. The patient is not currently receiving care for the above psychiatric illness. Intake appointment at [facility] scheduled 2023    Past Psychiatric History:  Prior Diagnosis: Bipolar disorder, DINORA with panic attacks, agoraphobia  Outpatient psychiatric provider: Recently linked with [facility]; appointment 2023  Psychiatric Hospitalization: yes  Hx of Suicidal Attempts: yes;    Hx of violence:  no    Past psychiatric medications includes:   Risperdal, Seroquel, Invega, Depakote, BuSpar, trazodone    Adverse reactions from psychotropic medications:  None    Substance Abuse History:  ETOH: Current alcohol usage:  Type of Drink(s): Various. Frequency of use:  Rarely. Duration of alcohol use:  20+ years.   Approximate date of last drink: Cannot recall  Marijuana: Regular use  Opiates: Denies  Other Drugs: History of cocaine use; denies current use    Social History:     RESIDENCE: Shared a home with boyfriend and friend; can return to this home  : Single; had significant other, Vicki Rodriguez,  2023    CHILDREN: 3; adults  OCCUPATION: Receives SSDI benefits  EDUCATION: [employer] degree    Past Medical History:        Diagnosis Date    Agoraphobia     Anxiety     sees Dr. Nehemias Rocha at [facility]    Depression     Diarrhea     Drug abuse, marijuana     Dysmenorrhea     Endometriosis 2013    new dx    Fibroid     GERD (gastroesophageal reflux disease)     History of nipple discharge     Hyperlipidemia     Hypertension     no longer on meds-anxiety induced HTN    Hypothyroidism     Irritable bowel syndrome     LGSIL (low grade squamous intraepithelial dysplasia) 10/11    Menorrhagia     Midline low back pain without sciatica 2016    Mood swings     Ovarian cyst     Pelvic adhesions     Pelvic pain     Prolactin increased ,  Rectal bleeding     Type II or unspecified type diabetes mellitus without mention of complication, not stated as uncontrolled     Dr. Gabriella Bower    Vocal cord polyps     Vomiting     Wellness examination     Dr. Pretty Ring last seen 2020       Past Surgical History:        Procedure Laterality Date    ABDOMINAL ADHESION SURGERY  13    CARPAL TUNNEL RELEASE Right 2020    CARPAL TUNNEL RELEASE Right 2020    CARPAL TUNNEL RELEASE (SUPINE) 3080 TABLE, ARM TABLE performed by Gerard Catalan DO at [place] [id number].  14    Benign biopsies    COLONOSCOPY  7-14-15    incomplete/poor prep, hemorrhoids. ENTEROCELE REPAIR  13    ESOPHAGOSCOPY  1/22/15    HYSTERECTOMY (CERVIX STATUS UNKNOWN)  13    HETAL, Right Salpingectomy, Enterocele, SHON    LAPAROSCOPY  2013    Diagnostic converted to [address]       due to cyst- right    TUBAL LIGATION      UPPER GASTROINTESTINAL ENDOSCOPY      WISDOM TOOTH EXTRACTION      x4       Family Medical and Psychiatric History: Mother: Bipolar disorder, PTSD  No familial suicides  History of substance use disorders in family        Problem Relation Age of Onset    Kidney Disease Mother     Diabetes Mother     Heart Disease Mother     Lung Cancer Maternal Grandmother     Diabetes Maternal Grandmother     Lung Cancer Maternal Grandfather     Diabetes Maternal Grandfather     Breast Cancer Neg Hx     Cancer Neg Hx     Colon Cancer Neg Hx     Eclampsia Neg Hx     Hypertension Neg Hx     Ovarian Cancer Neg Hx      Labor Neg Hx     Spont Abortions Neg Hx     Stroke Neg Hx        Medications Prior to Admission:   No medications prior to admission.     Allergies:  Augmentin [amoxicillin-pot clavulanate], Codeine, Chantix [varenicline tartrate], and Magnesium citrate    Lifetime Psychiatric Review of Systems        Obsessions and Compulsions: Endorses     Aretha or Hypomania: Endorses     Hallucinations: Endorses visions of her mother after her mother's death in 2008     Panic Attacks: Endorses     Delusions: Denies     Phobias: Denies     Trauma: Endorses history of abuse; declines to discuss this consult    Prior to Admission medications    Medication Sig Start Date End Date Taking? Authorizing Provider   azithromycin (ZITHROMAX) 500 MG tablet Take 1 tablet by mouth daily for 3 doses 1/16/23 1/19/23 Yes Luis Keita MD   budesonide-formoterol (SYMBICORT) 80-4.5 MCG/ACT AERO Inhale 2 puffs into the lungs in the morning and 2 puffs in the evening. 1/15/23  Yes Luis Keita MD   dexlansoprazole (DEXILANT) 60 MG CPDR delayed release capsule Take 1 capsule by mouth at bedtime 1/15/23  Yes Luis Keita MD   Benzocaine-Menthol (SORE THROAT LOZENGES) 6-10 MG LOZG lozenge Take 1 lozenge by mouth every 2 hours as needed for Sore Throat 1/15/23  Yes Luis Keita MD   mirtazapine (REMERON) 7.5 MG tablet Take 1 tablet by mouth nightly 1/15/23  Yes Luis Keita MD   risperiDONE (RISPERDAL) 0.5 MG tablet Take 1 tablet by mouth 2 times daily 1/15/23  Yes Luis Keita MD   cefdinir (OMNICEF) 300 MG capsule Take 1 capsule by mouth 2 times daily for 3 days 1/15/23 1/18/23 Yes Luis Keita MD   ipratropium-albuterol (DUONEB) 0.5-2.5 (3) MG/3ML SOLN nebulizer solution Inhale 3 mLs into the lungs 3 times daily 1/15/23 4/15/23 Yes Hira Mena MD   VENTOLIN  (90 Base) MCG/ACT inhaler INHALE 2 PUFFS INTO THE LUNGS EVERY 4 HOURS AS NEEDED FOR WHEEZING 12/6/22   Grace Marquez DO        Medications:    Current Facility-Administered Medications: cefTRIAXone (ROCEPHIN) 1,000 mg in sodium chloride 0.9 % 50 mL IVPB mini-bag, 1,000 mg, IntraVENous, Q24H  dicyclomine (BENTYL) capsule 10 mg, 10 mg, Oral, 4x Daily PRN  Benzocaine-Menthol (CEPACOL) 1 lozenge, 1 lozenge, Oral, Q2H PRN  budesonide-formoterol (SYMBICORT) 80-4.5 MCG/ACT inhaler 2 puff, 2 puff, Inhalation, BID  dexlansoprazole (DEXILANT) delayed  release capsule 60 mg, 60 mg, Oral, Nightly  metoclopramide (REGLAN) tablet 5 mg, 5 mg, Oral, 4x Daily AC & HS  albuterol sulfate HFA (PROVENTIL;VENTOLIN;PROAIR) 108 (90 Base) MCG/ACT inhaler 2 puff, 2 puff, Inhalation, Q4H PRN  cyclobenzaprine (FLEXERIL) tablet 5 mg, 5 mg, Oral, TID PRN  azithromycin (ZITHROMAX) tablet 500 mg, 500 mg, Oral, Daily  risperiDONE (RISPERDAL) tablet 0.5 mg, 0.5 mg, Oral, BID  mirtazapine (REMERON) tablet 7.5 mg, 7.5 mg, Oral, Nightly  sodium chloride flush 0.9 % injection 5-40 mL, 5-40 mL, IntraVENous, 2 times per day  sodium chloride flush 0.9 % injection 10 mL, 10 mL, IntraVENous, PRN  0.9 % sodium chloride infusion, , IntraVENous, PRN  enoxaparin (LOVENOX) injection 40 mg, 40 mg, SubCUTAneous, Daily  ondansetron (ZOFRAN-ODT) disintegrating tablet 4 mg, 4 mg, Oral, Q8H PRN **OR** ondansetron (ZOFRAN) injection 4 mg, 4 mg, IntraVENous, Q6H PRN  magnesium hydroxide (MILK OF MAGNESIA) 400 MG/5ML suspension 30 mL, 30 mL, Oral, Daily PRN  acetaminophen (TYLENOL) tablet 650 mg, 650 mg, Oral, Q6H PRN **OR** acetaminophen (TYLENOL) suppository 650 mg, 650 mg, Rectal, Q6H PRN  albuterol (PROVENTIL) nebulizer solution 2.5 mg, 2.5 mg, Nebulization, Q2H PRN  ipratropium-albuterol (DUONEB) nebulizer solution 1 ampule, 1 ampule, Inhalation, Q4H WA     Physical:    Vitals:  /71   Pulse 74   Temp 98.8 °F (37.1 °C) (Oral)   Resp 16   LMP 05/30/2013   SpO2 99%      Neuro Exam:   Muscle Strength & Tone: full ROM    Involuntary Movements: No    Mental Status Examination:  Level of consciousness:  Awake and alert  Appearance: hospital attire, sitting up in bed, fair grooming and fair hygiene  Behavior/Motor: No psychomotor abnormalities  Attitude toward examiner:  cooperative, attentive and good eye contact  Speech:  Spontaneous, with normal rate and volume   mood: Euthymic  Affect: mood congruent  Thought processes:  Linear, goal directed, coherent  Thought content: Denies suicidal ideations   Denies homicidal ideations    Denies hallucinations   Denies delusions  Cognition:  Oriented to self, situation, location, date  Concentration: Clinically adequate   memory age appropriate  Insight & Judgment: fair    DSM-5 DIAGNOSIS:    Acute stress reaction  Pneumonia    History of DINORA with panic attacks; bipolar disorder; agoraphobia    Stressors     Severity of stressors is moderate  Source of stressors include: Death of loved one    Plan:       Okay to discharge to home once medically stable  Continue Risperdal 0.5 mg by mouth 2 times a day  Continue Remeron 7.5 mg by mouth nightly  No changes to medications    Additional recommendations will follow the clinical course. Thank you very much for allowing us to participate in the care of this patient. Electronically signed by Mickey Melgar MD on 1/15/2023 at 6:01 PM      Please note that this chart was generated using voice recognition Dragon dictation software. Although every effort was made to ensure the accuracy of this automated transcription, some errors in transcription may have occurred.

## 2023-01-15 NOTE — PROGRESS NOTES
Pt alert, oriented and medically stable for discharge. Pt verbalized understanding of new medications, side effects of those new medications, follow up appointments. Pt iv removed and all belongings including cell phone, clothing and cigarettes/lighter discharged with patient as well as paper script for breathing treatments. Pt wheeled down to main entrance where her family friend is taking her home.

## 2023-01-15 NOTE — DISCHARGE INSTR - DIET
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
## 2023-01-15 NOTE — PROGRESS NOTES
Pulmonary Progress Note  [facility]      Patient - Cassidy Vital,  Age - 38 y.o.    - 1980      Room Number - 01   N -  [id number]   [facility]  Date of Admission -  2023  5:57 PM        Consulting Jessica Driver MD  Primary Care Physician - Mahnaz Tee,      SUBJECTIVE   Patient is in good spirits. Resting quietly. She claims she feels much better. O2 saturations 99 to 97% on room air. She wants to go home. She feels like she can take care of things at home    OBJECTIVE   VITALS    oral temperature is 98.8 °F (37.1 °C). Her blood pressure is 136/71 and her pulse is 74. Her respiration is 16 and oxygen saturation is 99%. There is no height or weight on file to calculate BMI. Temperature Range: Temp: 98.8 °F (37.1 °C) Temp  Av.6 °F (37 °C)  Min: 98.4 °F (36.9 °C)  Max: 98.8 °F (37.1 °C)  BP Range:  Systolic (69HJG), PKC:153 , Min:136 , OLS:557     Diastolic (46HIY), BOJ:76, Min:71, Max:76    Pulse Range: Pulse  Av.7  Min: 55  Max: 74  Respiration Range: Resp  Av.3  Min: 16  Max: 17  Current Pulse Ox[de-identified]  SpO2: 99 %  24HR Pulse Ox Range:  SpO2  Av.8 %  Min: 91 %  Max: 99 %  Oxygen Amount and Delivery: Wt Readings from Last 3 Encounters:   23 166 lb 0.1 oz (75.3 kg)   22 166 lb 1.6 oz (75.3 kg)   22 163 lb (73.9 kg)       I/O (24 Hours)    Intake/Output Summary (Last 24 hours) at 1/15/2023 1610  Last data filed at 1/15/2023 1435  Gross per 24 hour   Intake 1260 ml   Output --   Net 1260 ml       EXAM     General Appearance  Awake, alert, oriented, in no acute distress  HEENT - normocephalic, atraumatic. Neck - Supple,  trachea midline   Lungs -coarse breath sounds no crackles rales  Heart Exam:PMI normal. No lifts, heaves, or thrills. RRR. No murmurs, clicks, gallops, or rubs  Abdomen Exam: Abdomen soft, non-tender.    Extremity Exam: No signs of cyanosis    MEDS cefTRIAXone (ROCEPHIN) IV  1,000 mg IntraVENous Q24H    budesonide-formoterol  2 puff Inhalation BID    dexlansoprazole  60 mg Oral Nightly    metoclopramide  5 mg Oral 4x Daily AC & HS    azithromycin  500 mg Oral Daily    risperiDONE  0.5 mg Oral BID    mirtazapine  7.5 mg Oral Nightly    sodium chloride flush  5-40 mL IntraVENous 2 times per day    enoxaparin  40 mg SubCUTAneous Daily    ipratropium-albuterol  1 ampule Inhalation Q4H WA      sodium chloride       dicyclomine, Benzocaine-Menthol, albuterol sulfate HFA, cyclobenzaprine, sodium chloride flush, sodium chloride, ondansetron **OR** ondansetron, magnesium hydroxide, acetaminophen **OR** acetaminophen, albuterol    LABS   CBC   Recent Labs     01/15/23  0557   WBC 5.7   HGB 11.2*   HCT 33.7*   MCV 88.6        BMP:   Lab Results   Component Value Date/Time     01/15/2023 05:57 AM    K 3.8 01/15/2023 05:57 AM     01/15/2023 05:57 AM    CO2 23 01/15/2023 05:57 AM    BUN 9 01/15/2023 05:57 AM    LABALBU 3.4 01/13/2023 06:30 PM    LABALBU 4.1 04/04/2012 05:34 PM    CREATININE 0.68 01/15/2023 05:57 AM    CALCIUM 8.5 01/15/2023 05:57 AM    GFRAA >60 07/05/2022 11:33 PM    LABGLOM >60 01/15/2023 05:57 AM     ABGs:No results found for: PHART, PO2ART, PPR6VZZ No results found for: IFIO2, MODE, SETTIDVOL, SETPEEP  Ionized Calcium:  No results found for: IONCA  Magnesium:    Lab Results   Component Value Date/Time    MG 1.8 01/10/2023 07:49 AM     Phosphorus:    Lab Results   Component Value Date/Time    PHOS 4.0 08/01/2013 10:13 AM        LIVER PROFILE   Recent Labs     01/13/23  1830   AST 33*   ALT 55*   LIPASE 159*   BILITOT 0.3   ALKPHOS 76     INR No results for input(s): INR in the last 72 hours.   PTT   Lab Results   Component Value Date    APTT 28.5 09/17/2013         RADIOLOGY     (See actual reports for details)    ASSESSMENT/PLAN     Patient Active Problem List   Diagnosis    Hypercholesteremia    Panic anxiety syndrome    DM (diabetes mellitus) (HCC)    IBS (irritable bowel syndrome)    Mood swings    Hypothyroidism    Constipation    Endometriosis    Agoraphobia    HETAL RS with enterocele and Ovarian Preservation 9/23/13    Left ankle pain    Diarrhea    Vomiting    Rectal bleeding    Midline low back pain without sciatica    Irritable bowel syndrome without diarrhea    Gastroesophageal reflux disease     Abdominal pain, generalized    Carpal tunnel syndrome on right    Major depression with psychotic features ([address]    Bipolar disorder with psychotic features ([address]    Cocaine use    Hx of diabetes mellitus    Opiate overdose ([address]    Acute respiratory failure with hypoxia (HCC)    Elevated troponin    Rhabdomyolysis    FUNMILAYO (acute kidney injury) ([address]    Hyperkalemia    Esophageal dilatation    Metabolic acidosis    Polysubstance abuse ([address]    Arterial hypotension    Disorientation    CAP (community acquired pneumonia)    Pneumonia of left lung due to infectious organism    Interstitial pneumonia of both lungs ([address]     Impression  #1 multilobar process. Likely infectious versus noninfectious inflammatory process. Possibly aspiration  #2 recent hospitalization for acute mental status. Talk screen positive. Patient was treated on Zosyn for pneumonia. #3.  Hospitalization for rhabdomyolysis, recent  #4 hospitalization for FUNMILAYO, recently, renal function now normal  #5 Tox screen positive for THC and fentanyl  #6. Tobacco history  #7. Suicidal ideation, history of depression anxiety sees a physician at [facility]  #8 history of GERD  #9 history of irritable bowel syndrome  #10 history of hypothyroidism  #11 history of type 2 diabetes  #12, clinical diagnosis of COPD. Patient has a significant tobacco history. History of wheezing  #13, right lower lobe pulmonary nodule.   Clear if this is inflammatory infectious or neoplastic at this time    #13  full code    Internal medicine service will be discharging patient on [address] and Zithromax. This is after 1 dose of Rocephin. Seems reasonable. I suspect that she has moderate COPD. She voiced understanding that smoking cessation will improve her quality of life and likely extended. Face-to-face discussion was done for her to use her nebulizer machine for DuoNeb treatments for COPD. She voiced understanding and is more than okay to take the medication as she feels it helps her greatly. She was understand to call or go back to the ED if she feels worse. We would like to see her again in 2 to 3 weeks with lung function test.    We will likely need to repeat a CT scan in 4 to 6 weeks without IV contrast    Patient happy to be going home.     Conferred with bedside nurse as well as Irving Collier, don [employer] address  [address]  [phone]      Electronically signed by Ye Llamas MD on 1/15/2023 at 4:10 PM
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
## 2023-01-15 NOTE — CARE COORDINATION
CASE MANAGEMENT NOTE:    Admission Date:  1/13/2023 Abhishek Busch is a 38 y.o.  female    Admitted for : Pneumonia of left lung due to infectious organism, unspecified part of lung [J18.9]  CAP (community acquired pneumonia) due to Chlamydia species [J16.0]    Met with:  Patient    PCP:  Dr. Carly Fields:  [facility]      Is patient alert and oriented at time of discussion:  Yes    Current Residence/ Living Arrangements:  independently at home             Current Services PTA:  No    Does patient go to outpatient dialysis: No  If yes, location and chair time:   Who is their nephrologist?     Is patient agreeable to VNS: No    Freedom of choice provided:  No    List of [address] provided: No    VNS chosen:  No    DME:  none    Home Oxygen: No    Nebulizer: No    CPAP/BIPAP: No    Supplier: N/A    Potential Assistance Needed: No    SNF needed: No    Freedom of choice and list provided: No    Pharmacy:  [facility]       Is patient currently receiving oral anticoagulation therapy? No    Is the Patient an [facility] [place] with Readmission Risk Score greater than 14%? No  If yes, pt needs a follow up appointment made within 7 days. Family Members/Caregivers that are willing and able to care for patient at home:    Yes    If yes, list name here:  Sandhya    Family/caregivers educated on resources available including DME and respite care: NA    Transportation Provider:  Family             Discharge Plan:  1/15/23 [facility] Pt is from home in a three story home with her roommates DME none VNS denies Plan is to discharge to home with no needs will continue to follow .//tv                 Electronically signed by:  Mikaela Fowler RN on 1/15/2023 at 10:54 AM

## 2023-01-17 NOTE — DISCHARGE INSTR - COC
Continuity of Care Form    Patient Name: Miranda Guadalupe   :  1980  MRN:  [id number]    Admit date:  2023  Discharge date:  ***    Code Status Order: Prior   Advance Directives:     Admitting Physician:  Yumiko Lowe MD  PCP: Cande Maria DO    Discharging Nurse: [facility] Unit/Room#:   Discharging Unit Phone Number: ***    Emergency Contact:   Extended Emergency Contact Information  Primary Emergency Contact: LIVIER Stinson Phone: [phone]  Mobile Phone: [phone]  Relation: Parent   needed? No  Secondary Emergency Contact: Dick Rousseau Phone: [phone]  Relation: Other    Past Surgical History:  Past Surgical History:   Procedure Laterality Date    ABDOMINAL ADHESION SURGERY  13    CARPAL TUNNEL RELEASE Right 2020    CARPAL TUNNEL RELEASE Right 2020    CARPAL TUNNEL RELEASE (SUPINE) 3080 TABLE, ARM TABLE performed by Makayla Huber DO at [facility]      COLONOSCOPY  14    Benign biopsies    COLONOSCOPY  7-14-15    incomplete/poor prep, hemorrhoids. ENTEROCELE REPAIR  13    ESOPHAGOSCOPY  1/22/15    HYSTERECTOMY (CERVIX STATUS UNKNOWN)  13    HETAL, Right Salpingectomy, Enterocele, SHON    LAPAROSCOPY  2013    Diagnostic converted to [address]       due to cyst- right    TUBAL LIGATION      UPPER GASTROINTESTINAL ENDOSCOPY      WISDOM TOOTH EXTRACTION      x4       Immunization History: There is no immunization history on file for this patient.     Active Problems:  Patient Active Problem List   Diagnosis Code    Hypercholesteremia E78.00    Panic anxiety syndrome F41.0    DM (diabetes mellitus) ([facility] [address] E11.9    IBS (irritable bowel syndrome) K58.9    Mood swings R45.86    Hypothyroidism E03.9    Constipation K59.00    Endometriosis N80.9    Agoraphobia F40.00    HETAL RS with enterocele and Ovarian Preservation 13 Z90.710    Left ankle pain M25.572 Diarrhea R19.7    Vomiting R11.10    Rectal bleeding K62.5    Midline low back pain without sciatica M54.50    Irritable bowel syndrome without diarrhea K58.9    Gastroesophageal reflux disease  K21.9    Abdominal pain, generalized R10.84    Carpal tunnel syndrome on right G56.01    Major depression with psychotic features ([facility]) F32.3    Bipolar disorder with psychotic features ([facility]) F31.9    Cocaine use F14.90    Hx of diabetes mellitus Z86.39    Opiate overdose ([facility]) T40.601A    Acute respiratory failure with hypoxia ([facility]) J96.01    Elevated troponin R77.8    Rhabdomyolysis M62.82    FUNMILAYO (acute kidney injury) ([facility]) N17.9    Hyperkalemia E87.5    Esophageal dilatation Y37.56    Metabolic acidosis A02.12    Polysubstance abuse ([facility]) F19.10    Arterial hypotension I95.9    Disorientation R41.0    CAP (community acquired pneumonia) J18.9    Pneumonia of left lung due to infectious organism J18.9    Interstitial pneumonia of both lungs ([facility]) J84.9       Isolation/Infection:   Isolation            No Isolation          Patient Infection Status       Infection Onset Added Last Indicated Last Indicated By Review Planned Expiration Resolved Resolved By    None active    Resolved    COVID-19 (Rule Out) 01/13/23 01/13/23 01/13/23 COVID-19 & Influenza Combo (Ordered)   01/13/23 Rule-Out Test Resulted    COVID-19 (Rule Out) 09/25/22 09/25/22 09/25/22 COVID-19, Rapid (Ordered)   09/25/22 Rule-Out Test Resulted    COVID-19 (Rule Out) 06/20/20 06/20/20 06/20/20 COVID-19 (Ordered)   06/22/20 Rule-Out Test Resulted            Nurse Assessment:  Last Vital Signs: /71   Pulse 74   Temp 98.8 °F (37.1 °C) (Oral)   Resp 16   LMP 05/30/2013   SpO2 99%     Last documented pain score (0-10 scale): Pain Level: 10  Last Weight:   Wt Readings from Last 1 Encounters:   01/09/23 166 lb 0.1 oz (75.3 kg)     Mental Status:  {IP PT MENTAL STATUS:82428}    IV Access:  {[facility] ACCESS:659218478}    Nursing Mobility/ADLs:  Walking   {[facility] DME HFJR:324946474}  Transfer  {[facility] DME HEJJ:895456643}  Bathing  {[facility] DME TBWU:063024866}  Dressing  {[facility] DME GQFU:394863396}  Toileting  {[facility] DME HKWZ:510316293}  Feeding  {[facility] DME XZC}  Med Admin  {[facility] DME RFRS:795722305}  Med Delivery   { AARON MED Delivery:732160329}    Wound Care Documentation and Therapy:  Incision 20 Wrist Right (Active)   Number of days: 937        Elimination:  Continence: Bowel: {YES / BH:07988}  Bladder: {YES / VV:08796}  Urinary Catheter: {Urinary Catheter:699626777}   Colostomy/Ileostomy/Ileal Conduit: {YES / FM:07315}       Date of Last BM: ***  No intake or output data in the 24 hours ending 23 1241  I/O last 3 completed shifts:   In: 80 [P.O.:780; I.V.:787]  Out: -     Safety Concerns:     [employer] Safety Concerns:133757816}    Impairments/Disabilities:      [employer] Impairments/Disabilities:238960198}    Nutrition Therapy:  Current Nutrition Therapy:   [employer] Diet List:869638601}    Routes of Feeding: {[facility] DME Other Feedings:654858243}  Liquids: {Slp liquid thickness:71850}  Daily Fluid Restriction: {[facility] DME Yes amt example:231603173}  Last Modified Barium Swallow with Video (Video Swallowing Test): {Done Not Done KAYLAH:660254727}    Treatments at the Time of Hospital Discharge:   Respiratory Treatments: ***  Oxygen Therapy:  {Therapy; copd oxygen:43655}  Ventilator:    { CC Vent LIQW:550264290}    Rehab Therapies: {THERAPEUTIC INTERVENTION:1924654157}  Weight Bearing Status/Restrictions: [employer] Weight Bearin}  Other Medical Equipment (for information only, NOT a DME order):  {EQUIPMENT:929971422}  Other Treatments: ***    Patient's personal belongings (please select all that are sent with patient):  {[facility] DME Belongings:350257243}    RN SIGNATURE:  {Esignature:459240618}    CASE MANAGEMENT/SOCIAL WORK SECTION    Inpatient Status Date: ***    Readmission Risk Assessment Score:  Readmission Risk              Risk of Unplanned Readmission:  0 Discharging to Facility/ Agency   Name:   Address:  Phone:  Fax:    Dialysis Facility (if applicable)   Name:  Address:  Dialysis Schedule:  Phone:  Fax:    / signature: {Esignature:390462102}    PHYSICIAN SECTION    Prognosis: {Prognosis:1805146755}    Condition at Discharge: Heather Snell Patient Condition:350478622}    Rehab Potential (if transferring to Rehab): {Prognosis:4014352255}    Recommended Labs or Other Treatments After Discharge: ***    Physician Certification: I certify the above information and transfer of Libia Palomino  is necessary for the continuing treatment of the diagnosis listed and that she requires {Admit to Appropriate Level of Care:81197} for {GREATER/LESS:575984197} 30 days.      Update Admission H&P: {[facility] DME Changes in IPCTA:234655065}    PHYSICIAN SIGNATURE:  {Esignature:434688149}

## 2023-01-17 NOTE — CARE COORDINATION
Writer received call from Pt. That she did NOT receive her Nebulizer. [facility], does NOT accept her Insurance. Writer re-routed, information to [facility], Notified, Cortney Carlos, to follow. She can be reached at [phone].

## 2023-01-18 LAB
EKG ATRIAL RATE: 71 BPM
EKG P AXIS: 54 DEGREES
EKG P-R INTERVAL: 150 MS
EKG Q-T INTERVAL: 368 MS
EKG QRS DURATION: 92 MS
EKG QTC CALCULATION (BAZETT): 399 MS
EKG R AXIS: 30 DEGREES
EKG T AXIS: 26 DEGREES
EKG VENTRICULAR RATE: 71 BPM
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
## 2023-02-06 PROBLEM — R77.8 ELEVATED TROPONIN: Status: RESOLVED | Noted: 2023-01-07 | Resolved: 2023-02-06

## 2023-02-06 PROBLEM — R79.89 ELEVATED TROPONIN: Status: RESOLVED | Noted: 2023-01-07 | Resolved: 2023-02-06

## 2023-05-03 RX ORDER — MIRTAZAPINE 7.5 MG/1
7.5 TABLET, FILM COATED ORAL NIGHTLY
Qty: 30 TABLET | Refills: 3 | OUTPATIENT
Start: 2023-05-03

## 2023-05-03 RX ORDER — DILTIAZEM HYDROCHLORIDE 60 MG/1
TABLET, FILM COATED ORAL
Qty: 10.2 G | Refills: 3 | OUTPATIENT
Start: 2023-05-03

## 2023-06-28 RX ORDER — ALBUTEROL SULFATE 90 UG/1
AEROSOL, METERED RESPIRATORY (INHALATION)
Qty: 18 G | Refills: 3 | OUTPATIENT
Start: 2023-06-28

## 2023-08-10 PROBLEM — M62.82 RHABDOMYOLYSIS: Status: RESOLVED | Noted: 2023-01-07 | Resolved: 2023-08-10

## 2023-08-10 PROBLEM — E87.20 METABOLIC ACIDOSIS: Status: RESOLVED | Noted: 2023-01-08 | Resolved: 2023-08-10

## 2023-08-10 PROBLEM — I95.9 ARTERIAL HYPOTENSION: Status: RESOLVED | Noted: 2023-01-08 | Resolved: 2023-08-10

## 2023-08-10 PROBLEM — N17.9 AKI (ACUTE KIDNEY INJURY) (HCC): Status: RESOLVED | Noted: 2023-01-07 | Resolved: 2023-08-10

## 2023-08-10 PROBLEM — J96.01 ACUTE RESPIRATORY FAILURE WITH HYPOXIA (HCC): Status: RESOLVED | Noted: 2023-01-07 | Resolved: 2023-08-10

## 2023-08-10 PROBLEM — J18.9 CAP (COMMUNITY ACQUIRED PNEUMONIA): Status: RESOLVED | Noted: 2023-01-13 | Resolved: 2023-08-10

## 2023-08-10 PROBLEM — J18.9 PNEUMONIA OF LEFT LUNG DUE TO INFECTIOUS ORGANISM: Status: RESOLVED | Noted: 2023-01-14 | Resolved: 2023-08-10

## 2023-08-10 PROBLEM — E87.5 HYPERKALEMIA: Status: RESOLVED | Noted: 2023-01-08 | Resolved: 2023-08-10

## 2023-08-10 PROBLEM — R41.0 DISORIENTATION: Status: RESOLVED | Noted: 2023-01-10 | Resolved: 2023-08-10

## 2023-08-10 PROBLEM — J84.9 INTERSTITIAL PNEUMONIA OF BOTH LUNGS (HCC): Status: RESOLVED | Noted: 2023-01-15 | Resolved: 2023-08-10

## 2023-08-14 ENCOUNTER — HOSPITAL ENCOUNTER (OUTPATIENT)
Dept: CT IMAGING | Age: 43
Discharge: HOME | End: 2023-08-16
Payer: MEDICAID

## 2023-08-14 DIAGNOSIS — J18.9 PNEUMONITIS: ICD-10-CM

## 2023-08-14 PROCEDURE — 71250 CT THORAX DX C-: CPT

## 2023-08-14 RX ORDER — RISPERIDONE 0.5 MG/1
0.5 TABLET ORAL 2 TIMES DAILY
Qty: 60 TABLET | Refills: 3 | OUTPATIENT
Start: 2023-08-14

## 2023-09-07 ENCOUNTER — OFFICE VISIT (OUTPATIENT)
Dept: INTERNAL MEDICINE | Age: 43
End: 2023-09-07
Payer: MEDICAID

## 2023-09-07 VITALS
HEART RATE: 78 BPM | HEIGHT: 62 IN | DIASTOLIC BLOOD PRESSURE: 87 MMHG | BODY MASS INDEX: 31.32 KG/M2 | OXYGEN SATURATION: 96 % | WEIGHT: 170.2 LBS | SYSTOLIC BLOOD PRESSURE: 121 MMHG | TEMPERATURE: 98.1 F

## 2023-09-07 DIAGNOSIS — Z72.0 TOBACCO ABUSE: Primary | ICD-10-CM

## 2023-09-07 DIAGNOSIS — F31.9 BIPOLAR DISORDER WITH PSYCHOTIC FEATURES (HCC): ICD-10-CM

## 2023-09-07 DIAGNOSIS — Z86.39 HX OF DIABETES MELLITUS: ICD-10-CM

## 2023-09-07 DIAGNOSIS — Z11.4 SCREENING FOR HIV (HUMAN IMMUNODEFICIENCY VIRUS): ICD-10-CM

## 2023-09-07 DIAGNOSIS — K21.9 GASTROESOPHAGEAL REFLUX DISEASE WITHOUT ESOPHAGITIS: ICD-10-CM

## 2023-09-07 DIAGNOSIS — G56.02 CARPAL TUNNEL SYNDROME OF LEFT WRIST: ICD-10-CM

## 2023-09-07 PROCEDURE — 99211 OFF/OP EST MAY X REQ PHY/QHP: CPT | Performed by: HOSPITALIST

## 2023-09-07 RX ORDER — RISPERIDONE 0.5 MG/1
0.5 TABLET ORAL NIGHTLY
Qty: 30 TABLET | Refills: 1 | Status: SHIPPED | OUTPATIENT
Start: 2023-09-07

## 2023-09-07 RX ORDER — IPRATROPIUM BROMIDE AND ALBUTEROL SULFATE 2.5; .5 MG/3ML; MG/3ML
3 SOLUTION RESPIRATORY (INHALATION) 3 TIMES DAILY
Qty: 270 ML | Refills: 2 | Status: SHIPPED | OUTPATIENT
Start: 2023-09-07 | End: 2023-12-06

## 2023-09-07 RX ORDER — BUDESONIDE AND FORMOTEROL FUMARATE DIHYDRATE 80; 4.5 UG/1; UG/1
2 AEROSOL RESPIRATORY (INHALATION)
Qty: 10.2 G | Refills: 3 | Status: SHIPPED | OUTPATIENT
Start: 2023-09-07

## 2023-09-07 RX ORDER — ALBUTEROL SULFATE 90 UG/1
2 AEROSOL, METERED RESPIRATORY (INHALATION) EVERY 4 HOURS PRN
Qty: 18 G | Refills: 3 | Status: SHIPPED | OUTPATIENT
Start: 2023-09-07

## 2023-09-07 SDOH — ECONOMIC STABILITY: INCOME INSECURITY: HOW HARD IS IT FOR YOU TO PAY FOR THE VERY BASICS LIKE FOOD, HOUSING, MEDICAL CARE, AND HEATING?: NOT HARD AT ALL

## 2023-09-07 SDOH — ECONOMIC STABILITY: FOOD INSECURITY: WITHIN THE PAST 12 MONTHS, THE FOOD YOU BOUGHT JUST DIDN'T LAST AND YOU DIDN'T HAVE MONEY TO GET MORE.: OFTEN TRUE

## 2023-09-07 SDOH — ECONOMIC STABILITY: HOUSING INSECURITY
IN THE LAST 12 MONTHS, WAS THERE A TIME WHEN YOU DID NOT HAVE A STEADY PLACE TO SLEEP OR SLEPT IN A SHELTER (INCLUDING NOW)?: NO

## 2023-09-07 SDOH — ECONOMIC STABILITY: FOOD INSECURITY: WITHIN THE PAST 12 MONTHS, YOU WORRIED THAT YOUR FOOD WOULD RUN OUT BEFORE YOU GOT MONEY TO BUY MORE.: OFTEN TRUE

## 2023-09-07 ASSESSMENT — PATIENT HEALTH QUESTIONNAIRE - PHQ9
1. LITTLE INTEREST OR PLEASURE IN DOING THINGS: 3
SUM OF ALL RESPONSES TO PHQ9 QUESTIONS 1 & 2: 3
2. FEELING DOWN, DEPRESSED OR HOPELESS: 0
7. TROUBLE CONCENTRATING ON THINGS, SUCH AS READING THE NEWSPAPER OR WATCHING TELEVISION: 0
3. TROUBLE FALLING OR STAYING ASLEEP: 3
SUM OF ALL RESPONSES TO PHQ QUESTIONS 1-9: 9
8. MOVING OR SPEAKING SO SLOWLY THAT OTHER PEOPLE COULD HAVE NOTICED. OR THE OPPOSITE, BEING SO FIGETY OR RESTLESS THAT YOU HAVE BEEN MOVING AROUND A LOT MORE THAN USUAL: 0
4. FEELING TIRED OR HAVING LITTLE ENERGY: 0
SUM OF ALL RESPONSES TO PHQ QUESTIONS 1-9: 9
5. POOR APPETITE OR OVEREATING: 3
9. THOUGHTS THAT YOU WOULD BE BETTER OFF DEAD, OR OF HURTING YOURSELF: 0
SUM OF ALL RESPONSES TO PHQ QUESTIONS 1-9: 9
SUM OF ALL RESPONSES TO PHQ QUESTIONS 1-9: 9
10. IF YOU CHECKED OFF ANY PROBLEMS, HOW DIFFICULT HAVE THESE PROBLEMS MADE IT FOR YOU TO DO YOUR WORK, TAKE CARE OF THINGS AT HOME, OR GET ALONG WITH OTHER PEOPLE: 1
6. FEELING BAD ABOUT YOURSELF - OR THAT YOU ARE A FAILURE OR HAVE LET YOURSELF OR YOUR FAMILY DOWN: 0

## 2023-09-07 ASSESSMENT — ENCOUNTER SYMPTOMS
NAUSEA: 0
COUGH: 1
VOMITING: 0
WHEEZING: 0
SHORTNESS OF BREATH: 0
ABDOMINAL PAIN: 0

## 2023-09-07 NOTE — PROGRESS NOTES
Attending Physician Statement  I have discussed the care of Delwin Najjar, including pertinent history and exam findings with the resident. I have reviewed the key elements of all parts of the encounter with the resident. I have seen and examined the patient with the resident and the key elements of all parts of the encounter have been performed by me. I agree with the assessment, and status of the problem list as documented. The plan and orders should include   Orders Placed This Encounter   Procedures    HIV [address] In Psych ([address] [facility] In/Family Medicine)    Senait Hoyt MD, Gastroenterology, [facility] [employer] Rx Program    Full PFT Study With Bronchodilator    [employer] Comfort Form Wrist Brace    and this was also documented by the resident. I agree with the referral to Psychiatry, GI.      1. Tobacco abuse  - Counseled to quit smoking  - Pt has had PFTs in past and Dx of COPD  - Recheck PFTs  - Inhalers prescribed  - albuterol sulfate HFA (VENTOLIN HFA) 108 (90 Base) MCG/ACT inhaler; Inhale 2 puffs into the lungs every 4 hours as needed for Wheezing for wheezing  Dispense: 18 g; Refill: 3  - ipratropium 0.5 mg-albuterol 2.5 mg (DUONEB) 0.5-2.5 (3) MG/3ML SOLN nebulizer solution; Inhale 3 mLs into the lungs 3 times daily  Dispense: 270 mL; Refill: 2  - budesonide-formoterol (SYMBICORT) 80-4.5 MCG/ACT AERO; Inhale 2 puffs into the lungs in the morning and 2 puffs in the evening. Dispense: 10.2 g; Refill: 3  - Full PFT Study With Bronchodilator; Future    2. Bipolar disorder with psychotic features ([address])  - Pt agreeable to take Risperidone  - OP F/U with Psychiatry  - [address] [facility] In Psych ([address] [facility] In/Family Medicine)  - [employer] Rx Program  - risperiDONE (RISPERDAL) 0.5 MG tablet; Take 1 tablet by mouth nightly  Dispense: 30 tablet; Refill: 1    3.  Hx of diabetes mellitus  - Diet
incomplete/poor prep, hemorrhoids. ENTEROCELE REPAIR  13    ESOPHAGOSCOPY  1/22/15    HYSTERECTOMY (CERVIX STATUS UNKNOWN)  13    HETAL, Right Salpingectomy, Enterocele, SHON    LAPAROSCOPY  2013    Diagnostic converted to [facility]       due to cyst- right    TUBAL LIGATION      UPPER GASTROINTESTINAL ENDOSCOPY      WISDOM TOOTH EXTRACTION      x4       ALLERGIES:      Allergies   Allergen Reactions    Augmentin [Amoxicillin-Pot Clavulanate] Swelling     Causes throat swelling    Codeine     Chantix [Varenicline Tartrate] Rash    Magnesium Citrate Nausea And Vomiting         HOME MEDICATION:      Current Outpatient Medications on File Prior to Visit   Medication Sig Dispense Refill    dexlansoprazole (DEXILANT) 60 MG CPDR delayed release capsule Take 1 capsule by mouth at bedtime 30 capsule 0    Benzocaine-Menthol (SORE THROAT LOZENGES) 6-10 MG LOZG lozenge Take 1 lozenge by mouth every 2 hours as needed for Sore Throat (Patient not taking: Reported on 2023) 36 lozenge 0    mirtazapine (REMERON) 7.5 MG tablet Take 1 tablet by mouth nightly (Patient not taking: Reported on 2023) 30 tablet 3     No current facility-administered medications on file prior to visit. SOCIAL HISTORY:    TOBACCO:   reports that she has been smoking cigarettes. She has a 15.00 pack-year smoking history. She has never used smokeless tobacco.  ETOH:   reports no history of alcohol use. DRUGS:  reports current drug use. Drug: Marijuana [employer]).   OCCUPATION:      FAMILY HISTORY:          Problem Relation Age of Onset    Kidney Disease Mother     Diabetes Mother     Heart Disease Mother     Lung Cancer Maternal Grandmother     Diabetes Maternal Grandmother     Lung Cancer Maternal Grandfather     Diabetes Maternal Grandfather     Breast Cancer Neg Hx     Cancer Neg Hx     Colon Cancer Neg Hx     Eclampsia Neg Hx     Hypertension Neg Hx     Ovarian Cancer Neg Hx      Labor Neg Hx

## 2023-09-15 ENCOUNTER — TELEPHONE (OUTPATIENT)
Dept: INTERNAL MEDICINE | Age: 43
End: 2023-09-15

## 2023-09-27 ENCOUNTER — TELEPHONE (OUTPATIENT)
Age: 43
End: 2023-09-27

## 2023-09-27 NOTE — TELEPHONE ENCOUNTER
Writer called left a message on machine to schedule an appointment from inbound referral for Dr. Shane Gore

## 2023-10-06 NOTE — TELEPHONE ENCOUNTER
Writer called patient and scheduled an office appointment with Dr. Cui Home patient understood direction

## 2023-10-18 RX ORDER — DEXLANSOPRAZOLE 60 MG/1
60 CAPSULE, DELAYED RELEASE ORAL DAILY
Qty: 30 CAPSULE | Refills: 4 | Status: SHIPPED | OUTPATIENT
Start: 2023-10-18

## 2023-10-18 NOTE — TELEPHONE ENCOUNTER
.. Request for   Requested Prescriptions     Pending Prescriptions Disp Refills    dexlansoprazole (DEXILANT) 60 MG CPDR delayed release capsule [Pharmacy Med Name: DEXLANSOPRAZOLE 60 MG CPDR 60 Capsule] 30 capsule 4     Sig: TAKE 1 CAPSULE BY MOUTH DAILY FOR 30 DAYS    . Please review and e-scribe to pharmacy listed in chart if appropriate. Thank you.       Last Visit Date: 9/7/2023  Next Visit Date: 12/7/2023    Future Appointments   Date Time Provider [address] [place] [address]   11/15/2023  9:30 AM Dilcia Ayala MD [facility]   12/7/2023 10:00 AM Conrad Maharaj MD [facility]   12/12/2023  2:00 PM Rustam Ponce MD [facility] [address] Maintenance   Topic Date Due    Hepatitis B vaccine (1 of 3 - 3-dose series) Never done    COVID-19 Vaccine (1) Never done    Pneumococcal 0-64 years Vaccine (1 - PCV) Never done    HIV screen  Never done    DTaP/Tdap/Td vaccine (1 - Tdap) Never done    Flu vaccine (1) Never done    A1C test (Diabetic or Prediabetic)  01/08/2024    Depression Monitoring  09/07/2024    Lipids  09/01/2026    Hepatitis C screen  Completed    Hepatitis A vaccine  Aged Out    Hib vaccine  Aged Out    HPV vaccine  Aged Out    Meningococcal (ACWY) vaccine  Aged Out    Diabetic foot exam  Discontinued    Diabetic Alb to Cr ratio (uACR) test  Discontinued    Diabetic retinal exam  Discontinued    GFR test (Diabetes, CKD 3-4, OR last GFR 15-59)  Discontinued    Cervical cancer screen  Discontinued       Hemoglobin A1C (%)   Date Value   01/08/2023 6.2 (H)   06/27/2022 6.4   09/01/2021 6.2 (H)             ( goal A1C is < 7)   No components found for: \"LABMICR\"  LDL Cholesterol (mg/dL)   Date Value   09/01/2021 78       (goal LDL is <100)   AST (U/L)   Date Value   01/13/2023 33 (H)     ALT (U/L)   Date Value   01/13/2023 55 (H)     BUN (mg/dL)   Date Value   01/15/2023 9     BP Readings from Last 3 Encounters:   09/07/23 121/87   01/15/23 136/71   01/10/23 (!) 137/97          (goal

## 2023-10-23 DIAGNOSIS — F31.9 BIPOLAR DISORDER WITH PSYCHOTIC FEATURES (HCC): ICD-10-CM

## 2023-10-23 NOTE — TELEPHONE ENCOUNTER
Request for   Risperidone       Please review and e-scribe to pharmacy listed in chart if appropriate. Thank you.       Last Visit Date: 9/7/2023  Next Visit Date: 12/7/2023    Future Appointments   Date Time Provider [address]   11/15/2023  9:30 AM Mario Giles MD \[facility]"" [facility]   12/7/2023 10:00 AM [facility]   12/12/2023  2:00 PM Darin Zaragoza MD [facility]  [address] Maintenance   Topic Date Due    Hepatitis B vaccine (1 of 3 - 3-dose series) Never done    COVID-19 Vaccine (1) Never done    Pneumococcal 0-64 years Vaccine (1 - PCV) Never done    HIV screen  Never done    DTaP/Tdap/Td vaccine (1 - Tdap) Never done    Flu vaccine (1) Never done    A1C test (Diabetic or Prediabetic)  01/08/2024    Depression Monitoring  09/07/2024    Lipids  09/01/2026    Hepatitis C screen  Completed    Hepatitis A vaccine  Aged Out    Hib vaccine  Aged Out    HPV vaccine  Aged Out    Meningococcal (ACWY) vaccine  Aged Out    Diabetic foot exam  Discontinued    Diabetic Alb to Cr ratio (uACR) test  Discontinued    Diabetic retinal exam  Discontinued    GFR test (Diabetes, CKD 3-4, OR last GFR 15-59)  Discontinued    Cervical cancer screen  Discontinued       Hemoglobin A1C (%)   Date Value   01/08/2023 6.2 (H)   06/27/2022 6.4   09/01/2021 6.2 (H)             ( goal A1C is < 7)   No components found for: \"LABMICR\"  LDL Cholesterol (mg/dL)   Date Value   09/01/2021 78       (goal LDL is <100)   AST (U/L)   Date Value   01/13/2023 33 (H)     ALT (U/L)   Date Value   01/13/2023 55 (H)     BUN (mg/dL)   Date Value   01/15/2023 9     BP Readings from Last 3 Encounters:   09/07/23 121/87   01/15/23 136/71   01/10/23 (!) 137/97          (goal 120/80)    All Future Testing planned in [facility]  Lab Frequency Next Occurrence   EKG 12 Lead Once 03/09/2023   Comprehensive Metabolic Panel Once 98/59/1902   CBC Once 03/09/2023   Urinalysis Once 15/72/6657   Basic Metabolic Panel Once 32/00/9503   XR

## 2023-10-24 RX ORDER — RISPERIDONE 0.5 MG/1
0.5 TABLET ORAL NIGHTLY
Qty: 30 TABLET | Refills: 1 | Status: SHIPPED | OUTPATIENT
Start: 2023-10-24

## 2023-12-11 PROBLEM — F19.10 POLYSUBSTANCE ABUSE (HCC): Status: RESOLVED | Noted: 2023-01-08 | Resolved: 2023-12-11

## 2023-12-11 PROBLEM — T40.601A OPIATE OVERDOSE (HCC): Status: RESOLVED | Noted: 2023-01-07 | Resolved: 2023-12-11

## 2023-12-11 PROBLEM — F14.90 COCAINE USE: Status: RESOLVED | Noted: 2021-08-30 | Resolved: 2023-12-11

## 2023-12-12 ENCOUNTER — HOSPITAL ENCOUNTER (EMERGENCY)
Age: 43
Discharge: HOME | End: 2023-12-12
Attending: EMERGENCY MEDICINE
Payer: MEDICAID

## 2023-12-12 ENCOUNTER — APPOINTMENT (OUTPATIENT)
Dept: GENERAL RADIOLOGY | Age: 43
End: 2023-12-12
Payer: MEDICAID

## 2023-12-12 VITALS
TEMPERATURE: 98.3 F | OXYGEN SATURATION: 98 % | SYSTOLIC BLOOD PRESSURE: 126 MMHG | WEIGHT: 175 LBS | RESPIRATION RATE: 16 BRPM | DIASTOLIC BLOOD PRESSURE: 81 MMHG | HEIGHT: 63 IN | HEART RATE: 69 BPM | BODY MASS INDEX: 31.01 KG/M2

## 2023-12-12 DIAGNOSIS — Z72.0 TOBACCO ABUSE: ICD-10-CM

## 2023-12-12 DIAGNOSIS — M65.311 TRIGGER THUMB OF RIGHT HAND: Primary | ICD-10-CM

## 2023-12-12 DIAGNOSIS — F31.9 BIPOLAR DISORDER WITH PSYCHOTIC FEATURES (HCC): ICD-10-CM

## 2023-12-12 PROCEDURE — 99284 EMERGENCY DEPT VISIT MOD MDM: CPT

## 2023-12-12 PROCEDURE — 6370000000 HC RX 637 (ALT 250 FOR IP): Performed by: PHYSICIAN ASSISTANT

## 2023-12-12 PROCEDURE — 6360000002 HC RX W HCPCS: Performed by: PHYSICIAN ASSISTANT

## 2023-12-12 PROCEDURE — 73140 X-RAY EXAM OF FINGER(S): CPT

## 2023-12-12 PROCEDURE — 96372 THER/PROPH/DIAG INJ SC/IM: CPT

## 2023-12-12 RX ORDER — OXYCODONE HYDROCHLORIDE 5 MG/1
2.5 TABLET ORAL ONCE
Status: COMPLETED | OUTPATIENT
Start: 2023-12-12 | End: 2023-12-12

## 2023-12-12 RX ORDER — HYDROCODONE BITARTRATE AND ACETAMINOPHEN 5; 325 MG/1; MG/1
1 TABLET ORAL EVERY 8 HOURS PRN
Qty: 6 TABLET | Refills: 0 | Status: SHIPPED | OUTPATIENT
Start: 2023-12-12 | End: 2023-12-15

## 2023-12-12 RX ORDER — KETOROLAC TROMETHAMINE 30 MG/ML
15 INJECTION, SOLUTION INTRAMUSCULAR; INTRAVENOUS ONCE
Status: COMPLETED | OUTPATIENT
Start: 2023-12-12 | End: 2023-12-12

## 2023-12-12 RX ADMIN — KETOROLAC TROMETHAMINE 15 MG: 30 INJECTION INTRAMUSCULAR; INTRAVENOUS at 16:11

## 2023-12-12 RX ADMIN — OXYCODONE 2.5 MG: 5 TABLET ORAL at 16:10

## 2023-12-12 ASSESSMENT — PAIN - FUNCTIONAL ASSESSMENT: PAIN_FUNCTIONAL_ASSESSMENT: 0-10

## 2023-12-12 ASSESSMENT — PAIN SCALES - GENERAL
PAINLEVEL_OUTOF10: 10
PAINLEVEL_OUTOF10: 10

## 2023-12-12 ASSESSMENT — PAIN DESCRIPTION - ORIENTATION: ORIENTATION: RIGHT

## 2023-12-12 ASSESSMENT — PAIN DESCRIPTION - DESCRIPTORS: DESCRIPTORS: DISCOMFORT

## 2023-12-12 ASSESSMENT — PAIN DESCRIPTION - LOCATION: LOCATION: FINGER (COMMENT WHICH ONE)

## 2023-12-12 NOTE — DISCHARGE INSTRUCTIONS
Wear the thumb splint for protection. Make an appointment with ortho as soon as possible as you will likely need a cortisone injection or a trigger thumb release to address your symptoms.

## 2023-12-12 NOTE — TELEPHONE ENCOUNTER
Request for Resperidone. Please review and e-scribe to pharmacy listed in chart if appropriate. Thank you. Last Visit Date: 9/7/2023  Next Visit Date: Visit date not found    No future appointments.     Health Maintenance   Topic Date Due    Hepatitis B vaccine (1 of 3 - 3-dose series) Never done    COVID-19 Vaccine (1) Never done    Pneumococcal 0-64 years Vaccine (1 - PCV) Never done    HIV screen  Never done    DTaP/Tdap/Td vaccine (1 - Tdap) Never done    Flu vaccine (1) Never done    A1C test (Diabetic or Prediabetic)  01/08/2024    Depression Monitoring  09/07/2024    Lipids  09/01/2026    Colorectal Cancer Screen  08/17/2032    Hepatitis C screen  Completed    Hepatitis A vaccine  Aged Out    Hib vaccine  Aged Out    HPV vaccine  Aged Out    Meningococcal (ACWY) vaccine  Aged Out    Diabetic foot exam  Discontinued    Diabetic Alb to Cr ratio (uACR) test  Discontinued    Diabetic retinal exam  Discontinued    GFR test (Diabetes, CKD 3-4, OR last GFR 15-59)  Discontinued    Cervical cancer screen  Discontinued       Hemoglobin A1C (%)   Date Value   01/08/2023 6.2 (H)   06/27/2022 6.4   09/01/2021 6.2 (H)             ( goal A1C is < 7)   No components found for: \"LABMICR\"  LDL Cholesterol (mg/dL)   Date Value   09/01/2021 78       (goal LDL is <100)   AST (U/L)   Date Value   01/13/2023 33 (H)     ALT (U/L)   Date Value   01/13/2023 55 (H)     BUN (mg/dL)   Date Value   01/15/2023 9     BP Readings from Last 3 Encounters:   12/12/23 126/81   09/07/23 121/87   01/15/23 136/71          (goal 120/80)    All Future Testing planned in [facility]  Lab Frequency Next Occurrence   EKG 12 Lead Once 03/09/2023   Comprehensive Metabolic Panel Once 97/32/1987   CBC Once 03/09/2023   Urinalysis Once 27/51/4409   Basic Metabolic Panel Once 20/87/7835   XR CHEST STANDARD (2 VW) Once 01/15/2023   CT CHEST WO CONTRAST Once 02/24/2023   HIV Screen Once 09/07/2023   Full PFT Study With Bronchodilator Once 09/07/2023

## 2023-12-12 NOTE — TELEPHONE ENCOUNTER
.. Request for   Requested Prescriptions     Pending Prescriptions Disp Refills    SYMBICORT 80-4.5 MCG/ACT AERO [Pharmacy Med Name: SYMBICORT AER 80-4.5 80-4.5 Aerosol] 10.2 g 3     Sig: INHALE 2 PUFFS INTO THE LUNGS IN THE MORNING AND 2 PUFFS IN THE EVENING. Federico Hillman Please review and e-scribe to pharmacy listed in chart if appropriate. Thank you. Last Visit Date: 9/7/2023  Next Visit Date: 12/12/2023    No future appointments.     Health Maintenance   Topic Date Due    Hepatitis B vaccine (1 of 3 - 3-dose series) Never done    COVID-19 Vaccine (1) Never done    Pneumococcal 0-64 years Vaccine (1 - PCV) Never done    HIV screen  Never done    DTaP/Tdap/Td vaccine (1 - Tdap) Never done    Flu vaccine (1) Never done    A1C test (Diabetic or Prediabetic)  01/08/2024    Depression Monitoring  09/07/2024    Lipids  09/01/2026    Colorectal Cancer Screen  08/17/2032    Hepatitis C screen  Completed    Hepatitis A vaccine  Aged Out    Hib vaccine  Aged Out    HPV vaccine  Aged Out    Meningococcal (ACWY) vaccine  Aged Out    Diabetic foot exam  Discontinued    Diabetic Alb to Cr ratio (uACR) test  Discontinued    Diabetic retinal exam  Discontinued    GFR test (Diabetes, CKD 3-4, OR last GFR 15-59)  Discontinued    Cervical cancer screen  Discontinued       Hemoglobin A1C (%)   Date Value   01/08/2023 6.2 (H)   06/27/2022 6.4   09/01/2021 6.2 (H)             ( goal A1C is < 7)   No components found for: \"LABMICR\"  LDL Cholesterol (mg/dL)   Date Value   09/01/2021 78       (goal LDL is <100)   AST (U/L)   Date Value   01/13/2023 33 (H)     ALT (U/L)   Date Value   01/13/2023 55 (H)     BUN (mg/dL)   Date Value   01/15/2023 9     BP Readings from Last 3 Encounters:   12/12/23 126/81   09/07/23 121/87   01/15/23 136/71          (goal 120/80)    All Future Testing planned in [facility]  Lab Frequency Next Occurrence   EKG 12 Lead Once 03/09/2023   Comprehensive Metabolic Panel Once 22/40/1009   CBC Once 03/09/2023

## 2023-12-13 RX ORDER — DILTIAZEM HYDROCHLORIDE 60 MG/1
2 TABLET, FILM COATED ORAL 2 TIMES DAILY
Qty: 10.2 G | Refills: 3 | Status: SHIPPED | OUTPATIENT
Start: 2023-12-13

## 2023-12-13 RX ORDER — RISPERIDONE 0.5 MG/1
0.5 TABLET ORAL NIGHTLY
Qty: 30 TABLET | Refills: 1 | Status: SHIPPED | OUTPATIENT
Start: 2023-12-13

## 2023-12-13 NOTE — ED PROVIDER NOTES
[address] [facility],6Th Floor ED  eMERGENCY dEPARTMENT eNCOUnter   Independent Attestation     Pt Name: Cordell Mix  MRN: [id number]  [address] [facility] 1980  Date of evaluation: 12/12/23       Cordell Mix is a 37 y.o. female who presents with Hand Pain (Rt thumb pain/injury)        Based on the medical record, the care appears appropriate. I was personally available for consultation in the Emergency Department.     Harika Pablo MD  Attending Emergency  Physician               Harika Pablo MD  12/13/23 [id number]
Endometriosis 1/23/2013    new dx    Fibroid     GERD (gastroesophageal reflux disease)     History of nipple discharge     Hyperlipidemia     Hypertension     no longer on meds-anxiety induced HTN    Hypothyroidism     Irritable bowel syndrome     LGSIL (low grade squamous intraepithelial dysplasia) 10/11    Menorrhagia     Midline low back pain without sciatica 1/26/2016    Mood swings     Ovarian cyst     Pelvic adhesions     Pelvic pain     Prolactin increased 2/12, 11/12    Rectal bleeding     Type II or unspecified type diabetes mellitus without mention of complication, not stated as uncontrolled     Dr. Marsha Almanzar    Vocal cord polyps     Vomiting     Wellness examination     Dr. Trent Burns last seen 5/13/2020     Patient Active Problem List   Diagnosis Code    Panic anxiety syndrome F41.0    IBS (irritable bowel syndrome) K58.9    Agoraphobia F40.00    Gastroesophageal reflux disease  K21.9    Major depression with psychotic features ([address]) F32.3    Bipolar disorder with psychotic features ([address]) F31.9    Hx of diabetes mellitus Z86.39    Esophageal dilatation K22.89     SURGICAL HISTORY       Past Surgical History:   Procedure Laterality Date    ABDOMINAL ADHESION SURGERY  9/23/13    CARPAL TUNNEL RELEASE Right 06/24/2020    CARPAL TUNNEL RELEASE Right 6/24/2020    CARPAL TUNNEL RELEASE (SUPINE) 3080 TABLE, ARM TABLE performed by Joanne Clark DO at [address]  2/4/14    Benign biopsies    COLONOSCOPY  7-14-15    incomplete/poor prep, hemorrhoids.     ENTEROCELE REPAIR  9/23/13    ESOPHAGOSCOPY  1/22/15    HYSTERECTOMY (CERVIX STATUS UNKNOWN)  9/23/13    HETAL, Right Salpingectomy, Enterocele, SHON    LAPAROSCOPY  1/23/2013    Diagnostic converted to [facility]       due to cyst- right    TUBAL LIGATION      UPPER GASTROINTESTINAL ENDOSCOPY      WISDOM TOOTH EXTRACTION      x4     CURRENT MEDICATIONS       No current facility-administered medications on file

## 2024-01-30 DIAGNOSIS — F31.9 BIPOLAR DISORDER WITH PSYCHOTIC FEATURES (HCC): ICD-10-CM

## 2024-01-31 RX ORDER — RISPERIDONE 0.5 MG/1
0.5 TABLET ORAL NIGHTLY
Qty: 30 TABLET | Refills: 1 | Status: SHIPPED | OUTPATIENT
Start: 2024-01-31

## 2024-01-31 NOTE — TELEPHONE ENCOUNTER
A Refill Has Been Requested for Courtney Allen    Medication Requested  Risperidone      Last Visit Date (If Applicable)  Visit date not found    Next Visit Date (If Applicable)  Visit date not found

## 2024-02-12 NOTE — TELEPHONE ENCOUNTER
A Refill Has Been Requested for Courtney Allen    Medication Requested  Requested Prescriptions     Pending Prescriptions Disp Refills    dexlansoprazole (DEXILANT) 60 MG CPDR delayed release capsule [Pharmacy Med Name: DEXLANSOPRAZOLE 60 MG CPDR 60 Capsule] 30 capsule 4     Sig: TAKE 1 CAPSULE BY MOUTH DAILY FOR 30 DAYS       Last Visit Date (If Applicable)  9/7/2023    Next Visit Date (If Applicable)  Visit date not found

## 2024-02-13 RX ORDER — DEXLANSOPRAZOLE 60 MG/1
60 CAPSULE, DELAYED RELEASE ORAL DAILY
Qty: 30 CAPSULE | Refills: 4 | Status: SHIPPED | OUTPATIENT
Start: 2024-02-13

## 2024-03-18 DIAGNOSIS — F31.9 BIPOLAR DISORDER WITH PSYCHOTIC FEATURES (HCC): ICD-10-CM

## 2024-03-18 DIAGNOSIS — Z72.0 TOBACCO ABUSE: ICD-10-CM

## 2024-03-18 RX ORDER — RISPERIDONE 0.5 MG/1
0.5 TABLET ORAL NIGHTLY
Qty: 30 TABLET | Refills: 1 | Status: SHIPPED | OUTPATIENT
Start: 2024-03-18

## 2024-03-18 RX ORDER — DILTIAZEM HYDROCHLORIDE 60 MG/1
2 TABLET, FILM COATED ORAL 2 TIMES DAILY
Qty: 10.2 G | Refills: 3 | Status: SHIPPED | OUTPATIENT
Start: 2024-03-18

## 2024-03-18 NOTE — TELEPHONE ENCOUNTER
A Refill Has Been Requested for Courtney Allen    Medication Requested  Risperdal      Last Visit Date (If Applicable)  9/7/2023    Next Visit Date (If Applicable)  Visit date not found

## 2024-03-18 NOTE — TELEPHONE ENCOUNTER
A Refill Has Been Requested for Courtney Allen    Medication Requested  Requested Prescriptions     Pending Prescriptions Disp Refills    SYMBICORT 80-4.5 MCG/ACT AERO [Pharmacy Med Name: SYMBICORT AER 80-4.5 80-4.5 Aerosol] 10.2 g 3     Sig: INHALE 2 PUFFS INTO THE LUNGS IN THE MORNING AND 2 PUFFS IN THE EVENING.       Last Visit Date (If Applicable)  Visit date not found    Next Visit Date (If Applicable)  Visit date not found

## 2024-04-01 DIAGNOSIS — Z72.0 TOBACCO ABUSE: ICD-10-CM

## 2024-04-01 NOTE — TELEPHONE ENCOUNTER
A Refill Has Been Requested for Courtney Allen    Medication Requested  Requested Prescriptions     Pending Prescriptions Disp Refills    albuterol sulfate HFA (PROVENTIL;VENTOLIN;PROAIR) 108 (90 Base) MCG/ACT inhaler [Pharmacy Med Name: ALBUTEROL SULF HFA (VENT) 108 (90 BAS Aerosol] 18 g 3     Sig: INHALE 2 PUFFS INTO THE LUNGS EVERY 4 HOURS AS NEEDED FOR WHEEZING FOR WHEEZING       Last Visit Date (If Applicable)  Visit date not found    Next Visit Date (If Applicable)  Visit date not found

## 2024-04-02 RX ORDER — ALBUTEROL SULFATE 90 UG/1
2 AEROSOL, METERED RESPIRATORY (INHALATION) EVERY 4 HOURS PRN
Qty: 18 G | Refills: 3 | Status: SHIPPED | OUTPATIENT
Start: 2024-04-02

## 2024-04-29 DIAGNOSIS — F31.9 BIPOLAR DISORDER WITH PSYCHOTIC FEATURES (HCC): ICD-10-CM

## 2024-04-30 RX ORDER — RISPERIDONE 0.5 MG/1
0.5 TABLET ORAL NIGHTLY
Qty: 30 TABLET | Refills: 1 | Status: SHIPPED | OUTPATIENT
Start: 2024-04-30

## 2024-04-30 NOTE — TELEPHONE ENCOUNTER
Courtney Allen is calling to request a refill on the following medication(s):    Medication Request:  Risperdal      Last Visit Date (If Applicable):  9/7/2023    Next Visit Date:    Visit date not found

## 2024-05-08 ENCOUNTER — OFFICE VISIT (OUTPATIENT)
Dept: ORTHOPEDIC SURGERY | Age: 44
End: 2024-05-08
Payer: MEDICAID

## 2024-05-08 VITALS — WEIGHT: 175 LBS | HEIGHT: 63 IN | BODY MASS INDEX: 31.01 KG/M2

## 2024-05-08 DIAGNOSIS — R52 PAIN: Primary | ICD-10-CM

## 2024-05-08 PROCEDURE — 4004F PT TOBACCO SCREEN RCVD TLK: CPT

## 2024-05-08 PROCEDURE — G8427 DOCREV CUR MEDS BY ELIG CLIN: HCPCS

## 2024-05-08 PROCEDURE — G8417 CALC BMI ABV UP PARAM F/U: HCPCS

## 2024-05-08 PROCEDURE — 99213 OFFICE O/P EST LOW 20 MIN: CPT

## 2024-05-15 DIAGNOSIS — M65.342 TRIGGER RING FINGER OF LEFT HAND: Primary | ICD-10-CM

## 2024-05-15 DIAGNOSIS — M65.322 TRIGGER INDEX FINGER OF LEFT HAND: ICD-10-CM

## 2024-05-15 RX ORDER — LORAZEPAM 2 MG/1
2 TABLET ORAL ONCE
Qty: 1 TABLET | Refills: 0 | Status: SHIPPED | OUTPATIENT
Start: 2024-05-15 | End: 2024-05-15

## 2024-05-15 NOTE — TELEPHONE ENCOUNTER
Bilateral thumb pain    Patient is scheduled for MRI of bilateral hands on 5/17/24 and she states that she has anxiety and is claustrophobic and is requesting a pre medication prior to MRI. Please advise

## 2024-05-17 ENCOUNTER — HOSPITAL ENCOUNTER (OUTPATIENT)
Dept: MRI IMAGING | Age: 44
End: 2024-05-17
Payer: MEDICAID

## 2024-05-17 DIAGNOSIS — R52 PAIN: ICD-10-CM

## 2024-05-17 PROCEDURE — 73218 MRI UPPER EXTREMITY W/O DYE: CPT

## 2024-06-05 ENCOUNTER — OFFICE VISIT (OUTPATIENT)
Dept: ORTHOPEDIC SURGERY | Age: 44
End: 2024-06-05

## 2024-06-05 VITALS — WEIGHT: 180 LBS | BODY MASS INDEX: 31.89 KG/M2 | HEIGHT: 63 IN

## 2024-06-05 DIAGNOSIS — M79.644 PAIN OF RIGHT THUMB: ICD-10-CM

## 2024-06-05 DIAGNOSIS — M79.645 PAIN OF LEFT THUMB: Primary | ICD-10-CM

## 2024-06-05 RX ORDER — METHYLPREDNISOLONE 4 MG/1
TABLET ORAL
Qty: 1 KIT | Refills: 0 | Status: SHIPPED | OUTPATIENT
Start: 2024-06-05 | End: 2024-06-11

## 2024-06-05 NOTE — PROGRESS NOTES
symptoms.  If that does not improve her symptoms, she will consider trigger finger releases in the future.  -Follow-up in 6 weeks for reevaluation.  -Patient understands the current plan and is in agreement.      Follow up:No follow-ups on file.    Orders Placed This Encounter   Medications    methylPREDNISolone (MEDROL DOSEPACK) 4 MG tablet     Sig: Take by mouth.     Dispense:  1 kit     Refill:  0          Orders Placed This Encounter   Procedures    XR HAND LEFT (MIN 3 VIEWS)     Standing Status:   Future     Number of Occurrences:   1     Standing Expiration Date:   6/5/2025     Order Specific Question:   Reason for exam:     Answer:   Inability to flex thumb. AP, oblique, lateral    [facility] Occupational Therapy [facility]     Referral Priority:   Routine     Referral Type:   Eval and Treat     Referral Reason:   Specialty Services Required     Requested Specialty:   Occupational Therapy     Number of Visits Requested:   1       Shon Kumar DO  Orthopedic Surgery Resident, PGY-1  [place], [place]

## 2024-06-11 RX ORDER — DEXLANSOPRAZOLE 60 MG/1
60 CAPSULE, DELAYED RELEASE ORAL DAILY
Qty: 30 CAPSULE | Refills: 4 | Status: SHIPPED | OUTPATIENT
Start: 2024-06-11

## 2024-06-11 NOTE — TELEPHONE ENCOUNTER
Courtney lAlen is calling to request a refill on the following medication(s):    Medication Request:  Requested Prescriptions     Pending Prescriptions Disp Refills    dexlansoprazole (DEXILANT) 60 MG CPDR delayed release capsule [Pharmacy Med Name: DEXLANSOPRAZOLE 60 MG CPDR 60 Capsule] 30 capsule 4     Sig: TAKE 1 CAPSULE BY MOUTH DAILY       Last Visit Date (If Applicable):  9/7/2023    Next Visit Date:    Visit date not found

## 2024-06-17 DIAGNOSIS — Z72.0 TOBACCO ABUSE: ICD-10-CM

## 2024-06-17 NOTE — TELEPHONE ENCOUNTER
Courtney Allen is calling to request a refill on the following medication(s):    Medication Request:  Requested Prescriptions     Pending Prescriptions Disp Refills    SYMBICORT 80-4.5 MCG/ACT AERO [Pharmacy Med Name: SYMBICORT AER 80-4.5 80-4.5 Aerosol] 10.2 g 3     Sig: INHALE 2 PUFFS INTO THE LUNGS IN THE MORNING AND 2 PUFFS IN THE EVENING.       Last Visit Date (If Applicable):  9/7/2023    Next Visit Date:    Visit date not found

## 2024-06-18 RX ORDER — DILTIAZEM HYDROCHLORIDE 60 MG/1
2 TABLET, FILM COATED ORAL 2 TIMES DAILY
Qty: 10.2 G | Refills: 3 | Status: SHIPPED | OUTPATIENT
Start: 2024-06-18

## 2024-06-19 ENCOUNTER — TELEPHONE (OUTPATIENT)
Dept: INTERNAL MEDICINE | Age: 44
End: 2024-06-19

## 2024-06-24 DIAGNOSIS — F31.9 BIPOLAR DISORDER WITH PSYCHOTIC FEATURES (HCC): ICD-10-CM

## 2024-06-24 NOTE — TELEPHONE ENCOUNTER
Request for   Risperidone   .      Please review and e-scribe to pharmacy listed in chart if appropriate. Thank you.      Last Visit Date: 9/7/2023  Next Visit Date: Visit date not found    Future Appointments   Date Time Provider Department Center   7/17/2024 11:50 AM SCHEDULE, [facility]       Health Maintenance   Topic Date Due    Hepatitis B vaccine (1 of 3 - 3-dose series) Never done    COVID-19 Vaccine (1) Never done    Pneumococcal 0-64 years Vaccine (1 of 2 - PCV) Never done    HIV screen  Never done    DTaP/Tdap/Td vaccine (1 - Tdap) Never done    Breast cancer screen  Never done    A1C test (Diabetic or Prediabetic)  01/08/2024    Flu vaccine (Season Ended) 08/01/2024    Depression Monitoring  09/07/2024    Lipids  09/01/2026    Colorectal Cancer Screen  08/17/2032    Hepatitis C screen  Completed    Hepatitis A vaccine  Aged Out    Hib vaccine  Aged Out    HPV vaccine  Aged Out    Polio vaccine  Aged Out    Meningococcal (ACWY) vaccine  Aged Out    Diabetic foot exam  Discontinued    Diabetic Alb to Cr ratio (uACR) test  Discontinued    Diabetic retinal exam  Discontinued    GFR test (Diabetes, CKD 3-4, OR last GFR 15-59)  Discontinued    Cervical cancer screen  Discontinued       Hemoglobin A1C (%)   Date Value   01/08/2023 6.2 (H)   06/27/2022 6.4   09/01/2021 6.2 (H)             ( goal A1C is < 7)   No components found for: \"LABMICR\"  No components found for: \"LDLCHOLESTEROL\", \"LDLCALC\"    (goal LDL is <100)   AST (U/L)   Date Value   01/13/2023 33 (H)     ALT (U/L)   Date Value   01/13/2023 55 (H)     BUN (mg/dL)   Date Value   01/15/2023 9     BP Readings from Last 3 Encounters:   12/12/23 126/81   09/07/23 121/87   01/15/23 136/71          (goal 120/80)    All Future Testing planned in [facility]  Lab Frequency Next Occurrence   HIV Screen Once 09/07/2023   Full PFT Study With Bronchodilator Once 09/07/2023         Patient Active Problem List:     Panic anxiety

## 2024-06-26 RX ORDER — RISPERIDONE 0.5 MG/1
0.5 TABLET ORAL NIGHTLY
Qty: 30 TABLET | Refills: 1 | Status: SHIPPED | OUTPATIENT
Start: 2024-06-26

## 2024-07-08 ENCOUNTER — TELEPHONE (OUTPATIENT)
Dept: INTERNAL MEDICINE | Age: 44
End: 2024-07-08

## 2024-07-08 NOTE — TELEPHONE ENCOUNTER
IR note    33 year old female with symptomatic lower extremity varicose veins presents for follow up and review of her venous insufficiency ultrasound. Her symptoms are unchanged and she finds it difficult to work and to care for her children due to pain and bilateral leg swelling that worsens through the day despite wearing compression stockings. Her US demonstrates bilateral great saphenous venous reflux. We discussed the risks and benefits of GSV vein ablation. She would like to move forward with the procedure.     Chuy Villegas MD  IR   Pt last seen 9/7/23, no lung/asthma diagnoses noted in chart though pt is on inhalers.    PC to pt to schedule appt for evaluation of requested documentation, no answer. Left HIPAA compliant message identifying self and nature of call, requested call back to office to schedule an appt, phone number given.

## 2024-07-08 NOTE — TELEPHONE ENCOUNTER
----- Message from John Kan sent at 7/2/2024 10:32 AM EDT -----  Regarding: ECC Message to Provider  ECC Message to Provider    Relationship to Patient: Self     Additional Information patient wants to have a medical letter indicating that she needs an air conditioner and a breathing machine for COPD.   --------------------------------------------------------------------------------------------------------------------------    Call Back Information: OK to leave message on voicemail  Preferred Call Back Number: Phone [phone]

## 2024-08-22 DIAGNOSIS — F31.9 BIPOLAR DISORDER WITH PSYCHOTIC FEATURES (HCC): ICD-10-CM

## 2024-08-22 RX ORDER — RISPERIDONE 0.5 MG/1
0.5 TABLET ORAL NIGHTLY
Qty: 30 TABLET | Refills: 1 | Status: SHIPPED | OUTPATIENT
Start: 2024-08-22

## 2024-08-22 NOTE — TELEPHONE ENCOUNTER
Courtney Allen is calling to request a refill on the following medication(s):    Medication Request:        Last Visit Date (If Applicable):  9/7/2023    Next Visit Date:    Visit date not found

## 2024-09-23 ENCOUNTER — OFFICE VISIT (OUTPATIENT)
Dept: INTERNAL MEDICINE | Age: 44
End: 2024-09-23
Payer: MEDICAID

## 2024-09-23 VITALS
TEMPERATURE: 97.6 F | OXYGEN SATURATION: 97 % | WEIGHT: 190.6 LBS | DIASTOLIC BLOOD PRESSURE: 88 MMHG | HEART RATE: 98 BPM | HEIGHT: 61 IN | SYSTOLIC BLOOD PRESSURE: 122 MMHG | BODY MASS INDEX: 35.98 KG/M2

## 2024-09-23 DIAGNOSIS — F99 INSOMNIA DUE TO OTHER MENTAL DISORDER: ICD-10-CM

## 2024-09-23 DIAGNOSIS — Z12.31 ENCOUNTER FOR SCREENING MAMMOGRAM FOR MALIGNANT NEOPLASM OF BREAST: ICD-10-CM

## 2024-09-23 DIAGNOSIS — K21.9 GASTROESOPHAGEAL REFLUX DISEASE WITHOUT ESOPHAGITIS: Primary | ICD-10-CM

## 2024-09-23 DIAGNOSIS — F51.05 INSOMNIA DUE TO OTHER MENTAL DISORDER: ICD-10-CM

## 2024-09-23 DIAGNOSIS — Z86.39 HX OF DIABETES MELLITUS: ICD-10-CM

## 2024-09-23 DIAGNOSIS — J44.9 CHRONIC OBSTRUCTIVE PULMONARY DISEASE, UNSPECIFIED COPD TYPE (HCC): ICD-10-CM

## 2024-09-23 DIAGNOSIS — F31.9 BIPOLAR DISORDER WITH PSYCHOTIC FEATURES (HCC): ICD-10-CM

## 2024-09-23 PROCEDURE — 99213 OFFICE O/P EST LOW 20 MIN: CPT

## 2024-09-23 PROCEDURE — G8417 CALC BMI ABV UP PARAM F/U: HCPCS

## 2024-09-23 PROCEDURE — 4004F PT TOBACCO SCREEN RCVD TLK: CPT

## 2024-09-23 PROCEDURE — G8427 DOCREV CUR MEDS BY ELIG CLIN: HCPCS

## 2024-09-23 PROCEDURE — 3023F SPIROM DOC REV: CPT

## 2024-09-23 RX ORDER — LANOLIN ALCOHOL/MO/W.PET/CERES
3 CREAM (GRAM) TOPICAL DAILY
Qty: 30 TABLET | Refills: 3 | Status: SHIPPED | OUTPATIENT
Start: 2024-09-23

## 2024-09-23 RX ORDER — FLUTICASONE FUROATE, UMECLIDINIUM BROMIDE AND VILANTEROL TRIFENATATE 100; 62.5; 25 UG/1; UG/1; UG/1
2 POWDER RESPIRATORY (INHALATION) DAILY
COMMUNITY

## 2024-09-23 SDOH — ECONOMIC STABILITY: INCOME INSECURITY: HOW HARD IS IT FOR YOU TO PAY FOR THE VERY BASICS LIKE FOOD, HOUSING, MEDICAL CARE, AND HEATING?: NOT VERY HARD

## 2024-09-23 SDOH — ECONOMIC STABILITY: FOOD INSECURITY: WITHIN THE PAST 12 MONTHS, THE FOOD YOU BOUGHT JUST DIDN'T LAST AND YOU DIDN'T HAVE MONEY TO GET MORE.: NEVER TRUE

## 2024-09-23 SDOH — ECONOMIC STABILITY: FOOD INSECURITY: WITHIN THE PAST 12 MONTHS, YOU WORRIED THAT YOUR FOOD WOULD RUN OUT BEFORE YOU GOT MONEY TO BUY MORE.: NEVER TRUE

## 2024-09-23 ASSESSMENT — ENCOUNTER SYMPTOMS
BACK PAIN: 0
VOICE CHANGE: 0
SORE THROAT: 0
EYE ITCHING: 0
RHINORRHEA: 0
NAUSEA: 0
SHORTNESS OF BREATH: 1
WHEEZING: 0
ABDOMINAL PAIN: 0
COUGH: 1
EYE DISCHARGE: 0
VOMITING: 0

## 2024-09-23 ASSESSMENT — PATIENT HEALTH QUESTIONNAIRE - PHQ9
6. FEELING BAD ABOUT YOURSELF - OR THAT YOU ARE A FAILURE OR HAVE LET YOURSELF OR YOUR FAMILY DOWN: NOT AT ALL
1. LITTLE INTEREST OR PLEASURE IN DOING THINGS: NOT AT ALL
SUM OF ALL RESPONSES TO PHQ QUESTIONS 1-9: 7
10. IF YOU CHECKED OFF ANY PROBLEMS, HOW DIFFICULT HAVE THESE PROBLEMS MADE IT FOR YOU TO DO YOUR WORK, TAKE CARE OF THINGS AT HOME, OR GET ALONG WITH OTHER PEOPLE: NOT DIFFICULT AT ALL
9. THOUGHTS THAT YOU WOULD BE BETTER OFF DEAD, OR OF HURTING YOURSELF: NOT AT ALL
SUM OF ALL RESPONSES TO PHQ9 QUESTIONS 1 & 2: 0
5. POOR APPETITE OR OVEREATING: MORE THAN HALF THE DAYS
7. TROUBLE CONCENTRATING ON THINGS, SUCH AS READING THE NEWSPAPER OR WATCHING TELEVISION: NOT AT ALL
2. FEELING DOWN, DEPRESSED OR HOPELESS: NOT AT ALL
SUM OF ALL RESPONSES TO PHQ QUESTIONS 1-9: 7
3. TROUBLE FALLING OR STAYING ASLEEP: NEARLY EVERY DAY
8. MOVING OR SPEAKING SO SLOWLY THAT OTHER PEOPLE COULD HAVE NOTICED. OR THE OPPOSITE, BEING SO FIGETY OR RESTLESS THAT YOU HAVE BEEN MOVING AROUND A LOT MORE THAN USUAL: NOT AT ALL
SUM OF ALL RESPONSES TO PHQ QUESTIONS 1-9: 7
SUM OF ALL RESPONSES TO PHQ QUESTIONS 1-9: 7
4. FEELING TIRED OR HAVING LITTLE ENERGY: MORE THAN HALF THE DAYS

## 2024-09-26 ENCOUNTER — CARE COORDINATION (OUTPATIENT)
Dept: CARE COORDINATION | Age: 44
End: 2024-09-26

## 2024-10-08 DIAGNOSIS — F31.9 BIPOLAR DISORDER WITH PSYCHOTIC FEATURES (HCC): ICD-10-CM

## 2024-10-08 RX ORDER — DEXLANSOPRAZOLE 60 MG/1
60 CAPSULE, DELAYED RELEASE ORAL DAILY
Qty: 30 CAPSULE | Refills: 4 | Status: SHIPPED | OUTPATIENT
Start: 2024-10-08

## 2024-10-08 RX ORDER — RISPERIDONE 0.5 MG/1
0.5 TABLET ORAL NIGHTLY
Qty: 30 TABLET | Refills: 1 | Status: SHIPPED | OUTPATIENT
Start: 2024-10-08

## 2024-10-08 NOTE — TELEPHONE ENCOUNTER
Courtney Allen is calling to request a refill on the following medication(s):    Medication Request:        Last Visit Date (If Applicable):  9/23/2024    Next Visit Date:    12/23/2024

## 2024-10-10 ENCOUNTER — TELEPHONE (OUTPATIENT)
Dept: INTERNAL MEDICINE | Age: 44
End: 2024-10-10

## 2024-10-10 NOTE — TELEPHONE ENCOUNTER
Received request for PA in/on [email or web address] for Dexilant (dexlansoprazole).    Per chart review, pt states only this medication helps with GERD. PA completed and sent to insurance via [employer]. Pt has extensive history of alternate medication trials.    Received immediate response:  Approved today by [employer] 2017  Your PA request for [id number] was approved for 180 days. The PA# assigned is [id number].  Authorization Expiration Date: 4/6/2025    PC to pharmacy to notify of approval, left secure VM.

## 2024-10-11 ENCOUNTER — CARE COORDINATION (OUTPATIENT)
Dept: CARE COORDINATION | Age: 44
End: 2024-10-11

## 2024-10-11 NOTE — CARE COORDINATION
Ambulatory Care Coordination Note     10/11/2024 3:03 PM     Patient Current Location:  Home: [address]  [place] [address]     This patient was received as a referral from Provider.    ACM contacted the patient by telephone. Verified name and  with patient as identifiers. Provided introduction to self, and explanation of the ACM role.   Patient accepted care management services at this time.        ACM: Sarwat Calles RN     Challenges to be reviewed by the provider   Additional needs identified to be addressed with provider No  none               Method of communication with provider: staff message.    Has the patient been seen in the ED since your last call? no    Care Summary Note:     Ambulatory Care referral received from PCP.  Writer phoned Patient to introduce self and explain ACM.  Patient was drowsy during our telephone call.  She states she had surgery on her foot yesterday.  She request to complete ACM enrollment questions next week.    Procedures   BUNIONECTOMY FOOT left  EXCISION LESION SKIN LOWER EXTREMITY     She is currently taking Percocet for pain.  Patient states she walks around bare foot a lot.  She states she had a metal shaving removed from her foot and a bunion repair.  Patient states she has to wear a boot for 2 weeks.    Patient states she had a lot of pain in her foot yesterday.  She states her boot was pumped up with too much air.  She states it was so tight that it was hurting her foot.  She states she watched you tube to find out how to let air out of her boot.  She let air out of the boot.  Patient states she took two Percocet today around 11 am.  She states she purchased Ibuprofen OTC.  She took one Ibuprofen 200 mg to help with pain relief.  Writer discussed alternating Percocet with Ibuprofen, up to 600 mg (3 pills), both every 6-8 hours as needed for pain relief.  She expressed understanding.       Patient states she has a follow up appointment with her Podiatrist on

## 2024-10-25 ENCOUNTER — CARE COORDINATION (OUTPATIENT)
Dept: CARE COORDINATION | Age: 44
End: 2024-10-25

## 2024-10-25 NOTE — CARE COORDINATION
Ambulatory Care Coordination Note     10/25/2024 12:07 PM     Patient [place]  [place] [facility] [place] [address]     ACM contacted the patient by telephone. Verified name and  with patient as identifiers.       ACM: Sarwat Calles RN     Challenges to be reviewed by the provider   Additional needs identified to be addressed with provider No  none               Method of communication with provider: staff message.    Has the patient been seen in the ED since your last call? no    Care Summary Note:     CC Plan:       Complete ACM enrollment questions.    2.  Review medications with Patient.    3.  Review upcoming appointments with Patient.    4.  Planned to refer Patient to Social work team on last telephone encounter.  Discuss with Patient.    5.  PCP encouraged Patient to schedule appointment with Psychiatry to help better control insomnia and bipolar disorders at visit on 2024.    6.  PCP gave Patient new referral for GI at visit on 2024 for GERD.  Patient has an appointment on 2024    7.  Patient has pending lab order from PCP for HbA1C    Offered patient enrollment in the Remote Patient Monitoring (RPM) program for in-home monitoring: Deferred at this time because not discussed today; will discuss at next outreach.    Writer phoned Patient for ACM update and to discuss cc plan of care.  Patient informed Writer that she is about to leave home to do errands.  She request return call from Writer next week.     Assessments Completed:   General Assessment    Do you have any symptoms that are causing concern?: No          Medications Reviewed:   Not completed during this call:      Advance Care Planning:   Not reviewed during this call     Care Planning:   Not completed during this call    PCP/Specialist follow up:   Future Appointments         Provider Specialty Dept Phone    2024 1:45 PM Luly Greenwood MD Gastroenterology [phone]    2024 11:10 AM Natalia Regalado

## 2024-11-05 ENCOUNTER — CARE COORDINATION (OUTPATIENT)
Dept: CARE COORDINATION | Age: 44
End: 2024-11-05

## 2024-11-05 NOTE — CARE COORDINATION
Ambulatory Care Coordination Note     11/5/2024 4:07 PM     Patient outreach attempt by this AC today to perform care management follow up . AC was unable to reach the patient by telephone today; left voice message requesting a return phone call to this AC.     ACM: Sarwat Calles RN     Care Summary Note:     CC Plan:        Complete [facility] enrollment questions.     2.  Review medications with Patient.     3.  Review upcoming appointments with Patient.     4.  Planned to refer Patient to Social work team on last telephone encounter.  Discuss with Patient.     5.  PCP encouraged Patient to schedule appointment with Psychiatry to help better control insomnia and bipolar disorders at visit on 9/23/2024.     6.  PCP gave Patient new referral for GI at visit on 9/23/2024 for GERD.  Patient has an appointment on 11/27/2024     7.  Patient has pending lab order from PCP for HbA1C     Writer phoned Patient for AC update and to discuss cc plan of care.  Message left on voice mail requesting return call.  Writer's contact information provided.    PCP/Specialist follow up:   Future Appointments         Provider Specialty Dept Phone    11/27/2024 1:45 PM Luly Greenwood MD Gastroenterology [phone]    12/23/2024 11:10 AM Natalia Regalado MD Internal Medicine [phone]            Follow Up:   Plan for next [facility] outreach in approximately 1 week to complete:  - disease specific assessments  - SDOH assessments  - medication review  - advance care planning  - goal progression  - education   - follow-up appointment with Dr. Luly Greenwood on 11/27/2024 .

## 2024-11-25 ENCOUNTER — CARE COORDINATION (OUTPATIENT)
Dept: CARE COORDINATION | Age: 44
End: 2024-11-25

## 2024-11-25 NOTE — CARE COORDINATION
Ambulatory Care Coordination Note     11/25/2024 3:18 PM     Patient outreach attempt by this ACM today to perform care management follow up . ACM was unable to reach the patient by telephone today;   left voice message requesting a return phone call to this ACM.     ACM: Sarwat Calles RN     Care Summary Note:     Writer phoned Patient for ACM update.  Message left on Patient's voicemail requesting a return call.  Writer's contact information provided. Appointment reminder for Dr. Greenwood was left on Patient's voice mail.          PCP/Specialist follow up:   Future Appointments         Provider Specialty Dept Phone    11/27/2024 1:45 PM Luly Greenwood MD Gastroenterology [phone]    12/23/2024 11:10 AM Natalia Regalado MD Internal Medicine [phone]            Follow Up:   Plan for next AC outreach in approximately 1 week to complete:  - disease specific assessments  - SDOH assessments  - medication review  - advance care planning  - goal progression  - education   - follow-up appointment with Dr. Greenwood on 11/27/2024 .

## 2024-11-27 ENCOUNTER — TELEPHONE (OUTPATIENT)
Dept: GASTROENTEROLOGY | Age: 44
End: 2024-11-27

## 2024-12-10 ENCOUNTER — OFFICE VISIT (OUTPATIENT)
Dept: GASTROENTEROLOGY | Age: 44
End: 2024-12-10
Payer: MEDICAID

## 2024-12-10 ENCOUNTER — PREP FOR PROCEDURE (OUTPATIENT)
Dept: GASTROENTEROLOGY | Age: 44
End: 2024-12-10

## 2024-12-10 VITALS
RESPIRATION RATE: 16 BRPM | HEART RATE: 87 BPM | WEIGHT: 192 LBS | BODY MASS INDEX: 36.88 KG/M2 | TEMPERATURE: 97.4 F | OXYGEN SATURATION: 95 % | SYSTOLIC BLOOD PRESSURE: 129 MMHG | DIASTOLIC BLOOD PRESSURE: 89 MMHG

## 2024-12-10 DIAGNOSIS — D64.9 ANEMIA, UNSPECIFIED TYPE: ICD-10-CM

## 2024-12-10 DIAGNOSIS — K22.70 BARRETT'S ESOPHAGUS WITHOUT DYSPLASIA: ICD-10-CM

## 2024-12-10 DIAGNOSIS — K21.9 GASTROESOPHAGEAL REFLUX DISEASE, UNSPECIFIED WHETHER ESOPHAGITIS PRESENT: Primary | ICD-10-CM

## 2024-12-10 DIAGNOSIS — R16.0 HEPATOMEGALY: ICD-10-CM

## 2024-12-10 PROCEDURE — G8484 FLU IMMUNIZE NO ADMIN: HCPCS | Performed by: INTERNAL MEDICINE

## 2024-12-10 PROCEDURE — G8427 DOCREV CUR MEDS BY ELIG CLIN: HCPCS | Performed by: INTERNAL MEDICINE

## 2024-12-10 PROCEDURE — 4004F PT TOBACCO SCREEN RCVD TLK: CPT | Performed by: INTERNAL MEDICINE

## 2024-12-10 PROCEDURE — 99204 OFFICE O/P NEW MOD 45 MIN: CPT | Performed by: INTERNAL MEDICINE

## 2024-12-10 PROCEDURE — G8417 CALC BMI ABV UP PARAM F/U: HCPCS | Performed by: INTERNAL MEDICINE

## 2024-12-10 RX ORDER — POLYETHYLENE GLYCOL 3350 17 G/17G
POWDER, FOR SOLUTION ORAL
Qty: 238 G | Refills: 0 | Status: SHIPPED | OUTPATIENT
Start: 2024-12-10

## 2024-12-10 RX ORDER — BISACODYL 5 MG/1
TABLET, DELAYED RELEASE ORAL
Qty: 4 TABLET | Refills: 0 | Status: SHIPPED | OUTPATIENT
Start: 2024-12-10

## 2024-12-10 RX ORDER — POLYETHYLENE GLYCOL 3350, SODIUM CHLORIDE, SODIUM BICARBONATE, POTASSIUM CHLORIDE 420; 11.2; 5.72; 1.48 G/4L; G/4L; G/4L; G/4L
POWDER, FOR SOLUTION ORAL
Qty: 4000 ML | Refills: 0 | Status: SHIPPED | OUTPATIENT
Start: 2024-12-10

## 2024-12-10 ASSESSMENT — ENCOUNTER SYMPTOMS
RECTAL PAIN: 0
ABDOMINAL PAIN: 1
VOMITING: 1
DIARRHEA: 0
SORE THROAT: 0
ABDOMINAL DISTENTION: 1
CHOKING: 0
COUGH: 0
ANAL BLEEDING: 0
WHEEZING: 0
VOICE CHANGE: 0
NAUSEA: 1
TROUBLE SWALLOWING: 0
CONSTIPATION: 0
BLOOD IN STOOL: 0

## 2024-12-10 NOTE — PROGRESS NOTES
not diaphoretic   Neurological:      Mental Status: She is alert and oriented to person, place, and time.      Deep Tendon Reflexes: Reflexes are normal and symmetric.   Psychiatric:         Behavior: Behavior normal.         Thought Content: Thought content normal.         Judgment: Judgment normal.           IMPRESSION: Ms. Allen is a 44 y.o. female with      Diagnosis Orders   1. Gastroesophageal reflux disease, unspecified whether esophagitis present  EGD      2. Anemia, unspecified type  EGD    COLONOSCOPY W/ OR W/O BIOPSY      3. Hepatomegaly  Hepatitis C Antibody    Hepatitis B Surface Antigen    Hepatic Function Panel      4. Dunn's esophagus without dysplasia  EGD        Will proceed with above    Continue Dexilant       Medication where reviewed, side effects from the GI medication were reviewed with the patient  We did refill the GI medications            Diet/life style/natural hx /complication of the dx were all explained in details   Past medical, past surgical, social history, psychiatric history, medications or allergies, all reviewed and  updated    Thank you for allowing me to participate in the care of Ms. Allen. For any further questions please do not hesitate to contact me.    I have reviewed and agree with the ROS entered by the MA/RN.     Note is dictated utilizing voice recognition software. Unfortunately this leads to occasional typographical errors. Please contact our office if you have any questions.  This note is created with the assistance of the speech recognition program. While intending to generate a document that actually reflects the content of the visit, it can still have some errors including those of syntax and sound-a-like substitutions which may escape proof reading. Actual meaning can be extrapolated by contextual inference.    Luly Greenwood MD  [facility] Gastroenterology  O: #[phone]

## 2024-12-10 NOTE — TELEPHONE ENCOUNTER
Procedure scheduled/Dr Greenwood  Procedure:Colonoscopy / EGD  Dx: Gastroesophageal reflux disease, unspecified whether esophagitis present; Anemia, unspecified type; Dunn's esophagus without dysplasia    Date:04/11/2025  Time:9:30 am / Arrive: 8:00 am   Hospital:[facility] phone call:nicholas  Bowel Prep instructions given:Golytely extended  In office/via phone:office   Clearance needed:none

## 2024-12-26 DIAGNOSIS — F99 INSOMNIA DUE TO OTHER MENTAL DISORDER: ICD-10-CM

## 2024-12-26 DIAGNOSIS — F51.05 INSOMNIA DUE TO OTHER MENTAL DISORDER: ICD-10-CM

## 2024-12-26 DIAGNOSIS — F31.9 BIPOLAR DISORDER WITH PSYCHOTIC FEATURES (HCC): ICD-10-CM

## 2024-12-27 NOTE — TELEPHONE ENCOUNTER
Courtney Allen is calling to request a refill on the following medication(s):    Medication Request:  Requested Prescriptions     Pending Prescriptions Disp Refills    melatonin 3 MG TABS tablet [Pharmacy Med Name: MELATONIN 3 MG TABS 3 Tablet] 30 tablet 3     Sig: TAKE 1 TABLET BY MOUTH DAILY       Last Visit Date (If Applicable):  9/23/2024    Next Visit Date:    Visit date not found

## 2025-01-03 ENCOUNTER — CARE COORDINATION (OUTPATIENT)
Dept: CARE COORDINATION | Age: 45
End: 2025-01-03

## 2025-01-03 NOTE — CARE COORDINATION
Ambulatory Care Coordination Note     1/3/2025 1:06 PM     Patient outreach attempt by this AC today to perform care management follow up . [facility] was unable to reach the patient by telephone today;   left voice message requesting a return phone call to this AC.     ACM: Sarwat Calles RN     Care Summary Note:     CC Plan:        Complete [facility] enrollment questions.     2.  Review medications with Patient.     3.  Planned to refer Patient to Social work team on last telephone encounter.  Discuss with Patient.     5.  PCP encouraged Patient to schedule appointment with Psychiatry to help better control insomnia and bipolar disorders at visit on 9/23/2024.     6.  Patient kept appointment with Dr. Greenwood, GI, on 12/10/2024.  He recommended an EGD, Colonoscopy, and lab testing.  The EGD and Colonoscopy is scheduled on 4/11/2025.  Lab orders are pending.     7.  Patient has pending lab order from PCP for HbA1C dated 9/23/2025    Writer phoned Patient for AC update and to discuss cc plan of care.  Message left on Patient's voice mail requesting return call.  Writer's contact information provided.       Follow Up:   Plan for next AC outreach in approximately 1 week to complete:  - disease specific assessments  - SDOH assessments  - medication review  - advance care planning  - goal progression  - education   - outreach attempt to offer care management services.

## 2025-01-22 ENCOUNTER — CARE COORDINATION (OUTPATIENT)
Dept: CARE COORDINATION | Age: 45
End: 2025-01-22

## 2025-01-22 NOTE — CARE COORDINATION
Ambulatory Care Coordination Note     1/22/2025 2:41 PM     Patient outreach attempt by this AC today to offer care management services. [facility] was unable to reach the patient by telephone today;   left voice message requesting a return phone call to this AC.     ACM: Sarwat Calles RN     Care Summary Note:   CC Plan:        Complete [facility] enrollment questions.     2.  Review medications with Patient.     3.  Planned to refer Patient to Social work team on last telephone encounter.  Discuss with Patient.     5.  PCP encouraged Patient to schedule appointment with Psychiatry to help better control insomnia and bipolar disorders at visit on 9/23/2024.     6.  Patient kept appointment with Dr. Greenwood, GI, on 12/10/2024.  He recommended an EGD, Colonoscopy, and lab testing.  The EGD and Colonoscopy is scheduled on 4/11/2025.  Lab orders are pending.     7.  Patient has pending lab order from PCP for HbA1C dated 9/23/2025      PCP/Specialist follow up:     Writer phoned Patient for AC update and to discuss cc plan of care with Patient.  Writer last spoke with Patient on 10/25/2024.  Message left on Patient's voice mail requesting a return call.  Writer will sign off on Patient for disengagement today.      Follow Up:   No further follow up calls planned

## 2025-02-11 NOTE — TELEPHONE ENCOUNTER
..Request for   Requested Prescriptions     Pending Prescriptions Disp Refills    DEXILANT 60 MG CPDR delayed release capsule [Pharmacy Med Name: DEXILANT 60MG DR CAP 60 Capsule] 30 capsule 4     Sig: TAKE 1 CAPSULE BY MOUTH DAILY    .      Please review and e-scribe to pharmacy listed in chart if appropriate. Thank you.      Last Visit Date: 9/23/2024  Next Visit Date: Visit date not found    Future Appointments   Date Time Provider Department Center   2/24/2025  2:30 PM Jillian Fuentes APRN - CNP [id number]       Health Maintenance   Topic Date Due    HIV screen  Never done    Hepatitis B vaccine (1 of 3 - 19+ 3-dose series) Never done    DTaP/Tdap/Td vaccine (1 - Tdap) Never done    Pneumococcal 0-49 years Vaccine (1 of 2 - PCV) Never done    Breast cancer screen  Never done    A1C test (Diabetic or Prediabetic)  01/08/2024    Flu vaccine (1) Never done    COVID-19 Vaccine (1 - 2024-25 season) Never done    Depression Monitoring  09/23/2025    Lipids  09/01/2026    Colorectal Cancer Screen  08/17/2032    Hepatitis C screen  Completed    Hepatitis A vaccine  Aged Out    Hib vaccine  Aged Out    HPV vaccine  Aged Out    Polio vaccine  Aged Out    Meningococcal (ACWY) vaccine  Aged Out    Diabetic foot exam  Discontinued    Diabetic Alb to Cr ratio (uACR) test  Discontinued    Diabetic retinal exam  Discontinued    GFR test (Diabetes, CKD 3-4, OR last GFR 15-59)  Discontinued    Cervical cancer screen  Discontinued       Hemoglobin A1C (%)   Date Value   01/08/2023 6.2 (H)   06/27/2022 6.4   09/01/2021 6.2 (H)             ( goal A1C is < 7)   No components found for: \"LABMICR\"  No components found for: \"LDLCHOLESTEROL\", \"LDLCALC\"    (goal LDL is <100)   AST (U/L)   Date Value   01/13/2023 33 (H)     ALT (U/L)   Date Value   01/13/2023 55 (H)     BUN (mg/dL)   Date Value   01/15/2023 9     BP Readings from Last 3 Encounters:   12/10/24 129/89   09/23/24 122/88   12/12/23 126/81          (goal 120/80)    All

## 2025-02-13 RX ORDER — DEXLANSOPRAZOLE 60 MG/1
60 CAPSULE, DELAYED RELEASE ORAL DAILY
Qty: 30 CAPSULE | Refills: 4 | Status: SHIPPED | OUTPATIENT
Start: 2025-02-13

## 2025-02-14 ENCOUNTER — TELEPHONE (OUTPATIENT)
Dept: INTERNAL MEDICINE | Age: 45
End: 2025-02-14

## 2025-02-14 NOTE — TELEPHONE ENCOUNTER
Received request for PA in/on Epic for Dexilant.    Per chart review, PA was obtained in October and was supposed to be good for 6 months.    PA completed and sent to insurance via [employer].    Received message: Electronic prior authorization not supported as a duplicate PA was found. If requesting a dose increase or requesting above quantity limits, please submit via other methods.     PC to pharmacy, secure VM left in case they are having issues with running med through insurance.

## 2025-02-24 ENCOUNTER — OFFICE VISIT (OUTPATIENT)
Dept: OBGYN CLINIC | Age: 45
End: 2025-02-24
Payer: MEDICAID

## 2025-02-24 ENCOUNTER — HOSPITAL ENCOUNTER (OUTPATIENT)
Age: 45
Setting detail: SPECIMEN
Discharge: HOME | End: 2025-02-24

## 2025-02-24 VITALS
SYSTOLIC BLOOD PRESSURE: 116 MMHG | BODY MASS INDEX: 36.82 KG/M2 | RESPIRATION RATE: 18 BRPM | WEIGHT: 195 LBS | DIASTOLIC BLOOD PRESSURE: 78 MMHG | HEIGHT: 61 IN | HEART RATE: 92 BPM

## 2025-02-24 DIAGNOSIS — R10.2 PELVIC PAIN: ICD-10-CM

## 2025-02-24 DIAGNOSIS — R23.2 HOT FLASHES: ICD-10-CM

## 2025-02-24 DIAGNOSIS — Z01.419 WELL WOMAN EXAM: Primary | ICD-10-CM

## 2025-02-24 LAB
BACTERIA URNS QL MICRO: NORMAL
BILIRUB UR QL STRIP: NEGATIVE
CANDIDA SPECIES: NEGATIVE
CASTS #/AREA URNS LPF: NORMAL /LPF (ref 0–8)
CLARITY UR: CLEAR
COLOR UR: YELLOW
EPI CELLS #/AREA URNS HPF: NORMAL /HPF (ref 0–5)
GARDNERELLA VAGINALIS: POSITIVE
GLUCOSE UR STRIP-MCNC: ABNORMAL MG/DL
HGB UR QL STRIP.AUTO: NEGATIVE
KETONES UR STRIP-MCNC: ABNORMAL MG/DL
LEUKOCYTE ESTERASE UR QL STRIP: NEGATIVE
NITRITE UR QL STRIP: NEGATIVE
PH UR STRIP: 5.5 [PH] (ref 5–8)
PROT UR STRIP-MCNC: ABNORMAL MG/DL
RBC #/AREA URNS HPF: NORMAL /HPF (ref 0–4)
SOURCE: ABNORMAL
SP GR UR STRIP: 1.05 (ref 1–1.03)
TRICHOMONAS: POSITIVE
UROBILINOGEN UR STRIP-ACNC: NORMAL EU/DL (ref 0–1)
WBC #/AREA URNS HPF: NORMAL /HPF (ref 0–5)

## 2025-02-24 PROCEDURE — G8427 DOCREV CUR MEDS BY ELIG CLIN: HCPCS | Performed by: NURSE PRACTITIONER

## 2025-02-24 PROCEDURE — 99386 PREV VISIT NEW AGE 40-64: CPT | Performed by: NURSE PRACTITIONER

## 2025-02-24 PROCEDURE — 4004F PT TOBACCO SCREEN RCVD TLK: CPT | Performed by: NURSE PRACTITIONER

## 2025-02-24 PROCEDURE — G8417 CALC BMI ABV UP PARAM F/U: HCPCS | Performed by: NURSE PRACTITIONER

## 2025-02-24 PROCEDURE — 99203 OFFICE O/P NEW LOW 30 MIN: CPT | Performed by: NURSE PRACTITIONER

## 2025-02-24 NOTE — PROGRESS NOTES
minimum of an eleven point review of systems was completed.    Review Of Systems (11 point):  Constitutional: No fever, chills or malaise; No weight change or fatigue  Head and Eyes: No vision changes, Headache, Dizziness or trauma in last 12 months  ENT ROS: No hearing, Tinnitis, sinus or taste problems  Hematological and Lymphatic ROS:No Lymphoma, Von Willebrand's, Hemophillia or Bleeding History  Psych ROS: No Depression, Homicidal thoughts,suicidal thoughts, or anxiety  Breast ROS: No breast abnormalities or lumps  Respiratory ROS: No SOB, Pneumoniae,Cough, or Pulmonary Embolism   Cardiovascular ROS: No Chest Pain with Exertion, Palpitations, Syncope, Edema, Arrhythmia  Gastrointestinal ROS: No Indigestion, Heartburn, Nausea, vomiting, Diarrhea, Constipation,or Bowel Changes; No Bloody Stools or melena  Genito-Urinary ROS: No Dysuria, Hematuria or Nocturia. No Urinary Incontinence or Vaginal Discharge:+pelvic pain, hot flashes  Musculoskeletal ROS: No Arthralgia, Arthritis,Gout,Osteoporosis or Rheumatism  Neurological ROS: No CVA, Migraines, Epilepsy, Seizure Hx, or Limb Weakness  Dermatological ROS: No Rash, Itching, Hives, Mole Changes or Cancer                                                                                                                                                                                                                                  PHYSICAL Exam:     Constitutional:  Vitals:    02/24/25 1448   BP: 116/78   Site: Left Upper Arm   Position: Sitting   Cuff Size: Medium Adult   Pulse: 92   Resp: 18   Weight: 88.5 kg (195 lb)   Height: 1.549 m (5' 1\")       Chaperone for Intimate Exam  Chaperone was offered and accepted as part of the rooming process.  Chaperone: Rahel          General Appearance:  This  is a well Developed, well Nourished, well groomed female.      Her BMI was reviewed. Nutritional decision making was discussed.    Skin:  There was a Normal Inspection of the skin

## 2025-02-25 ENCOUNTER — TELEPHONE (OUTPATIENT)
Dept: OBGYN CLINIC | Age: 45
End: 2025-02-25

## 2025-02-25 LAB
C TRACH DNA SPEC QL PROBE+SIG AMP: NEGATIVE
N GONORRHOEA DNA SPEC QL PROBE+SIG AMP: NEGATIVE
SPECIMEN DESCRIPTION: NORMAL

## 2025-02-25 NOTE — TELEPHONE ENCOUNTER
----- Message from PHILIP Lai CNP sent at 2/25/2025  8:03 AM EST -----  Trichomonas and BV  Flagyl 500mg PO BID X 7 days  Test of cure 2-3 weeks  Abstain  Partner needs treated  GC/ chlamydia pending    3+ glucose in urine.  Please refer patient to her PCP for diabetes management.

## 2025-02-26 ENCOUNTER — TELEPHONE (OUTPATIENT)
Dept: OBGYN CLINIC | Age: 45
End: 2025-02-26

## 2025-02-26 RX ORDER — METRONIDAZOLE 500 MG/1
500 TABLET ORAL 2 TIMES DAILY
Qty: 14 TABLET | Refills: 0 | Status: SHIPPED | OUTPATIENT
Start: 2025-02-26 | End: 2025-03-05

## 2025-02-26 NOTE — TELEPHONE ENCOUNTER
[facility] Result Note Patient Contact Communication   [address] [place] [address]      02/26/25   3:16 PM     Patients Name: Courtney Allen   Medical Record Number: [id number]   Contact Serial Number: [id number]  YOB: 1980       Patients Phone Number: [phone]     Description of Recommendations:  The patient was contacted and her identity was confirmed with two of the specific identifiers listed above.  The information regarding her recent testing results and provider recommendations were reviewed with her in detail and all questions were answered.   Recent labs/Cultures/ Imaging Studies/Pathology reports and Urinalysis results are included:      Recommendations given by provider:  Jillian Fuentes, PHILIP - CNP  P p [facility] Clinical Support Pool  Trichomonas and BV  Flagyl 500mg PO BID X 7 days  Test of cure 2-3 weeks  Abstain  Partner needs treated  GC/ chlamydia pending    3+ glucose in urine.  Please refer patient to her PCP for diabetes management.    The patient verbalized understanding of the information above and the recommendation by the provider. She will contact her PCP if any other questions arise or contact our office for any other clarifications.        Electronically signed by Rahel Maldonado on 2/26/2025 at 3:16 PM

## 2025-03-04 ENCOUNTER — HOSPITAL ENCOUNTER (EMERGENCY)
Age: 45
Discharge: HOME | End: 2025-03-04
Attending: EMERGENCY MEDICINE
Payer: MEDICAID

## 2025-03-04 ENCOUNTER — APPOINTMENT (OUTPATIENT)
Dept: GENERAL RADIOLOGY | Age: 45
End: 2025-03-04
Payer: MEDICAID

## 2025-03-04 VITALS
RESPIRATION RATE: 20 BRPM | TEMPERATURE: 97.4 F | HEART RATE: 102 BPM | WEIGHT: 195 LBS | BODY MASS INDEX: 36.82 KG/M2 | OXYGEN SATURATION: 97 % | HEIGHT: 61 IN | DIASTOLIC BLOOD PRESSURE: 79 MMHG | SYSTOLIC BLOOD PRESSURE: 123 MMHG

## 2025-03-04 DIAGNOSIS — J44.1 COPD EXACERBATION (HCC): Primary | ICD-10-CM

## 2025-03-04 LAB
FLUAV RNA RESP QL NAA+PROBE: NOT DETECTED
FLUBV RNA RESP QL NAA+PROBE: NOT DETECTED
SARS-COV-2 RNA RESP QL NAA+PROBE: NOT DETECTED
SOURCE: NORMAL
SPECIMEN DESCRIPTION: NORMAL

## 2025-03-04 PROCEDURE — 87636 SARSCOV2 & INF A&B AMP PRB: CPT

## 2025-03-04 PROCEDURE — 71046 X-RAY EXAM CHEST 2 VIEWS: CPT

## 2025-03-04 PROCEDURE — 99284 EMERGENCY DEPT VISIT MOD MDM: CPT

## 2025-03-04 RX ORDER — AZITHROMYCIN 250 MG/1
TABLET, FILM COATED ORAL
Qty: 6 TABLET | Refills: 0 | Status: SHIPPED | OUTPATIENT
Start: 2025-03-04 | End: 2025-03-14

## 2025-03-04 RX ORDER — PREDNISONE 20 MG/1
TABLET ORAL
Qty: 18 TABLET | Refills: 0 | Status: SHIPPED | OUTPATIENT
Start: 2025-03-04 | End: 2025-03-14

## 2025-03-04 ASSESSMENT — PAIN SCALES - GENERAL: PAINLEVEL_OUTOF10: 10

## 2025-03-04 ASSESSMENT — PAIN - FUNCTIONAL ASSESSMENT: PAIN_FUNCTIONAL_ASSESSMENT: 0-10

## 2025-03-04 NOTE — DISCHARGE INSTRUCTIONS
Please take antibiotic and steroid as directed until complete.  Call your pulmonologist tomorrow for close follow-up appointment this week.

## 2025-04-01 DIAGNOSIS — F51.05 INSOMNIA DUE TO OTHER MENTAL DISORDER: ICD-10-CM

## 2025-04-01 DIAGNOSIS — F31.9 BIPOLAR DISORDER WITH PSYCHOTIC FEATURES (HCC): ICD-10-CM

## 2025-04-01 DIAGNOSIS — F99 INSOMNIA DUE TO OTHER MENTAL DISORDER: ICD-10-CM

## 2025-04-02 NOTE — TELEPHONE ENCOUNTER
Courtney Allen is calling to request a refill on the following medication(s):    Medication Request:  Melatonin      Last Visit Date (If Applicable):  9/23/2024    Next Visit Date:    Visit date not found

## 2025-04-22 ENCOUNTER — TELEPHONE (OUTPATIENT)
Dept: GASTROENTEROLOGY | Age: 45
End: 2025-04-22

## 2025-04-22 ENCOUNTER — PREP FOR PROCEDURE (OUTPATIENT)
Dept: GASTROENTEROLOGY | Age: 45
End: 2025-04-22

## 2025-04-22 DIAGNOSIS — K58.9 IRRITABLE BOWEL SYNDROME WITHOUT DIARRHEA: Primary | ICD-10-CM

## 2025-04-22 DIAGNOSIS — K22.70 BARRETT ESOPHAGUS WITHOUT DYSPLASIA: ICD-10-CM

## 2025-04-22 DIAGNOSIS — R14.0 BLOATING: ICD-10-CM

## 2025-04-22 NOTE — TELEPHONE ENCOUNTER
Patient is requesting a call regarding medication and rescheduling her procedure patient can be reached at [phone]. Okay to leave a message.

## 2025-04-23 RX ORDER — METOCLOPRAMIDE 5 MG/1
5 TABLET ORAL 3 TIMES DAILY
Qty: 120 TABLET | Refills: 2 | Status: SHIPPED | OUTPATIENT
Start: 2025-04-23

## 2025-04-23 NOTE — TELEPHONE ENCOUNTER
Patient is requesting refill of Reglan. States that she was previously on this medication for bloating/abd pain following meals. Chart review confirming. Discussed that she will be sent the lowest effective dose of this medication. Reports she was only taking it 2 times per day on most days. Will send over 3x per day dosing as needed, but pt understanding not to take unless she is eating a meal to reduce overall dose.     Patient was counseled on the potential adverse reactions associated with Reglan (Metoclopramide).  Reglan carries a Black Box warning linking Reglan with a serious condition called tardive dyskinesia (TD).  TD is a disorder affecting certain muscles in the body, resulting in involuntary, repetitive body movements that may include grimacing, sticking out the tongue, smacking of the lips, and impaired movement of the hands.  No treatment exists and symptoms are rarely reversible.  Risk is increased with treatment duration, total cumulative dose. After counseling patient understands risk and wishes to proceed with treatment. Patient is advised to stop medication at first sign of TD. Treatment should not exceed 3 months.

## 2025-06-10 NOTE — TELEPHONE ENCOUNTER
Courtney Allen is calling to request a refill on the following medication(s):    Medication Request:  Requested Prescriptions     Pending Prescriptions Disp Refills    DEXILANT 60 MG CPDR delayed release capsule [Pharmacy Med Name: DEXILANT 60MG DR CAP 60 Capsule] 30 capsule 4     Sig: TAKE 1 CAPSULE BY MOUTH DAILY       Last Visit Date (If Applicable):  Visit date not found    Next Visit Date:    Visit date not found

## 2025-06-11 RX ORDER — DEXLANSOPRAZOLE 60 MG/1
60 CAPSULE, DELAYED RELEASE ORAL DAILY
Qty: 30 CAPSULE | Refills: 4 | Status: SHIPPED | OUTPATIENT
Start: 2025-06-11

## 2025-07-15 ENCOUNTER — HOSPITAL ENCOUNTER (OUTPATIENT)
Age: 45
Discharge: HOME | End: 2025-07-15
Payer: MEDICAID

## 2025-07-15 LAB
25(OH)D3 SERPL-MCNC: 21.3 NG/ML (ref 30–100)
ALBUMIN SERPL-MCNC: 4.2 G/DL (ref 3.5–5.2)
ALBUMIN/GLOB SERPL: 1.4 {RATIO} (ref 1–2.5)
ALP SERPL-CCNC: 121 U/L (ref 35–104)
ALT SERPL-CCNC: 19 U/L (ref 10–35)
ANION GAP SERPL CALCULATED.3IONS-SCNC: 14 MMOL/L (ref 9–16)
AST SERPL-CCNC: 20 U/L (ref 10–35)
BILIRUB SERPL-MCNC: 0.3 MG/DL (ref 0–1.2)
BUN SERPL-MCNC: 11 MG/DL (ref 6–20)
CALCIUM SERPL-MCNC: 9.7 MG/DL (ref 8.6–10.4)
CHLORIDE SERPL-SCNC: 100 MMOL/L (ref 98–107)
CHOLEST SERPL-MCNC: 212 MG/DL (ref 0–199)
CHOLESTEROL/HDL RATIO: 4.9
CO2 SERPL-SCNC: 22 MMOL/L (ref 20–31)
CREAT SERPL-MCNC: 0.9 MG/DL (ref 0.6–0.9)
CREAT UR-MCNC: 156 MG/DL (ref 28–217)
EST. AVERAGE GLUCOSE BLD GHB EST-MCNC: 352 MG/DL
ESTRADIOL LEVEL: <5 PG/ML
FSH SERPL-ACNC: 71.1 MIU/ML
GFR, ESTIMATED: 80 ML/MIN/1.73M2
GLUCOSE SERPL-MCNC: 319 MG/DL (ref 74–99)
HBA1C MFR BLD: 13.9 % (ref 4–6)
HDLC SERPL-MCNC: 43 MG/DL
LDLC SERPL CALC-MCNC: 124 MG/DL (ref 0–100)
LH SERPL-ACNC: 38.4 MIU/ML (ref 1.7–8.6)
MICROALBUMIN UR-MCNC: 22 MG/L (ref 0–20)
MICROALBUMIN/CREAT UR-RTO: 14 MCG/MG CREAT (ref 0–25)
POTASSIUM SERPL-SCNC: 4.2 MMOL/L (ref 3.7–5.3)
PROLACTIN SERPL-MCNC: 4.89 NG/ML (ref 4.79–23.3)
PROT SERPL-MCNC: 7.2 G/DL (ref 6.6–8.7)
PTH-INTACT SERPL-MCNC: 40 PG/ML (ref 17.9–58.6)
SODIUM SERPL-SCNC: 136 MMOL/L (ref 136–145)
T3FREE SERPL-MCNC: 3.61 PG/ML (ref 2–4.4)
T4 FREE SERPL-MCNC: 1.1 NG/DL (ref 0.92–1.68)
TRIGL SERPL-MCNC: 227 MG/DL
TSH SERPL DL<=0.05 MIU/L-ACNC: 2.43 UIU/ML (ref 0.27–4.2)
VLDLC SERPL CALC-MCNC: 45 MG/DL (ref 1–30)

## 2025-07-15 PROCEDURE — 83036 HEMOGLOBIN GLYCOSYLATED A1C: CPT

## 2025-07-15 PROCEDURE — 86376 MICROSOMAL ANTIBODY EACH: CPT

## 2025-07-15 PROCEDURE — 82670 ASSAY OF TOTAL ESTRADIOL: CPT

## 2025-07-15 PROCEDURE — 84439 ASSAY OF FREE THYROXINE: CPT

## 2025-07-15 PROCEDURE — 83001 ASSAY OF GONADOTROPIN (FSH): CPT

## 2025-07-15 PROCEDURE — 36415 COLL VENOUS BLD VENIPUNCTURE: CPT

## 2025-07-15 PROCEDURE — 83970 ASSAY OF PARATHORMONE: CPT

## 2025-07-15 PROCEDURE — 83002 ASSAY OF GONADOTROPIN (LH): CPT

## 2025-07-15 PROCEDURE — 82306 VITAMIN D 25 HYDROXY: CPT

## 2025-07-15 PROCEDURE — 84481 FREE ASSAY (FT-3): CPT

## 2025-07-15 PROCEDURE — 84146 ASSAY OF PROLACTIN: CPT

## 2025-07-15 PROCEDURE — 80053 COMPREHEN METABOLIC PANEL: CPT

## 2025-07-15 PROCEDURE — 84443 ASSAY THYROID STIM HORMONE: CPT

## 2025-07-15 PROCEDURE — 80061 LIPID PANEL: CPT

## 2025-07-15 PROCEDURE — 82043 UR ALBUMIN QUANTITATIVE: CPT

## 2025-07-15 PROCEDURE — 82570 ASSAY OF URINE CREATININE: CPT

## 2025-07-16 LAB — THYROPEROXIDASE AB SERPL IA-ACNC: <4 IU/ML (ref 0–25)

## 2025-07-17 ENCOUNTER — TELEPHONE (OUTPATIENT)
Dept: GASTROENTEROLOGY | Age: 45
End: 2025-07-17

## 2025-07-17 NOTE — TELEPHONE ENCOUNTER
Patient stated she was returning a missed call regarding obtaining a sooner procedure appointment and can be and can be reached at [phone] .

## 2025-07-18 NOTE — TELEPHONE ENCOUNTER
Writer tried calling the patient back to let them know the spot was no longer available. Patient phone number was no working at this time.

## 2025-07-29 DIAGNOSIS — K58.9 IRRITABLE BOWEL SYNDROME WITHOUT DIARRHEA: ICD-10-CM

## 2025-07-29 DIAGNOSIS — R14.0 BLOATING: ICD-10-CM

## 2025-08-04 RX ORDER — METOCLOPRAMIDE 5 MG/1
5 TABLET ORAL 3 TIMES DAILY
Qty: 90 TABLET | Refills: 2 | Status: SHIPPED | OUTPATIENT
Start: 2025-08-04

## 2025-08-25 ENCOUNTER — TELEPHONE (OUTPATIENT)
Dept: GASTROENTEROLOGY | Age: 45
End: 2025-08-25

## 2025-08-25 DIAGNOSIS — K22.70 BARRETT ESOPHAGUS WITHOUT DYSPLASIA: Primary | ICD-10-CM

## 2025-08-25 DIAGNOSIS — K21.00 GASTROESOPHAGEAL REFLUX DISEASE WITH ESOPHAGITIS WITHOUT HEMORRHAGE: ICD-10-CM

## 2025-08-29 RX ORDER — DEXLANSOPRAZOLE 60 MG/1
60 CAPSULE, DELAYED RELEASE ORAL DAILY
Qty: 30 CAPSULE | Refills: 5 | Status: SHIPPED | OUTPATIENT
Start: 2025-08-29

## 2025-08-29 RX ORDER — DEXLANSOPRAZOLE 60 MG/1
60 CAPSULE, DELAYED RELEASE ORAL DAILY
Qty: 30 CAPSULE | Refills: 4 | OUTPATIENT
Start: 2025-08-29

## 2025-08-29 RX ORDER — PANTOPRAZOLE SODIUM 40 MG/1
TABLET, DELAYED RELEASE ORAL
Qty: 180 TABLET | Refills: 5 | Status: SHIPPED | OUTPATIENT
Start: 2025-08-29

## (undated) DEVICE — GLOVE ORANGE PI 7   MSG9070

## (undated) DEVICE — CORD ES L12FT BPLR FRCP

## (undated) DEVICE — APPLICATOR MEDICATED 26 CC SOLUTION HI LT ORNG CHLORAPREP

## (undated) DEVICE — BANDAGE,GAUZE,CONFORMING,3"X75",STRL,LF: Brand: MEDLINE

## (undated) DEVICE — TOURNIQUET CUF BLD PRESSURE 4X18 IN 2 PRT SINGLE BLDR RED

## (undated) DEVICE — GLOVE ORANGE PI 7 1/2   MSG9075

## (undated) DEVICE — GLOVE SURG SZ 6 THK91MIL LTX FREE SYN POLYISOPRENE ANTI

## (undated) DEVICE — GLOVE ORANGE PI 8   MSG9080

## (undated) DEVICE — SVMMC HND